# Patient Record
Sex: FEMALE | Race: WHITE | NOT HISPANIC OR LATINO | Employment: OTHER | URBAN - METROPOLITAN AREA
[De-identification: names, ages, dates, MRNs, and addresses within clinical notes are randomized per-mention and may not be internally consistent; named-entity substitution may affect disease eponyms.]

---

## 2018-05-16 ENCOUNTER — HOSPITAL ENCOUNTER (EMERGENCY)
Facility: OTHER | Age: 48
Discharge: HOME | End: 2018-05-16
Attending: EMERGENCY MEDICINE
Payer: MEDICAID

## 2018-05-16 VITALS
WEIGHT: 111 LBS | BODY MASS INDEX: 22.42 KG/M2 | DIASTOLIC BLOOD PRESSURE: 64 MMHG | RESPIRATION RATE: 17 BRPM | TEMPERATURE: 98 F | OXYGEN SATURATION: 100 % | HEART RATE: 98 BPM | SYSTOLIC BLOOD PRESSURE: 119 MMHG

## 2018-05-16 DIAGNOSIS — N30.90 CYSTITIS: Primary | ICD-10-CM

## 2018-05-16 LAB
ALBUMIN SERPL BCP-MCNC: 2.4 G/DL
ALP SERPL-CCNC: 97 U/L
ALT SERPL W/O P-5'-P-CCNC: 17 U/L
ANION GAP SERPL CALC-SCNC: 9 MMOL/L
AST SERPL-CCNC: 26 U/L
B-HCG UR QL: NEGATIVE
BACTERIA #/AREA URNS HPF: ABNORMAL /HPF
BASOPHILS # BLD AUTO: 0.05 K/UL
BASOPHILS NFR BLD: 0.3 %
BILIRUB SERPL-MCNC: 0.2 MG/DL
BILIRUB UR QL STRIP: NEGATIVE
BUN SERPL-MCNC: 22 MG/DL
CALCIUM SERPL-MCNC: 9.3 MG/DL
CHLORIDE SERPL-SCNC: 105 MMOL/L
CLARITY UR: ABNORMAL
CO2 SERPL-SCNC: 21 MMOL/L
COLOR UR: ABNORMAL
CREAT SERPL-MCNC: 1.8 MG/DL
CTP QC/QA: YES
DIFFERENTIAL METHOD: ABNORMAL
EOSINOPHIL # BLD AUTO: 0 K/UL
EOSINOPHIL NFR BLD: 0.2 %
ERYTHROCYTE [DISTWIDTH] IN BLOOD BY AUTOMATED COUNT: 16.8 %
EST. GFR  (AFRICAN AMERICAN): 38 ML/MIN/1.73 M^2
EST. GFR  (NON AFRICAN AMERICAN): 33 ML/MIN/1.73 M^2
GLUCOSE SERPL-MCNC: 147 MG/DL
GLUCOSE UR QL STRIP: NEGATIVE
HCT VFR BLD AUTO: 43.1 %
HGB BLD-MCNC: 13.9 G/DL
HGB UR QL STRIP: ABNORMAL
HYALINE CASTS #/AREA URNS LPF: 0 /LPF
KETONES UR QL STRIP: NEGATIVE
LEUKOCYTE ESTERASE UR QL STRIP: ABNORMAL
LIPASE SERPL-CCNC: 10 U/L
LYMPHOCYTES # BLD AUTO: 2.3 K/UL
LYMPHOCYTES NFR BLD: 13.9 %
MCH RBC QN AUTO: 28.6 PG
MCHC RBC AUTO-ENTMCNC: 32.3 G/DL
MCV RBC AUTO: 89 FL
MICROSCOPIC COMMENT: ABNORMAL
MONOCYTES # BLD AUTO: 0.6 K/UL
MONOCYTES NFR BLD: 3.6 %
NEUTROPHILS # BLD AUTO: 13.5 K/UL
NEUTROPHILS NFR BLD: 81.8 %
NITRITE UR QL STRIP: POSITIVE
PH UR STRIP: 6 [PH] (ref 5–8)
PLATELET # BLD AUTO: 602 K/UL
PMV BLD AUTO: 9.6 FL
POTASSIUM SERPL-SCNC: 4.8 MMOL/L
PROT SERPL-MCNC: 8.3 G/DL
PROT UR QL STRIP: ABNORMAL
RBC # BLD AUTO: 4.86 M/UL
RBC #/AREA URNS HPF: 8 /HPF (ref 0–4)
SODIUM SERPL-SCNC: 135 MMOL/L
SP GR UR STRIP: 1.02 (ref 1–1.03)
URN SPEC COLLECT METH UR: ABNORMAL
UROBILINOGEN UR STRIP-ACNC: NEGATIVE EU/DL
WBC # BLD AUTO: 16.47 K/UL
WBC #/AREA URNS HPF: >100 /HPF (ref 0–5)

## 2018-05-16 PROCEDURE — 85025 COMPLETE CBC W/AUTO DIFF WBC: CPT

## 2018-05-16 PROCEDURE — 87147 CULTURE TYPE IMMUNOLOGIC: CPT

## 2018-05-16 PROCEDURE — 63600175 PHARM REV CODE 636 W HCPCS: Performed by: EMERGENCY MEDICINE

## 2018-05-16 PROCEDURE — 96374 THER/PROPH/DIAG INJ IV PUSH: CPT

## 2018-05-16 PROCEDURE — 81025 URINE PREGNANCY TEST: CPT | Performed by: EMERGENCY MEDICINE

## 2018-05-16 PROCEDURE — 87086 URINE CULTURE/COLONY COUNT: CPT

## 2018-05-16 PROCEDURE — 99284 EMERGENCY DEPT VISIT MOD MDM: CPT | Mod: 25

## 2018-05-16 PROCEDURE — 80053 COMPREHEN METABOLIC PANEL: CPT

## 2018-05-16 PROCEDURE — 81000 URINALYSIS NONAUTO W/SCOPE: CPT

## 2018-05-16 PROCEDURE — 87088 URINE BACTERIA CULTURE: CPT

## 2018-05-16 PROCEDURE — 83690 ASSAY OF LIPASE: CPT

## 2018-05-16 RX ORDER — ATORVASTATIN CALCIUM 10 MG/1
10 TABLET, FILM COATED ORAL DAILY
COMMUNITY

## 2018-05-16 RX ORDER — CIPROFLOXACIN 500 MG/1
500 TABLET ORAL 2 TIMES DAILY
Qty: 14 TABLET | Refills: 0 | Status: SHIPPED | OUTPATIENT
Start: 2018-05-16 | End: 2018-05-26

## 2018-05-16 RX ORDER — CEFTRIAXONE 1 G/1
1 INJECTION, POWDER, FOR SOLUTION INTRAMUSCULAR; INTRAVENOUS
Status: COMPLETED | OUTPATIENT
Start: 2018-05-16 | End: 2018-05-16

## 2018-05-16 RX ORDER — METFORMIN HYDROCHLORIDE 500 MG/1
500 TABLET, FILM COATED, EXTENDED RELEASE ORAL
COMMUNITY

## 2018-05-16 RX ADMIN — CEFTRIAXONE SODIUM 1 G: 1 INJECTION, POWDER, FOR SOLUTION INTRAMUSCULAR; INTRAVENOUS at 04:05

## 2018-05-16 NOTE — DISCHARGE INSTRUCTIONS
Follow up with urology tomorrow as scheduled.    We have prescribed you antibiotics. Please fill and take as directed. It is important that you completely finish the antibiotics.        Please return to the ER if you have chest pain, difficulty breathing, fevers, altered mental status, dizziness, weakness, or any other concerns.      Follow up with your primary care physician.

## 2018-05-16 NOTE — ED TRIAGE NOTES
Pt with developmental delay and accompanied with caregiver. Pt has been having abd pain for less than 6 months and wt loss. Has seen a GI doctor who referred her to urology for a bladder obstruction. Appointment is tomorrow. Pt had two incontinent episodes and more complaints about lower abd pain so they brought her here. Staff reports she was coming down stairs and started shaking and appearing SOB. Pt points to lower abd/pelvic area as source of pain but unable to elaborate about specifics of pain.

## 2018-05-16 NOTE — ED PROVIDER NOTES
Encounter Date: 5/16/2018    SCRIBE #1 NOTE: I, Dr. Rudolph, am scribing for, and in the presence of, Frankie Buckley .       History     Chief Complaint   Patient presents with    Abdominal Pain     Per , pt urinated excessively in the bed two times this am. Pt also c/o abd pain.      Time seen by provider: 12:44 PM    This is a 47 y.o. Female, with history of osteopenia, who presents with complaint of intermittent, chronic abdominal pain for months that worsened this morning. Per caregiver, patient is a resident of a group home and urinated on herself twice this morning. Per caregiver, patient is not experiencing dysuria or vaginal pain with urinating. Caregiver states bladder incontinence is abnormal for the patient. Per caregiver, patient began experiencing weakness, tremors, and abdominal pain today. She was evaluated by GI in the past, had imaging completed, and was diagnosed with bladder obstruction. GI referred the patient to a urologist, and her appointment is tomorrow. Per caregiver, patient underwent an abdominal surgery in the past but is unsure of the specific procedure.  Per caregiver, pt currently at baseline at this time.          The history is provided by the patient and a caregiver. The history is limited by a developmental delay.     Review of patient's allergies indicates:   Allergen Reactions    Caffeine      Past Medical History:   Diagnosis Date    Osteopenia      Past Surgical History:   Procedure Laterality Date    HERNIA REPAIR       No family history on file.  Social History   Substance Use Topics    Smoking status: Never Smoker    Smokeless tobacco: Not on file    Alcohol use No     Review of Systems   Unable to perform ROS: Patient nonverbal       Physical Exam     Initial Vitals [05/16/18 1145]   BP Pulse Resp Temp SpO2   (!) 94/59 102 18 97.8 °F (36.6 °C) 99 %      MAP       70.67         Physical Exam    Nursing note and vitals reviewed.  Constitutional: She appears  well-developed and well-nourished. She is not diaphoretic. No distress.   HENT:   Head: Normocephalic and atraumatic.   Right Ear: External ear normal.   Left Ear: External ear normal.   Eyes: Conjunctivae and EOM are normal. Right eye exhibits no discharge. Left eye exhibits no discharge.   Neck: Normal range of motion. Neck supple. No tracheal deviation present.   Cardiovascular: Normal rate, regular rhythm, normal heart sounds and intact distal pulses.   Pulmonary/Chest: Breath sounds normal. No stridor. No respiratory distress. She has no wheezes. She has no rhonchi. She has no rales.   Abdominal: Soft. Bowel sounds are normal. She exhibits no distension. There is no tenderness. There is no rebound and no guarding.   Musculoskeletal: Normal range of motion. She exhibits no edema or tenderness.   Neurological: She is alert. She has normal strength.   Ambulatory with steady gait.     Skin: Skin is warm and dry. Capillary refill takes less than 2 seconds. No erythema. No pallor.   Psychiatric: She has a normal mood and affect. Her behavior is normal.         ED Course   Procedures  Labs Reviewed   URINALYSIS - Abnormal; Notable for the following:        Result Value    Appearance, UA Cloudy (*)     Protein, UA 3+ (*)     Occult Blood UA 3+ (*)     Nitrite, UA Positive (*)     Leukocytes, UA 2+ (*)     All other components within normal limits   COMPREHENSIVE METABOLIC PANEL - Abnormal; Notable for the following:     Sodium 135 (*)     CO2 21 (*)     Glucose 147 (*)     BUN, Bld 22 (*)     Creatinine 1.8 (*)     Albumin 2.4 (*)     eGFR if  38 (*)     eGFR if non  33 (*)     All other components within normal limits   CBC W/ AUTO DIFFERENTIAL - Abnormal; Notable for the following:     WBC 16.47 (*)     RDW 16.8 (*)     Platelets 602 (*)     Gran # (ANC) 13.5 (*)     Gran% 81.8 (*)     Lymph% 13.9 (*)     Mono% 3.6 (*)     All other components within normal limits   URINALYSIS  MICROSCOPIC - Abnormal; Notable for the following:     RBC, UA 8 (*)     WBC, UA >100 (*)     Bacteria, UA Many (*)     All other components within normal limits   CULTURE, URINE   CULTURE, URINE   LIPASE   POCT URINE PREGNANCY     Imaging Results          CT Abdomen Pelvis  Without Contrast (Final result)  Result time 05/16/18 14:59:48   Procedure changed from CT Abdomen Pelvis With Contrast     Final result by Brian Spann MD (05/16/18 14:59:48)                 Impression:      1. Marked urinary bladder wall thickening and urinary bladder distension.  This is in association with diffuse pericystic inflammation, and marked periureteral inflammation.  There is mild to moderate bilateral hydroureteronephrosis and urothelial thickening.  Findings are concerning for significant cystitis with likely ascending infection.  Clinical correlation and follow-up is recommended to exclude neoplastic process.  2. Postsurgical changes of the interpolar region of the left kidney, correlation with surgical history recommended.  3. Several additional findings above.      Electronically signed by: Brian Spann MD  Date:    05/16/2018  Time:    14:59             Narrative:    EXAMINATION:  CT ABDOMEN PELVIS WITHOUT CONTRAST    CLINICAL HISTORY:  Abdominal pain, unspecified;    TECHNIQUE:  Low dose axial images, sagittal and coronal reformations were obtained from the lung bases to the pubic symphysis.  Oral contrast was not administered.    COMPARISON:  None    FINDINGS:  Images of the lower thorax are remarkable for mild dependent atelectasis.    Please note, examination is limited secondary to patient motion.    Allowing for the above, the liver, pancreas, gallbladder and adrenal glands have a grossly unremarkable noncontrast appearance.  There is a lobular contour to the spleen, could reflect sequela of previous injury, or surgery versus variant.  There is no biliary dilation or ascites.  The pancreatic duct is not  dilated.  There is a minimal hiatal hernia.  Scattered shotty periaortic and paracaval lymph nodes are noted.    There are postsurgical changes with calcification involving the interpolar region of the left kidney.  There is bilateral moderate hydroureteronephrosis perinephric inflammation.  There is significant bilateral periureteral inflammation noting the ureters are prominent along their courses to the urinary bladder without definite calculi seen.  There is bilateral ureteral urothelial thickening.  There is diffuse circumferential thickening of the urinary bladder wall, measuring up to 1.4 cm.  The uterus and adnexa is otherwise grossly unremarkable noting dystrophic calcification within the left deep pelvis.  No focal organized pelvic fluid collection.    The large bowel is grossly unremarkable as is the appendix and terminal ileum.  The small bowel is grossly unremarkable.    Degenerative changes are noted of the lumbar spine without focal osseous destructive process.  There is superior endplate compression, that appears to be on the basis of a Schmorl's node, involving the superior endplate of L1.  There is vacuum disc phenomenon at this level.  No significant inguinal lymphadenopathy.  There is a fat containing umbilical hernia, as well as an anterior abdominal wall hernia just cranial to the umbilicus, containing fat.                                        Medical Decision Making:   History:   Old Medical Records: I decided to obtain old medical records.  Old Records Summarized: other records and records from clinic visits.  Initial Assessment:   12:44PM: Pt is a 46 y/o F who presents to ED with chronic abdominal pain, possibly worsening today, along with 2 episodes of urinary incontinence which is not her baseline. Pt otherwise acting at her baseline.  Will plan for labs, UA, imaging, will continue to follow and reassess.    Clinical Tests:   Lab Tests: Ordered and Reviewed  Radiological Study: Ordered  and Reviewed    3:39 PM:  Pt's US indicates UTI with +nitrite, 2+leuks. Urine culture pending.  CT shows bilateral hydronephrosis and findings consistent with cystitis as well.  Will plan for a dose of IV abx here along with PO course.  I updated pt's caregiver regarding results and plan, all questions answered.      4:17 PM:  Pt doing well, tolerated IV abx well.  Will plan to provide pt with copy of labs and CT results to bring to her urology appointment tomorrow.  I explained results to caregiver.  I have discussed with the pt's caregiver D return warnings and need for close PCP f/u.  Pt's caregiver agreeable to plan and all questions answered.  I feel that pt is stable for discharge and management as an outpatient and no further intervention is needed at this time.  Pt's caregiver is comfortable returning to the ED if needed.  Will DC home in stable condition.                  Scribe Attestation:   Scribe #1: I performed the above scribed service and the documentation accurately describes the services I performed. I attest to the accuracy of the note.    Attending Attestation:           Physician Attestation for Scribe:  Physician Attestation Statement for Scribe #1: I, Dr. Rudolph, reviewed documentation, as scribed by Frankie Buckley  in my presence, and it is both accurate and complete.                    Clinical Impression:     1. Cystitis                                 Bettie Rudolph MD  05/16/18 1021

## 2018-05-16 NOTE — ED NOTES
Caregiver states that she has been seen by GI for the same s/s and has an appt with urology tomorrow.

## 2018-05-18 LAB — BACTERIA UR CULT: NORMAL

## 2018-06-24 ENCOUNTER — HOSPITAL ENCOUNTER (EMERGENCY)
Facility: OTHER | Age: 48
Discharge: HOME | End: 2018-06-24
Attending: EMERGENCY MEDICINE
Payer: MEDICAID

## 2018-06-24 VITALS
SYSTOLIC BLOOD PRESSURE: 144 MMHG | TEMPERATURE: 98 F | RESPIRATION RATE: 18 BRPM | WEIGHT: 120 LBS | DIASTOLIC BLOOD PRESSURE: 67 MMHG | HEIGHT: 60 IN | BODY MASS INDEX: 23.56 KG/M2 | OXYGEN SATURATION: 100 % | HEART RATE: 84 BPM

## 2018-06-24 DIAGNOSIS — R31.9 HEMATURIA, UNSPECIFIED TYPE: Primary | ICD-10-CM

## 2018-06-24 DIAGNOSIS — N18.9 CHRONIC RENAL IMPAIRMENT, UNSPECIFIED CKD STAGE: ICD-10-CM

## 2018-06-24 LAB
ANION GAP SERPL CALC-SCNC: 11 MMOL/L
B-HCG UR QL: NEGATIVE
BACTERIA #/AREA URNS HPF: ABNORMAL /HPF
BASOPHILS # BLD AUTO: 0.04 K/UL
BASOPHILS NFR BLD: 0.3 %
BILIRUB UR QL STRIP: ABNORMAL
BUN SERPL-MCNC: 23 MG/DL
CALCIUM SERPL-MCNC: 9.6 MG/DL
CHLORIDE SERPL-SCNC: 104 MMOL/L
CLARITY UR: ABNORMAL
CO2 SERPL-SCNC: 19 MMOL/L
COLOR UR: ABNORMAL
CREAT SERPL-MCNC: 1.6 MG/DL
CTP QC/QA: YES
DIFFERENTIAL METHOD: ABNORMAL
EOSINOPHIL # BLD AUTO: 0 K/UL
EOSINOPHIL NFR BLD: 0.1 %
ERYTHROCYTE [DISTWIDTH] IN BLOOD BY AUTOMATED COUNT: 16.4 %
EST. GFR  (AFRICAN AMERICAN): 44 ML/MIN/1.73 M^2
EST. GFR  (NON AFRICAN AMERICAN): 38 ML/MIN/1.73 M^2
GLUCOSE SERPL-MCNC: 175 MG/DL
GLUCOSE UR QL STRIP: ABNORMAL
HCT VFR BLD AUTO: 44.7 %
HGB BLD-MCNC: 14.9 G/DL
HGB UR QL STRIP: ABNORMAL
HYALINE CASTS #/AREA URNS LPF: 0 /LPF
KETONES UR QL STRIP: ABNORMAL
LEUKOCYTE ESTERASE UR QL STRIP: ABNORMAL
LYMPHOCYTES # BLD AUTO: 1.8 K/UL
LYMPHOCYTES NFR BLD: 13.3 %
MCH RBC QN AUTO: 30.3 PG
MCHC RBC AUTO-ENTMCNC: 33.3 G/DL
MCV RBC AUTO: 91 FL
MICROSCOPIC COMMENT: ABNORMAL
MONOCYTES # BLD AUTO: 0.6 K/UL
MONOCYTES NFR BLD: 4.2 %
NEUTROPHILS # BLD AUTO: 11.2 K/UL
NEUTROPHILS NFR BLD: 81.8 %
NITRITE UR QL STRIP: POSITIVE
PH UR STRIP: 8 [PH] (ref 5–8)
PLATELET # BLD AUTO: 341 K/UL
PMV BLD AUTO: 9.5 FL
POTASSIUM SERPL-SCNC: 3.6 MMOL/L
PROT UR QL STRIP: ABNORMAL
RBC # BLD AUTO: 4.92 M/UL
RBC #/AREA URNS HPF: >100 /HPF (ref 0–4)
SODIUM SERPL-SCNC: 134 MMOL/L
SP GR UR STRIP: 1.02 (ref 1–1.03)
SQUAMOUS #/AREA URNS HPF: 3 /HPF
URN SPEC COLLECT METH UR: ABNORMAL
UROBILINOGEN UR STRIP-ACNC: ABNORMAL EU/DL
WBC # BLD AUTO: 13.7 K/UL
WBC #/AREA URNS HPF: 5 /HPF (ref 0–5)
WBC CLUMPS URNS QL MICRO: ABNORMAL

## 2018-06-24 PROCEDURE — 81025 URINE PREGNANCY TEST: CPT | Performed by: EMERGENCY MEDICINE

## 2018-06-24 PROCEDURE — 99283 EMERGENCY DEPT VISIT LOW MDM: CPT

## 2018-06-24 PROCEDURE — 25000003 PHARM REV CODE 250: Performed by: EMERGENCY MEDICINE

## 2018-06-24 PROCEDURE — 85025 COMPLETE CBC W/AUTO DIFF WBC: CPT

## 2018-06-24 PROCEDURE — 80048 BASIC METABOLIC PNL TOTAL CA: CPT

## 2018-06-24 PROCEDURE — 81000 URINALYSIS NONAUTO W/SCOPE: CPT

## 2018-06-24 RX ORDER — MULTIVITAMIN
1 TABLET ORAL DAILY
COMMUNITY

## 2018-06-24 RX ORDER — ASCORBIC ACID 500 MG
500 TABLET ORAL DAILY
COMMUNITY

## 2018-06-24 RX ORDER — GUAIFENESIN 100 MG/5ML
200 SOLUTION ORAL 3 TIMES DAILY PRN
COMMUNITY

## 2018-06-24 RX ORDER — CIPROFLOXACIN 500 MG/1
500 TABLET ORAL 2 TIMES DAILY
Qty: 14 TABLET | Refills: 0 | Status: SHIPPED | OUTPATIENT
Start: 2018-06-24 | End: 2018-07-01

## 2018-06-24 RX ORDER — AZELASTINE 1 MG/ML
1 SPRAY, METERED NASAL 2 TIMES DAILY
COMMUNITY

## 2018-06-24 RX ORDER — SIMETHICONE 80 MG
80 TABLET,CHEWABLE ORAL EVERY 6 HOURS PRN
COMMUNITY

## 2018-06-24 RX ADMIN — SODIUM CHLORIDE 1000 ML: 0.9 INJECTION, SOLUTION INTRAVENOUS at 12:06

## 2018-06-24 NOTE — ED PROVIDER NOTES
Encounter Date: 6/24/2018    SCRIBE #1 NOTE: Perla OLIVEIRA am scribing for, and in the presence of, Dr. Tubbs.       History     Chief Complaint   Patient presents with    Abdominal Pain     supra pelvic pain w/ hematuria x1 day     Time seen by provider: 11:23 AM    This is a 47 y.o. female who presents with complaint of abdominal pain that began yesterday. The pain is located in the suprapubic region. The patient's caretaker reports hematuria, but denies fever, vomiting or vaginal bleeding. The patient had breakfast this morning. The patient notes she fell this morning but denies any injuries as a result. The patient is compliant with all medications. Patient follows up regularly with PCP.        The history is provided by a caregiver and the patient.     Review of patient's allergies indicates:   Allergen Reactions    Caffeine      Past Medical History:   Diagnosis Date    Osteopenia      Past Surgical History:   Procedure Laterality Date    HERNIA REPAIR       No family history on file.  Social History   Substance Use Topics    Smoking status: Never Smoker    Smokeless tobacco: Not on file    Alcohol use No     Review of Systems   Constitutional: Negative for activity change, appetite change, chills, diaphoresis and fever.   HENT: Negative for congestion, sore throat and trouble swallowing.    Eyes: Negative for photophobia and visual disturbance.   Respiratory: Negative for cough, chest tightness and shortness of breath.    Cardiovascular: Negative for chest pain.   Gastrointestinal: Positive for abdominal pain. Negative for nausea and vomiting.   Endocrine: Negative for polydipsia and polyuria.   Genitourinary: Positive for hematuria. Negative for difficulty urinating, flank pain and vaginal bleeding.   Musculoskeletal: Negative for back pain and neck pain.   Skin: Negative for rash.   Neurological: Negative for weakness and headaches.   Psychiatric/Behavioral: Negative for confusion.       Physical  Exam     Initial Vitals [06/24/18 1059]   BP Pulse Resp Temp SpO2   (!) 135/91 101 18 98.3 °F (36.8 °C) 98 %      MAP       --         Physical Exam    Nursing note and vitals reviewed.  Constitutional: She is not diaphoretic. No distress.   Abnormal facies consistent with Down's Syndrome.   HENT:   Head: Normocephalic and atraumatic.   Right Ear: External ear normal.   Left Ear: External ear normal.   Mucous membranes are moist.   Eyes: EOM are normal. Pupils are equal, round, and reactive to light. Right eye exhibits no discharge. Left eye exhibits no discharge.   No pallor or icterus.   Neck: Normal range of motion.   Cardiovascular: Normal rate, regular rhythm and normal heart sounds. Exam reveals no gallop and no friction rub.    No murmur heard.  Pulmonary/Chest: Breath sounds normal. No respiratory distress. She has no wheezes. She has no rhonchi. She has no rales.   Abdominal: Soft.   Small surgical scar near midline. Suprapubic tenderness without rebound or guarding.   Musculoskeletal: Normal range of motion. She exhibits no edema or tenderness.   Neurological: She is alert and oriented to person, place, and time.   Skin: Skin is warm and dry. No rash and no abscess noted. No erythema. No pallor.   Psychiatric: She has a normal mood and affect. Her behavior is normal. Judgment and thought content normal.         ED Course   Procedures  Labs Reviewed   URINALYSIS - Abnormal; Notable for the following:        Result Value    Color, UA Red (*)     Appearance, UA Cloudy (*)     Protein, UA 3+ (*)     Glucose, UA Trace (*)     Ketones, UA Trace (*)     Bilirubin (UA) 1+ (*)     Occult Blood UA 3+ (*)     Nitrite, UA Positive (*)     Urobilinogen, UA 4.0-6.0 (*)     Leukocytes, UA 2+ (*)     All other components within normal limits   URINALYSIS MICROSCOPIC - Abnormal; Notable for the following:     RBC, UA >100 (*)     Bacteria, UA Few (*)     All other components within normal limits   CBC W/ AUTO DIFFERENTIAL  - Abnormal; Notable for the following:     WBC 13.70 (*)     RDW 16.4 (*)     Gran # (ANC) 11.2 (*)     Gran% 81.8 (*)     Lymph% 13.3 (*)     All other components within normal limits   BASIC METABOLIC PANEL - Abnormal; Notable for the following:     Sodium 134 (*)     CO2 19 (*)     Glucose 175 (*)     BUN, Bld 23 (*)     Creatinine 1.6 (*)     eGFR if  44 (*)     eGFR if non  38 (*)     All other components within normal limits   POCT URINE PREGNANCY          Imaging Results    None          Medical Decision Making:   Clinical Tests:   Lab Tests: Ordered and Reviewed            Scribe Attestation:   Scribe #1: I performed the above scribed service and the documentation accurately describes the services I performed. I attest to the accuracy of the note.    Attending Attestation:           Physician Attestation for Scribe:  Physician Attestation Statement for Scribe #1: I, Perla Alejandre, reviewed documentation, as scribed by Dr. Tubbs in my presence, and it is both accurate and complete.           Patient presents accompanied by caretaker after they noticed she has been urinating red/blood colored urine x2.  On chart review shows she was here prior for cystitis CT scan showed abnormalities on and she was directed to follow up.  Caretaker tried to find out who the urologist was on the currently unable to do so.  On today's laboratory studies show creatinine unchanged from prior she was given a L of fluid.  Urinalysis shows copious red blood cells.  Nitrite positive but this may be a false positive due to coloration of urine is unclear whether there is a concomitant UTI will therefore send a urine culture and treat with Cipro in the meantime.  In light of the hematuria, prior CT scan findings again stressed the need to follow up with Urology both to check for culture results, resolution of the hematuria, possible cystoscopy or other workup if it continues.  given alternate urologist  follow-up information if they are unable to determine who she saw previously.  Caretaker updated with findings, plan of care             Clinical Impression:     1. Hematuria, unspecified type    2. Chronic renal impairment, unspecified CKD stage                                 Jose Tubbs II, MD  06/24/18 0083

## 2018-06-24 NOTE — ED NOTES
Pt rounding complete. Pt denies any  needs at the moment. Pt does not appear to be in acute distress. Respirations are even and unlabored. VSS. Bed is locked and in lowest position with side rails up x2. Call light is within reach. Will continue to monitor. Caretaker is at the bedside.

## 2018-06-24 NOTE — ED NOTES
Pt presents with her caretaker with c/o lower abdominal pain. Pt has cognitive disability and when asked where is the pain she points to her lower abdomen right below the umbilicus. Belly is soft and non tender.  When asked if pt has any pain when urinating she says yes. Pt has bloody urine. Pt was tx for similar symptoms in may. Pt does not appear to be in acute distress. RR are even and unlabored. Pt is placed on cont bp and pulse ox monitoring. Bed is locked and in lowest position with side rails up x2, Call light is within reach. Caretaker is at the bedside.

## 2018-06-24 NOTE — ED NOTES
Pt rounding complete.Pt and caretaker updated on POC and verbalized understanding.  Pt denies any pain or needs at the moment. Pt does not appear to be in acute distress. Respirations are even and unlabored. VSS. Bed is locked and in lowest position with side rails up x2. Call light is within reach. Will continue to monitor.

## 2019-01-18 ENCOUNTER — HOSPITAL ENCOUNTER (EMERGENCY)
Facility: OTHER | Age: 49
Discharge: HOME | End: 2019-01-18
Attending: EMERGENCY MEDICINE
Payer: MEDICAID

## 2019-01-18 VITALS
HEART RATE: 60 BPM | BODY MASS INDEX: 21.48 KG/M2 | OXYGEN SATURATION: 95 % | DIASTOLIC BLOOD PRESSURE: 63 MMHG | WEIGHT: 110 LBS | RESPIRATION RATE: 18 BRPM | SYSTOLIC BLOOD PRESSURE: 105 MMHG | TEMPERATURE: 99 F

## 2019-01-18 DIAGNOSIS — N30.01 ACUTE CYSTITIS WITH HEMATURIA: Primary | ICD-10-CM

## 2019-01-18 DIAGNOSIS — I95.9 HYPOTENSION: ICD-10-CM

## 2019-01-18 LAB
ALBUMIN SERPL BCP-MCNC: 2.9 G/DL
ALP SERPL-CCNC: 114 U/L
ALT SERPL W/O P-5'-P-CCNC: 13 U/L
AMMONIA PLAS-SCNC: 23 UMOL/L
ANION GAP SERPL CALC-SCNC: 6 MMOL/L
AST SERPL-CCNC: 20 U/L
BACTERIA #/AREA URNS HPF: ABNORMAL /HPF
BASOPHILS # BLD AUTO: 0.05 K/UL
BASOPHILS NFR BLD: 0.6 %
BILIRUB SERPL-MCNC: 0.3 MG/DL
BILIRUB UR QL STRIP: NEGATIVE
BUN SERPL-MCNC: 18 MG/DL
CALCIUM SERPL-MCNC: 8.8 MG/DL
CHLORIDE SERPL-SCNC: 111 MMOL/L
CLARITY UR: CLEAR
CO2 SERPL-SCNC: 24 MMOL/L
COLOR UR: YELLOW
CREAT SERPL-MCNC: 1.6 MG/DL
DIFFERENTIAL METHOD: ABNORMAL
EOSINOPHIL # BLD AUTO: 0.1 K/UL
EOSINOPHIL NFR BLD: 0.8 %
ERYTHROCYTE [DISTWIDTH] IN BLOOD BY AUTOMATED COUNT: 13 %
EST. GFR  (AFRICAN AMERICAN): 44 ML/MIN/1.73 M^2
EST. GFR  (NON AFRICAN AMERICAN): 38 ML/MIN/1.73 M^2
GLUCOSE SERPL-MCNC: 114 MG/DL
GLUCOSE UR QL STRIP: NEGATIVE
HCT VFR BLD AUTO: 38.5 %
HGB BLD-MCNC: 12.6 G/DL
HGB UR QL STRIP: ABNORMAL
KETONES UR QL STRIP: NEGATIVE
LACTATE SERPL-SCNC: 0.9 MMOL/L
LEUKOCYTE ESTERASE UR QL STRIP: ABNORMAL
LIPASE SERPL-CCNC: 19 U/L
LYMPHOCYTES # BLD AUTO: 1.6 K/UL
LYMPHOCYTES NFR BLD: 17.7 %
MCH RBC QN AUTO: 31.7 PG
MCHC RBC AUTO-ENTMCNC: 32.7 G/DL
MCV RBC AUTO: 97 FL
MICROSCOPIC COMMENT: ABNORMAL
MONOCYTES # BLD AUTO: 0.4 K/UL
MONOCYTES NFR BLD: 4.6 %
NEUTROPHILS # BLD AUTO: 6.8 K/UL
NEUTROPHILS NFR BLD: 76 %
NITRITE UR QL STRIP: NEGATIVE
PH UR STRIP: 6 [PH] (ref 5–8)
PLATELET # BLD AUTO: 409 K/UL
PMV BLD AUTO: 9.3 FL
POCT GLUCOSE: 122 MG/DL (ref 70–110)
POTASSIUM SERPL-SCNC: 4.3 MMOL/L
PROT SERPL-MCNC: 7 G/DL
PROT UR QL STRIP: NEGATIVE
RBC # BLD AUTO: 3.97 M/UL
RBC #/AREA URNS HPF: 1 /HPF (ref 0–4)
SODIUM SERPL-SCNC: 141 MMOL/L
SP GR UR STRIP: 1.01 (ref 1–1.03)
TROPONIN I SERPL DL<=0.01 NG/ML-MCNC: 0.01 NG/ML
URN SPEC COLLECT METH UR: ABNORMAL
UROBILINOGEN UR STRIP-ACNC: NEGATIVE EU/DL
WBC # BLD AUTO: 8.99 K/UL
WBC #/AREA URNS HPF: 8 /HPF (ref 0–5)
WBC CLUMPS URNS QL MICRO: ABNORMAL

## 2019-01-18 PROCEDURE — P9612 CATHETERIZE FOR URINE SPEC: HCPCS

## 2019-01-18 PROCEDURE — 96361 HYDRATE IV INFUSION ADD-ON: CPT

## 2019-01-18 PROCEDURE — 63600175 PHARM REV CODE 636 W HCPCS: Performed by: EMERGENCY MEDICINE

## 2019-01-18 PROCEDURE — 80053 COMPREHEN METABOLIC PANEL: CPT

## 2019-01-18 PROCEDURE — 81000 URINALYSIS NONAUTO W/SCOPE: CPT

## 2019-01-18 PROCEDURE — 84484 ASSAY OF TROPONIN QUANT: CPT

## 2019-01-18 PROCEDURE — 82962 GLUCOSE BLOOD TEST: CPT

## 2019-01-18 PROCEDURE — 93005 ELECTROCARDIOGRAM TRACING: CPT

## 2019-01-18 PROCEDURE — 87040 BLOOD CULTURE FOR BACTERIA: CPT | Mod: 59

## 2019-01-18 PROCEDURE — 96366 THER/PROPH/DIAG IV INF ADDON: CPT

## 2019-01-18 PROCEDURE — 83605 ASSAY OF LACTIC ACID: CPT

## 2019-01-18 PROCEDURE — 93010 EKG 12-LEAD: ICD-10-PCS | Mod: ,,, | Performed by: INTERNAL MEDICINE

## 2019-01-18 PROCEDURE — 82140 ASSAY OF AMMONIA: CPT

## 2019-01-18 PROCEDURE — 96365 THER/PROPH/DIAG IV INF INIT: CPT

## 2019-01-18 PROCEDURE — 83690 ASSAY OF LIPASE: CPT

## 2019-01-18 PROCEDURE — 25000003 PHARM REV CODE 250: Performed by: EMERGENCY MEDICINE

## 2019-01-18 PROCEDURE — 85025 COMPLETE CBC W/AUTO DIFF WBC: CPT

## 2019-01-18 PROCEDURE — 99285 EMERGENCY DEPT VISIT HI MDM: CPT | Mod: 25

## 2019-01-18 PROCEDURE — 93010 ELECTROCARDIOGRAM REPORT: CPT | Mod: ,,, | Performed by: INTERNAL MEDICINE

## 2019-01-18 RX ORDER — VANCOMYCIN HCL IN 5 % DEXTROSE 1G/250ML
1000 PLASTIC BAG, INJECTION (ML) INTRAVENOUS
Status: DISCONTINUED | OUTPATIENT
Start: 2019-01-18 | End: 2019-01-18

## 2019-01-18 RX ORDER — SODIUM CHLORIDE 9 MG/ML
1000 INJECTION, SOLUTION INTRAVENOUS
Status: COMPLETED | OUTPATIENT
Start: 2019-01-18 | End: 2019-01-18

## 2019-01-18 RX ORDER — CIPROFLOXACIN 2 MG/ML
400 INJECTION, SOLUTION INTRAVENOUS
Status: COMPLETED | OUTPATIENT
Start: 2019-01-18 | End: 2019-01-18

## 2019-01-18 RX ORDER — SULFAMETHOXAZOLE AND TRIMETHOPRIM 800; 160 MG/1; MG/1
1 TABLET ORAL 2 TIMES DAILY
Qty: 14 TABLET | Refills: 0 | Status: SHIPPED | OUTPATIENT
Start: 2019-01-18 | End: 2019-01-25

## 2019-01-18 RX ADMIN — CIPROFLOXACIN 400 MG: 2 INJECTION, SOLUTION INTRAVENOUS at 02:01

## 2019-01-18 RX ADMIN — SODIUM CHLORIDE 1000 ML: 0.9 INJECTION, SOLUTION INTRAVENOUS at 12:01

## 2019-01-18 RX ADMIN — SODIUM CHLORIDE 1000 ML: 0.9 INJECTION, SOLUTION INTRAVENOUS at 02:01

## 2019-01-18 NOTE — ED PROVIDER NOTES
Encounter Date: 1/18/2019       History     Chief Complaint   Patient presents with    Altered Mental Status     generalized weakness with abd pain.      Time seen by provider: 11:58 PM    This is a 48 y.o. female who presents with complaint of generalized malaise that began prior to arrival. The patient's caregiver reports that she has noticed the patient has been experiencing increased fatigue since she woke up this morning and abdominal pain. She has noticed that while the at the patient's day program, she was increasingly more fatigued and lethargic. She normally drinks diet coke and did not ask for one today which caretaker states is unlike her.  The patient has no other complaints.       The history is provided by a caregiver.     Review of patient's allergies indicates:   Allergen Reactions    Caffeine      Past Medical History:   Diagnosis Date    Osteopenia      Past Surgical History:   Procedure Laterality Date    HERNIA REPAIR       History reviewed. No pertinent family history.  Social History     Tobacco Use    Smoking status: Never Smoker   Substance Use Topics    Alcohol use: No    Drug use: No     Review of Systems   Constitutional: Positive for fatigue. Negative for fever.   HENT: Negative for sore throat.    Respiratory: Negative for shortness of breath.    Cardiovascular: Negative for chest pain.   Gastrointestinal: Positive for abdominal pain. Negative for nausea.   Genitourinary: Negative for dysuria.   Musculoskeletal: Negative for back pain.   Skin: Negative for rash.   Neurological: Negative for weakness.   Hematological: Does not bruise/bleed easily.   All other systems reviewed and are negative.      Physical Exam     Initial Vitals   BP Pulse Resp Temp SpO2   01/18/19 1138 01/18/19 1138 01/18/19 1138 01/18/19 1318 01/18/19 1138   (!) 83/43 (!) 55 18 98.9 °F (37.2 °C) 100 %      MAP       --                Physical Exam    Nursing note and vitals reviewed.  Constitutional: She  appears well-developed and well-nourished. She is not diaphoretic. No distress.   Patient is able to nod yes and no to questions. Looks unwell in appearance.    HENT:   Head: Normocephalic and atraumatic.   Mouth/Throat: Oropharynx is clear and moist.   Down facies  Mucous membranes are tacky.    Eyes: Conjunctivae and EOM are normal. Pupils are equal, round, and reactive to light.   Neck: Normal range of motion. No JVD present.   Cardiovascular: Normal rate, regular rhythm and normal heart sounds. Exam reveals no gallop and no friction rub.    No murmur heard.  Pulmonary/Chest: Breath sounds normal. No respiratory distress. She has no wheezes. She has no rhonchi. She has no rales.   Abdominal: Soft. There is no tenderness. There is no rebound and no guarding.   No CVA tenderness   Musculoskeletal: Normal range of motion. She exhibits no edema or tenderness.   Neurological: She is alert and oriented to person, place, and time. She has normal strength. No cranial nerve deficit. GCS score is 15. GCS eye subscore is 4. GCS verbal subscore is 5. GCS motor subscore is 6.   Normal gait   Skin: Skin is warm and dry. Capillary refill takes less than 2 seconds. No rash and no abscess noted. No erythema. No pallor.   Psychiatric: She has a normal mood and affect. Her behavior is normal. Judgment and thought content normal.         ED Course   Critical Care  Date/Time: 1/18/2019 9:25 PM  Performed by: Miya Hurtado MD  Authorized by: Miya Hurtado MD   Direct patient critical care time: 45 minutes  Total critical care time (exclusive of procedural time) : 45 minutes        Labs Reviewed   CBC W/ AUTO DIFFERENTIAL - Abnormal; Notable for the following components:       Result Value    RBC 3.97 (*)     MCH 31.7 (*)     Platelets 409 (*)     Gran% 76.0 (*)     Lymph% 17.7 (*)     All other components within normal limits   COMPREHENSIVE METABOLIC PANEL - Abnormal; Notable for the following components:    Chloride 111 (*)      Glucose 114 (*)     Creatinine 1.6 (*)     Albumin 2.9 (*)     Anion Gap 6 (*)     eGFR if  44 (*)     eGFR if non  38 (*)     All other components within normal limits   URINALYSIS, REFLEX TO URINE CULTURE - Abnormal; Notable for the following components:    Occult Blood UA Trace (*)     Leukocytes, UA 3+ (*)     All other components within normal limits    Narrative:     Preferred Collection Type->Urine, Clean Catch   URINALYSIS MICROSCOPIC - Abnormal; Notable for the following components:    WBC, UA 8 (*)     WBC Clumps, UA Occasional (*)     All other components within normal limits    Narrative:     Preferred Collection Type->Urine, Clean Catch   POCT GLUCOSE - Abnormal; Notable for the following components:    POCT Glucose 122 (*)     All other components within normal limits   CULTURE, BLOOD   CULTURE, BLOOD   AMMONIA   LACTIC ACID, PLASMA   LIPASE   TROPONIN I     EKG Readings: (Independently Interpreted)   Initial Reading: No STEMI.   Junctional rhythm at a rate of 49 bpm. Normal axis. .       Imaging Results          CT Head Without Contrast (Final result)  Result time 01/18/19 13:00:05    Final result by Lopez Pak MD (01/18/19 13:00:05)                 Impression:      No acute large vascular territory infarct or intracranial hemorrhage identified.    Periventricular white matter mild hypoattenuation with differential considerations above.      Electronically signed by: Lopez Pak MD  Date:    01/18/2019  Time:    13:00             Narrative:    EXAMINATION:  CT HEAD WITHOUT CONTRAST    CLINICAL HISTORY:  altered mental status;    TECHNIQUE:  Low dose axial CT images obtained throughout the head without intravenous contrast. Sagittal and coronal reconstructions were performed.    COMPARISON:  None.    FINDINGS:  Intracranial compartment:    Left ventricle is asymmetrically larger than the right, probably normal variant.  The ventricles are otherwise  midline without distortion by mass effect or evidence of acute hydrocephalus.  No extra-axial blood or fluid collections.    Mild periventricular white matter hypoattenuation, nonspecific but can be seen with sequela of demyelinating disease, vasculitis, chronic migraines or chronic small vessel ischemic change, among others.  No parenchymal mass, hemorrhage, edema or major vascular distribution infarct.  Prominent bilateral basal ganglia calcifications noted.  Calcific atherosclerosis of the bilateral cavernous ICAs.    Skull/extracranial contents (limited evaluation): No fracture. Mastoid air cells and paranasal sinuses are essentially clear.  Visualized portions of the orbits are within normal limits.                               X-Ray Chest AP Portable (Final result)  Result time 01/18/19 12:39:08    Final result by Cory Scales MD (01/18/19 12:39:08)                 Impression:      1. Right basilar/retrocardiac airspace opacity is felt to reflect overlapping vascular structures related to patient dextro rotation. acute cardiopulmonary process appreciated.      Electronically signed by: Cory Scales  Date:    01/18/2019  Time:    12:39             Narrative:    EXAMINATION:  XR CHEST AP PORTABLE    CLINICAL HISTORY:  Hypotension, unspecified    TECHNIQUE:  Single frontal portable view of the chest was performed.    Patient position in dextro rotation, slightly limiting evaluation.    COMPARISON:  None    FINDINGS:  Cardiomediastinal silhouette is within normal limits.    Right basilar/retrocardiac airspace opacity is felt to reflect overlapping vascular structures related to patient dextro rotation.  Otherwise, no focal consolidation, overt interstitial edema, sizable pleural effusion or pneumothorax.    Multilevel degenerative changes of the imaged spine.                              X-Rays:   Independently Interpreted Readings:   Chest X-Ray: Possible right lower lobe opacity.      Medical Decision  Making:   Differential Diagnosis:   Sepsis, UTI, electrolyte abnormality, metabolic derangement   Clinical Tests:   Lab Tests: Ordered and Reviewed  Radiological Study: Ordered and Reviewed  Medical Tests: Ordered and Reviewed  ED Management:  This is an emergent evaluation of a 48y WF hx Down Syndrome presenting with lethargy.  Initially concerned for sepsis as patient felt warm to touch, was diaphoretic and hypotensive. Culture sent, she was resuscitated with 30 cc/kg bolus and broad spectrum abx ordered.  She has remained afebrile. Lactic acid normal. Labs returned without electrolyte abnormalities or leukocytosis.  UA + infection.  CXR with questionable PNA however radiology read is without acute cardiopulmonary disease.  After IVF bolus patient remained normotensive.  She states she feels better compared to this morning.  Given IV cipro, other abx de-escalated.    3:24 PM: Patient able to walk to without assistance to restroom. Care taker reports that she is back to baseline. Discharged to caretaker with bactrim.  No indications for admission or further inpatient workup.            Scribe Attestation:   Scribe #1: I performed the above scribed service and the documentation accurately describes the services I performed. I attest to the accuracy of the note.    Attending Attestation:           Physician Attestation for Scribe:  Physician Attestation Statement for Scribe #1: I, Dr. Hurtado, reviewed documentation, as scribed by Gianluca Del Valle in my presence, and it is both accurate and complete.                    Clinical Impression:     1. Acute cystitis with hematuria    2. Hypotension                                   Miya Hurtado MD  01/19/19 1021

## 2019-01-18 NOTE — ED NOTES
LOC: The patient is awake, alert and aware of environment with an appropriate affect, the patient is oriented to self and responds to commands  APPEARANCE: Patient resting comfortably and in no acute distress, patient is clean and well groomed, patient's clothing is properly fastened.  SKIN: The skin is warm and dry, patient has normal skin turgor and moist mucus membranes, skin intact, no breakdown or brusing noted.  MUSKULOSKELETAL: Patient moving all extremities well, no obvious swelling or deformities noted.  RESPIRATORY: Airway is open and patent, respirations are spontaneous, patient has a normal effort and rate. Breath sounds are clear and equal bilaterally.  CARDIAC: Normal heart sounds. No peripheral edema.  ABDOMEN: Soft and non tender to palpation, no distention noted. Bowel sounds present.

## 2019-01-18 NOTE — ED NOTES
Pt tolerated Straight cath well w/ x 2 RNs at bedside; CAMMY Roque and KIMMY Greco RN. Pt cleaned, perineal care completed, clean brief applied to pt and linens changed. [place] applied for comfort. Personal Caregiver remains at bedside w/ all belongings.

## 2019-01-23 LAB — BACTERIA BLD CULT: NORMAL

## 2019-01-24 LAB — BACTERIA BLD CULT: NORMAL

## 2020-07-29 ENCOUNTER — LAB VISIT (OUTPATIENT)
Dept: LAB | Facility: OTHER | Age: 50
End: 2020-07-29
Payer: MEDICAID

## 2020-07-29 DIAGNOSIS — Z20.822 SUSPECTED COVID-19 VIRUS INFECTION: ICD-10-CM

## 2020-07-29 DIAGNOSIS — Z03.818 ENCOUNTER FOR OBSERVATION FOR SUSPECTED EXPOSURE TO OTHER BIOLOGICAL AGENTS RULED OUT: ICD-10-CM

## 2020-07-29 PROCEDURE — U0003 INFECTIOUS AGENT DETECTION BY NUCLEIC ACID (DNA OR RNA); SEVERE ACUTE RESPIRATORY SYNDROME CORONAVIRUS 2 (SARS-COV-2) (CORONAVIRUS DISEASE [COVID-19]), AMPLIFIED PROBE TECHNIQUE, MAKING USE OF HIGH THROUGHPUT TECHNOLOGIES AS DESCRIBED BY CMS-2020-01-R: HCPCS

## 2020-08-03 LAB — SARS-COV-2 RNA RESP QL NAA+PROBE: NEGATIVE

## 2020-11-11 ENCOUNTER — LAB VISIT (OUTPATIENT)
Dept: LAB | Facility: OTHER | Age: 50
End: 2020-11-11
Payer: MEDICAID

## 2020-11-11 DIAGNOSIS — Z03.818 ENCOUNTER FOR OBSERVATION FOR SUSPECTED EXPOSURE TO OTHER BIOLOGICAL AGENTS RULED OUT: ICD-10-CM

## 2020-11-11 PROCEDURE — U0003 INFECTIOUS AGENT DETECTION BY NUCLEIC ACID (DNA OR RNA); SEVERE ACUTE RESPIRATORY SYNDROME CORONAVIRUS 2 (SARS-COV-2) (CORONAVIRUS DISEASE [COVID-19]), AMPLIFIED PROBE TECHNIQUE, MAKING USE OF HIGH THROUGHPUT TECHNOLOGIES AS DESCRIBED BY CMS-2020-01-R: HCPCS

## 2020-11-12 LAB — SARS-COV-2 RNA RESP QL NAA+PROBE: NOT DETECTED

## 2020-12-18 ENCOUNTER — LAB VISIT (OUTPATIENT)
Dept: LAB | Facility: OTHER | Age: 50
End: 2020-12-18
Payer: MEDICAID

## 2020-12-18 DIAGNOSIS — Z03.818 ENCOUNTER FOR OBSERVATION FOR SUSPECTED EXPOSURE TO OTHER BIOLOGICAL AGENTS RULED OUT: ICD-10-CM

## 2020-12-18 PROCEDURE — U0003 INFECTIOUS AGENT DETECTION BY NUCLEIC ACID (DNA OR RNA); SEVERE ACUTE RESPIRATORY SYNDROME CORONAVIRUS 2 (SARS-COV-2) (CORONAVIRUS DISEASE [COVID-19]), AMPLIFIED PROBE TECHNIQUE, MAKING USE OF HIGH THROUGHPUT TECHNOLOGIES AS DESCRIBED BY CMS-2020-01-R: HCPCS

## 2020-12-20 LAB — SARS-COV-2 RNA RESP QL NAA+PROBE: NOT DETECTED

## 2021-01-08 ENCOUNTER — LAB VISIT (OUTPATIENT)
Dept: LAB | Facility: OTHER | Age: 51
End: 2021-01-08
Payer: MEDICAID

## 2021-01-08 DIAGNOSIS — Z03.818 ENCOUNTER FOR OBSERVATION FOR SUSPECTED EXPOSURE TO OTHER BIOLOGICAL AGENTS RULED OUT: ICD-10-CM

## 2021-01-08 PROCEDURE — U0003 INFECTIOUS AGENT DETECTION BY NUCLEIC ACID (DNA OR RNA); SEVERE ACUTE RESPIRATORY SYNDROME CORONAVIRUS 2 (SARS-COV-2) (CORONAVIRUS DISEASE [COVID-19]), AMPLIFIED PROBE TECHNIQUE, MAKING USE OF HIGH THROUGHPUT TECHNOLOGIES AS DESCRIBED BY CMS-2020-01-R: HCPCS

## 2021-01-11 LAB — SARS-COV-2 RNA RESP QL NAA+PROBE: NOT DETECTED

## 2021-03-05 ENCOUNTER — CLINICAL SUPPORT (OUTPATIENT)
Dept: URGENT CARE | Facility: CLINIC | Age: 51
End: 2021-03-05
Payer: MEDICAID

## 2021-03-05 DIAGNOSIS — Z78.9 NO KNOWN HEALTH PROBLEMS: Primary | ICD-10-CM

## 2021-03-05 LAB
CTP QC/QA: YES
SARS-COV-2 RDRP RESP QL NAA+PROBE: NEGATIVE

## 2021-03-05 PROCEDURE — U0002 COVID-19 LAB TEST NON-CDC: HCPCS | Mod: QW,S$GLB,, | Performed by: PHYSICIAN ASSISTANT

## 2021-03-05 PROCEDURE — U0002: ICD-10-PCS | Mod: QW,S$GLB,, | Performed by: PHYSICIAN ASSISTANT

## 2021-03-05 PROCEDURE — 99211 PR OFFICE/OUTPT VISIT, EST, LEVL I: ICD-10-PCS | Mod: S$GLB,,, | Performed by: PHYSICIAN ASSISTANT

## 2021-03-05 PROCEDURE — 99211 OFF/OP EST MAY X REQ PHY/QHP: CPT | Mod: S$GLB,,, | Performed by: PHYSICIAN ASSISTANT

## 2021-04-26 ENCOUNTER — PATIENT MESSAGE (OUTPATIENT)
Dept: RESEARCH | Facility: HOSPITAL | Age: 51
End: 2021-04-26

## 2021-05-20 ENCOUNTER — HOSPITAL ENCOUNTER (EMERGENCY)
Facility: OTHER | Age: 51
Discharge: HOME | End: 2021-05-20
Attending: EMERGENCY MEDICINE
Payer: MEDICAID

## 2021-05-20 VITALS
WEIGHT: 110 LBS | HEART RATE: 64 BPM | SYSTOLIC BLOOD PRESSURE: 121 MMHG | OXYGEN SATURATION: 98 % | RESPIRATION RATE: 20 BRPM | HEIGHT: 60 IN | TEMPERATURE: 98 F | DIASTOLIC BLOOD PRESSURE: 63 MMHG | BODY MASS INDEX: 21.6 KG/M2

## 2021-05-20 DIAGNOSIS — T83.510A URINARY TRACT INFECTION ASSOCIATED WITH CYSTOSTOMY CATHETER, INITIAL ENCOUNTER: ICD-10-CM

## 2021-05-20 DIAGNOSIS — T83.090A OBSTRUCTION OF SUPRAPUBIC CATHETER, INITIAL ENCOUNTER: Primary | ICD-10-CM

## 2021-05-20 DIAGNOSIS — N39.0 URINARY TRACT INFECTION ASSOCIATED WITH CYSTOSTOMY CATHETER, INITIAL ENCOUNTER: ICD-10-CM

## 2021-05-20 LAB
ALBUMIN SERPL BCP-MCNC: 2.8 G/DL (ref 3.5–5.2)
ALP SERPL-CCNC: 102 U/L (ref 55–135)
ALT SERPL W/O P-5'-P-CCNC: 14 U/L (ref 10–44)
ANION GAP SERPL CALC-SCNC: 11 MMOL/L (ref 8–16)
AST SERPL-CCNC: 20 U/L (ref 10–40)
BACTERIA #/AREA URNS HPF: ABNORMAL /HPF
BASOPHILS # BLD AUTO: 0.12 K/UL (ref 0–0.2)
BASOPHILS NFR BLD: 0.8 % (ref 0–1.9)
BILIRUB SERPL-MCNC: 0.2 MG/DL (ref 0.1–1)
BILIRUB UR QL STRIP: NEGATIVE
BUN SERPL-MCNC: 30 MG/DL (ref 6–20)
CALCIUM SERPL-MCNC: 8.7 MG/DL (ref 8.7–10.5)
CHLORIDE SERPL-SCNC: 108 MMOL/L (ref 95–110)
CLARITY UR: ABNORMAL
CO2 SERPL-SCNC: 21 MMOL/L (ref 23–29)
COLOR UR: YELLOW
CREAT SERPL-MCNC: 2.1 MG/DL (ref 0.5–1.4)
DIFFERENTIAL METHOD: ABNORMAL
EOSINOPHIL # BLD AUTO: 0.2 K/UL (ref 0–0.5)
EOSINOPHIL NFR BLD: 1.5 % (ref 0–8)
ERYTHROCYTE [DISTWIDTH] IN BLOOD BY AUTOMATED COUNT: 12.9 % (ref 11.5–14.5)
EST. GFR  (AFRICAN AMERICAN): 31 ML/MIN/1.73 M^2
EST. GFR  (NON AFRICAN AMERICAN): 27 ML/MIN/1.73 M^2
GLUCOSE SERPL-MCNC: 135 MG/DL (ref 70–110)
GLUCOSE UR QL STRIP: NEGATIVE
HCT VFR BLD AUTO: 41.6 % (ref 37–48.5)
HCV AB SERPL QL IA: NEGATIVE
HGB BLD-MCNC: 13.5 G/DL (ref 12–16)
HGB UR QL STRIP: ABNORMAL
HIV 1+2 AB+HIV1 P24 AG SERPL QL IA: NEGATIVE
HYALINE CASTS #/AREA URNS LPF: 0 /LPF
IMM GRANULOCYTES # BLD AUTO: 0.08 K/UL (ref 0–0.04)
IMM GRANULOCYTES NFR BLD AUTO: 0.5 % (ref 0–0.5)
KETONES UR QL STRIP: NEGATIVE
LEUKOCYTE ESTERASE UR QL STRIP: ABNORMAL
LYMPHOCYTES # BLD AUTO: 2.7 K/UL (ref 1–4.8)
LYMPHOCYTES NFR BLD: 17.6 % (ref 18–48)
MCH RBC QN AUTO: 32.1 PG (ref 27–31)
MCHC RBC AUTO-ENTMCNC: 32.5 G/DL (ref 32–36)
MCV RBC AUTO: 99 FL (ref 82–98)
MICROSCOPIC COMMENT: ABNORMAL
MONOCYTES # BLD AUTO: 1 K/UL (ref 0.3–1)
MONOCYTES NFR BLD: 6.5 % (ref 4–15)
NEUTROPHILS # BLD AUTO: 11 K/UL (ref 1.8–7.7)
NEUTROPHILS NFR BLD: 73.1 % (ref 38–73)
NITRITE UR QL STRIP: POSITIVE
NRBC BLD-RTO: 0 /100 WBC
PH UR STRIP: 7 [PH] (ref 5–8)
PLATELET # BLD AUTO: 240 K/UL (ref 150–450)
PMV BLD AUTO: 9.9 FL (ref 9.2–12.9)
POTASSIUM SERPL-SCNC: 4.1 MMOL/L (ref 3.5–5.1)
PROT SERPL-MCNC: 7 G/DL (ref 6–8.4)
PROT UR QL STRIP: ABNORMAL
RBC # BLD AUTO: 4.2 M/UL (ref 4–5.4)
RBC #/AREA URNS HPF: 12 /HPF (ref 0–4)
SODIUM SERPL-SCNC: 140 MMOL/L (ref 136–145)
SP GR UR STRIP: 1.02 (ref 1–1.03)
SQUAMOUS #/AREA URNS HPF: 0 /HPF
URN SPEC COLLECT METH UR: ABNORMAL
UROBILINOGEN UR STRIP-ACNC: NEGATIVE EU/DL
WBC # BLD AUTO: 15.1 K/UL (ref 3.9–12.7)
WBC #/AREA URNS HPF: >100 /HPF (ref 0–5)
WBC CLUMPS URNS QL MICRO: ABNORMAL

## 2021-05-20 PROCEDURE — 87088 URINE BACTERIA CULTURE: CPT | Performed by: EMERGENCY MEDICINE

## 2021-05-20 PROCEDURE — 81000 URINALYSIS NONAUTO W/SCOPE: CPT | Performed by: EMERGENCY MEDICINE

## 2021-05-20 PROCEDURE — 87077 CULTURE AEROBIC IDENTIFY: CPT | Performed by: EMERGENCY MEDICINE

## 2021-05-20 PROCEDURE — 80053 COMPREHEN METABOLIC PANEL: CPT | Performed by: EMERGENCY MEDICINE

## 2021-05-20 PROCEDURE — 96360 HYDRATION IV INFUSION INIT: CPT | Mod: 59

## 2021-05-20 PROCEDURE — 51702 INSERT TEMP BLADDER CATH: CPT

## 2021-05-20 PROCEDURE — 87186 SC STD MICRODIL/AGAR DIL: CPT | Performed by: EMERGENCY MEDICINE

## 2021-05-20 PROCEDURE — 87086 URINE CULTURE/COLONY COUNT: CPT | Performed by: EMERGENCY MEDICINE

## 2021-05-20 PROCEDURE — 99284 EMERGENCY DEPT VISIT MOD MDM: CPT | Mod: 25

## 2021-05-20 PROCEDURE — 25000003 PHARM REV CODE 250: Performed by: EMERGENCY MEDICINE

## 2021-05-20 PROCEDURE — 85025 COMPLETE CBC W/AUTO DIFF WBC: CPT | Performed by: EMERGENCY MEDICINE

## 2021-05-20 PROCEDURE — 86703 HIV-1/HIV-2 1 RESULT ANTBDY: CPT | Performed by: EMERGENCY MEDICINE

## 2021-05-20 PROCEDURE — 86803 HEPATITIS C AB TEST: CPT | Performed by: EMERGENCY MEDICINE

## 2021-05-20 RX ORDER — CEPHALEXIN 250 MG/1
250 CAPSULE ORAL
Status: COMPLETED | OUTPATIENT
Start: 2021-05-20 | End: 2021-05-20

## 2021-05-20 RX ORDER — CEPHALEXIN 250 MG/1
250 CAPSULE ORAL EVERY 12 HOURS
Qty: 10 CAPSULE | Refills: 0 | Status: SHIPPED | OUTPATIENT
Start: 2021-05-20 | End: 2021-05-25

## 2021-05-20 RX ADMIN — CEPHALEXIN 250 MG: 250 CAPSULE ORAL at 03:05

## 2021-05-23 LAB — BACTERIA UR CULT: ABNORMAL

## 2021-08-07 ENCOUNTER — HOSPITAL ENCOUNTER (EMERGENCY)
Facility: OTHER | Age: 51
Discharge: HOME | End: 2021-08-07
Attending: EMERGENCY MEDICINE
Payer: MEDICAID

## 2021-08-07 VITALS
BODY MASS INDEX: 20.99 KG/M2 | WEIGHT: 100 LBS | HEIGHT: 58 IN | SYSTOLIC BLOOD PRESSURE: 108 MMHG | HEART RATE: 65 BPM | RESPIRATION RATE: 16 BRPM | TEMPERATURE: 99 F | OXYGEN SATURATION: 99 % | DIASTOLIC BLOOD PRESSURE: 53 MMHG

## 2021-08-07 DIAGNOSIS — T83.510A URINARY TRACT INFECTION ASSOCIATED WITH CYSTOSTOMY CATHETER, INITIAL ENCOUNTER: ICD-10-CM

## 2021-08-07 DIAGNOSIS — N30.01 ACUTE CYSTITIS WITH HEMATURIA: Primary | ICD-10-CM

## 2021-08-07 DIAGNOSIS — N39.0 URINARY TRACT INFECTION ASSOCIATED WITH CYSTOSTOMY CATHETER, INITIAL ENCOUNTER: ICD-10-CM

## 2021-08-07 LAB
ANION GAP SERPL CALC-SCNC: 10 MMOL/L (ref 8–16)
BACTERIA #/AREA URNS HPF: ABNORMAL /HPF
BILIRUB UR QL STRIP: NEGATIVE
BUN SERPL-MCNC: 30 MG/DL (ref 6–20)
CALCIUM SERPL-MCNC: 8.7 MG/DL (ref 8.7–10.5)
CHLORIDE SERPL-SCNC: 112 MMOL/L (ref 95–110)
CLARITY UR: CLEAR
CO2 SERPL-SCNC: 21 MMOL/L (ref 23–29)
COLOR UR: YELLOW
CREAT SERPL-MCNC: 2.2 MG/DL (ref 0.5–1.4)
EST. GFR  (AFRICAN AMERICAN): 29 ML/MIN/1.73 M^2
EST. GFR  (NON AFRICAN AMERICAN): 25 ML/MIN/1.73 M^2
GLUCOSE SERPL-MCNC: 95 MG/DL (ref 70–110)
GLUCOSE UR QL STRIP: NEGATIVE
HGB UR QL STRIP: ABNORMAL
HYALINE CASTS #/AREA URNS LPF: 0 /LPF
KETONES UR QL STRIP: NEGATIVE
LEUKOCYTE ESTERASE UR QL STRIP: ABNORMAL
MICROSCOPIC COMMENT: ABNORMAL
NITRITE UR QL STRIP: POSITIVE
PH UR STRIP: 8 [PH] (ref 5–8)
POTASSIUM SERPL-SCNC: 3.4 MMOL/L (ref 3.5–5.1)
PROT UR QL STRIP: ABNORMAL
RBC #/AREA URNS HPF: 15 /HPF (ref 0–4)
SODIUM SERPL-SCNC: 143 MMOL/L (ref 136–145)
SP GR UR STRIP: 1.01 (ref 1–1.03)
URN SPEC COLLECT METH UR: ABNORMAL
UROBILINOGEN UR STRIP-ACNC: NEGATIVE EU/DL
WBC #/AREA URNS HPF: 50 /HPF (ref 0–5)

## 2021-08-07 PROCEDURE — 25000003 PHARM REV CODE 250: Performed by: EMERGENCY MEDICINE

## 2021-08-07 PROCEDURE — 87186 SC STD MICRODIL/AGAR DIL: CPT | Mod: 59 | Performed by: EMERGENCY MEDICINE

## 2021-08-07 PROCEDURE — 81000 URINALYSIS NONAUTO W/SCOPE: CPT | Performed by: EMERGENCY MEDICINE

## 2021-08-07 PROCEDURE — 87077 CULTURE AEROBIC IDENTIFY: CPT | Mod: 59 | Performed by: EMERGENCY MEDICINE

## 2021-08-07 PROCEDURE — 87086 URINE CULTURE/COLONY COUNT: CPT | Performed by: EMERGENCY MEDICINE

## 2021-08-07 PROCEDURE — 99283 EMERGENCY DEPT VISIT LOW MDM: CPT | Mod: 25

## 2021-08-07 PROCEDURE — 87088 URINE BACTERIA CULTURE: CPT | Performed by: EMERGENCY MEDICINE

## 2021-08-07 PROCEDURE — 80048 BASIC METABOLIC PNL TOTAL CA: CPT | Performed by: EMERGENCY MEDICINE

## 2021-08-07 RX ORDER — LEVOTHYROXINE SODIUM 50 UG/1
50 TABLET ORAL
COMMUNITY

## 2021-08-07 RX ORDER — CEPHALEXIN 250 MG/1
250 CAPSULE ORAL
Status: COMPLETED | OUTPATIENT
Start: 2021-08-07 | End: 2021-08-07

## 2021-08-07 RX ORDER — CEPHALEXIN 250 MG/1
250 CAPSULE ORAL EVERY 12 HOURS
Qty: 14 CAPSULE | Refills: 0 | Status: SHIPPED | OUTPATIENT
Start: 2021-08-07 | End: 2021-08-14

## 2021-08-07 RX ORDER — OXYBUTYNIN CHLORIDE 10 MG/1
10 TABLET, EXTENDED RELEASE ORAL DAILY
COMMUNITY

## 2021-08-07 RX ORDER — DICYCLOMINE HYDROCHLORIDE 10 MG/1
10 CAPSULE ORAL
Status: ON HOLD | COMMUNITY
End: 2022-04-28 | Stop reason: SDUPTHER

## 2021-08-07 RX ADMIN — CEPHALEXIN 250 MG: 250 CAPSULE ORAL at 11:08

## 2021-08-08 ENCOUNTER — CLINICAL SUPPORT (OUTPATIENT)
Dept: URGENT CARE | Facility: CLINIC | Age: 51
End: 2021-08-08
Payer: MEDICAID

## 2021-08-08 DIAGNOSIS — Z11.52 ENCOUNTER FOR SCREENING LABORATORY TESTING FOR COVID-19 VIRUS: Primary | ICD-10-CM

## 2021-08-08 LAB
CTP QC/QA: YES
SARS-COV-2 RDRP RESP QL NAA+PROBE: NEGATIVE

## 2021-08-08 PROCEDURE — U0002 COVID-19 LAB TEST NON-CDC: HCPCS | Mod: QW,S$GLB,, | Performed by: FAMILY MEDICINE

## 2021-08-08 PROCEDURE — U0002: ICD-10-PCS | Mod: QW,S$GLB,, | Performed by: FAMILY MEDICINE

## 2021-08-10 LAB
BACTERIA UR CULT: ABNORMAL
BACTERIA UR CULT: ABNORMAL

## 2021-09-24 ENCOUNTER — HOSPITAL ENCOUNTER (EMERGENCY)
Facility: OTHER | Age: 51
Discharge: HOME | End: 2021-09-24
Attending: EMERGENCY MEDICINE
Payer: MEDICAID

## 2021-09-24 VITALS
SYSTOLIC BLOOD PRESSURE: 124 MMHG | BODY MASS INDEX: 20.99 KG/M2 | WEIGHT: 100 LBS | HEART RATE: 80 BPM | TEMPERATURE: 98 F | DIASTOLIC BLOOD PRESSURE: 74 MMHG | RESPIRATION RATE: 20 BRPM | HEIGHT: 58 IN | OXYGEN SATURATION: 98 %

## 2021-09-24 DIAGNOSIS — Z93.59 SUPRAPUBIC CATHETER: ICD-10-CM

## 2021-09-24 DIAGNOSIS — R33.9 URINARY RETENTION: Primary | ICD-10-CM

## 2021-09-24 PROCEDURE — 99283 EMERGENCY DEPT VISIT LOW MDM: CPT | Mod: 25

## 2021-09-24 PROCEDURE — 51705 CHANGE OF BLADDER TUBE: CPT

## 2021-10-03 ENCOUNTER — HOSPITAL ENCOUNTER (EMERGENCY)
Facility: OTHER | Age: 51
Discharge: HOME | End: 2021-10-03
Attending: EMERGENCY MEDICINE
Payer: MEDICAID

## 2021-10-03 VITALS
OXYGEN SATURATION: 100 % | BODY MASS INDEX: 20.9 KG/M2 | HEIGHT: 58 IN | TEMPERATURE: 98 F | HEART RATE: 68 BPM | SYSTOLIC BLOOD PRESSURE: 108 MMHG | RESPIRATION RATE: 18 BRPM | DIASTOLIC BLOOD PRESSURE: 56 MMHG

## 2021-10-03 DIAGNOSIS — T83.010A SUPRAPUBIC CATHETER DYSFUNCTION, INITIAL ENCOUNTER: Primary | ICD-10-CM

## 2021-10-03 DIAGNOSIS — R33.8 ACUTE URINARY RETENTION: ICD-10-CM

## 2021-10-03 PROCEDURE — 99283 EMERGENCY DEPT VISIT LOW MDM: CPT | Mod: 25

## 2021-10-03 PROCEDURE — 51705 CHANGE OF BLADDER TUBE: CPT

## 2021-10-19 ENCOUNTER — HOSPITAL ENCOUNTER (EMERGENCY)
Facility: OTHER | Age: 51
Discharge: HOME | End: 2021-10-19
Attending: EMERGENCY MEDICINE
Payer: MEDICAID

## 2021-10-19 VITALS
RESPIRATION RATE: 20 BRPM | TEMPERATURE: 98 F | WEIGHT: 91.06 LBS | OXYGEN SATURATION: 100 % | DIASTOLIC BLOOD PRESSURE: 90 MMHG | BODY MASS INDEX: 21.07 KG/M2 | HEART RATE: 65 BPM | HEIGHT: 55 IN | SYSTOLIC BLOOD PRESSURE: 118 MMHG

## 2021-10-19 DIAGNOSIS — T83.510A URINARY TRACT INFECTION ASSOCIATED WITH CYSTOSTOMY CATHETER, INITIAL ENCOUNTER: ICD-10-CM

## 2021-10-19 DIAGNOSIS — N39.0 URINARY TRACT INFECTION ASSOCIATED WITH CYSTOSTOMY CATHETER, INITIAL ENCOUNTER: ICD-10-CM

## 2021-10-19 DIAGNOSIS — T83.010A SUPRAPUBIC CATHETER DYSFUNCTION, INITIAL ENCOUNTER: Primary | ICD-10-CM

## 2021-10-19 LAB
BACTERIA #/AREA URNS HPF: ABNORMAL /HPF
BILIRUB UR QL STRIP: NEGATIVE
CLARITY UR: ABNORMAL
COLOR UR: YELLOW
GLUCOSE UR QL STRIP: NEGATIVE
HGB UR QL STRIP: ABNORMAL
KETONES UR QL STRIP: NEGATIVE
LEUKOCYTE ESTERASE UR QL STRIP: ABNORMAL
MICROSCOPIC COMMENT: ABNORMAL
NITRITE UR QL STRIP: POSITIVE
PH UR STRIP: 8 [PH] (ref 5–8)
PROT UR QL STRIP: ABNORMAL
RBC #/AREA URNS HPF: 12 /HPF (ref 0–4)
SP GR UR STRIP: 1.02 (ref 1–1.03)
SQUAMOUS #/AREA URNS HPF: 0 /HPF
URN SPEC COLLECT METH UR: ABNORMAL
UROBILINOGEN UR STRIP-ACNC: NEGATIVE EU/DL
WBC #/AREA URNS HPF: >100 /HPF (ref 0–5)
WBC CLUMPS URNS QL MICRO: ABNORMAL

## 2021-10-19 PROCEDURE — 99284 EMERGENCY DEPT VISIT MOD MDM: CPT | Mod: 25

## 2021-10-19 PROCEDURE — 81000 URINALYSIS NONAUTO W/SCOPE: CPT | Performed by: PHYSICIAN ASSISTANT

## 2021-10-19 PROCEDURE — 25000003 PHARM REV CODE 250: Performed by: PHYSICIAN ASSISTANT

## 2021-10-19 RX ORDER — SULFAMETHOXAZOLE AND TRIMETHOPRIM 800; 160 MG/1; MG/1
1 TABLET ORAL 2 TIMES DAILY
Qty: 14 TABLET | Refills: 0 | Status: SHIPPED | OUTPATIENT
Start: 2021-10-19 | End: 2021-10-26

## 2021-10-19 RX ADMIN — LIDOCAINE-EPINEPHRINE-TETRACAINE GEL 4-0.05-0.5% 1 ML: 4-0.05-0.5 GEL at 04:10

## 2021-11-10 ENCOUNTER — HOSPITAL ENCOUNTER (EMERGENCY)
Facility: OTHER | Age: 51
Discharge: HOME | End: 2021-11-10
Attending: EMERGENCY MEDICINE
Payer: MEDICAID

## 2021-11-10 VITALS
RESPIRATION RATE: 20 BRPM | OXYGEN SATURATION: 100 % | TEMPERATURE: 98 F | HEART RATE: 77 BPM | SYSTOLIC BLOOD PRESSURE: 131 MMHG | DIASTOLIC BLOOD PRESSURE: 62 MMHG

## 2021-11-10 DIAGNOSIS — T83.010A SUPRAPUBIC CATHETER DYSFUNCTION, INITIAL ENCOUNTER: Primary | ICD-10-CM

## 2021-11-10 PROCEDURE — 99283 EMERGENCY DEPT VISIT LOW MDM: CPT | Mod: 25

## 2021-11-10 PROCEDURE — 51705 CHANGE OF BLADDER TUBE: CPT

## 2022-01-20 ENCOUNTER — HOSPITAL ENCOUNTER (EMERGENCY)
Facility: OTHER | Age: 52
Discharge: HOME | End: 2022-01-20
Attending: EMERGENCY MEDICINE
Payer: MEDICAID

## 2022-01-20 VITALS
OXYGEN SATURATION: 100 % | SYSTOLIC BLOOD PRESSURE: 130 MMHG | HEART RATE: 88 BPM | TEMPERATURE: 99 F | DIASTOLIC BLOOD PRESSURE: 80 MMHG | RESPIRATION RATE: 18 BRPM

## 2022-01-20 DIAGNOSIS — T83.010A SUPRAPUBIC CATHETER DYSFUNCTION, INITIAL ENCOUNTER: Primary | ICD-10-CM

## 2022-01-20 PROCEDURE — 51705 CHANGE OF BLADDER TUBE: CPT

## 2022-01-20 PROCEDURE — 25000003 PHARM REV CODE 250: Performed by: EMERGENCY MEDICINE

## 2022-01-20 PROCEDURE — 99283 EMERGENCY DEPT VISIT LOW MDM: CPT | Mod: 25

## 2022-01-20 RX ADMIN — LIDOCAINE-EPINEPHRINE-TETRACAINE GEL 4-0.05-0.5%: 4-0.05-0.5 GEL at 01:01

## 2022-01-20 NOTE — ED PROVIDER NOTES
"Encounter Date: 1/20/2022    SCRIBE #1 NOTE: I, Kris Esparza, am scribing for, and in the presence of,  Darryl Gutierrez MD. I have scribed the following portions of the note - Other sections scribed: HPI, ROS, PE.       History     Chief Complaint   Patient presents with    catheter out      Pts home health aid brought her in bc he noticed her catheter was pulled out.      Time seen by provider: 12:30 PM    This is a 51 y.o. female with Down's syndrome who presents with complaint of lower abdominal pains in the setting of dysfunction of her indwelling suprapubic catheter today. Patient's caregiver states that this was not witnessed and he daughter this morning with urine all over her underwear and none in the catheter leg bag. He denies observing any bleeding from the site. The patient states that something feels "stuck." No other associated symtpoms.    The history is provided by the patient.     Review of patient's allergies indicates:   Allergen Reactions    Caffeine      Past Medical History:   Diagnosis Date    Down syndrome     Osteopenia      Past Surgical History:   Procedure Laterality Date    HERNIA REPAIR       History reviewed. No pertinent family history.  Social History     Tobacco Use    Smoking status: Never Smoker    Smokeless tobacco: Never Used   Substance Use Topics    Alcohol use: No    Drug use: No     Review of Systems   Constitutional: Negative for fever.   HENT: Negative for sore throat.    Respiratory: Negative for shortness of breath.    Cardiovascular: Negative for chest pain.   Gastrointestinal: Positive for abdominal pain. Negative for nausea.   Genitourinary: Negative for dysuria.   Musculoskeletal: Negative for back pain.   Skin: Negative for rash.   Neurological: Negative for weakness.   Hematological: Does not bruise/bleed easily.       Physical Exam     Initial Vitals [01/20/22 1046]   BP Pulse Resp Temp SpO2   122/88 80 18 98.3 °F (36.8 °C) 100 %      MAP       --    "      Physical Exam    Nursing note and vitals reviewed.  Constitutional: She is not diaphoretic. No distress.   HENT:   Head: Normocephalic and atraumatic.   Eyes: Conjunctivae and EOM are normal. No scleral icterus.   Neck: Neck supple.   Normal range of motion.  Cardiovascular: Normal rate, regular rhythm, normal heart sounds and intact distal pulses.   No murmur heard.  Pulmonary/Chest: Breath sounds normal. No accessory muscle usage. No tachypnea and no bradypnea. No respiratory distress. She has no wheezes. She has no rhonchi. She has no rales.   Abdominal: Abdomen is soft. She exhibits no distension. There is no abdominal tenderness.   Indwelling suprapubic catheter in place in right lower quadrant with no bleeding or drainage. There is no rebound and no guarding.   Musculoskeletal:         General: No edema. Normal range of motion.      Cervical back: Normal range of motion and neck supple.     Neurological: She is alert.   Skin: Skin is warm and dry.         ED Course   Suprapubic Tube    Date/Time: 1/20/2022 2:07 PM  Performed by: Darryl Gutierrez MD  Authorized by: Darryl Gutierrez MD   Consent: Verbal consent obtained.  Risks and benefits: risks, benefits and alternatives were discussed  Consent given by: guardian  Indications: catheter change  Local anesthesia used: yes  Anesthesia: see MAR for details    Anesthesia:  Local anesthesia used: yes  Local Anesthetic: LET (lido, epi, tetracaine)    Sedation:  Patient sedated: no    Preparation: Patient was prepped and draped in the usual sterile fashion.  Suprapubic aspiration by: catheter  Catheter type: Ayers  Catheter size: 14 Fr  Number of attempts: 1  Urine characteristics: clear  Patient tolerance: patient tolerated the procedure well with no immediate complications        Labs Reviewed - No data to display       Imaging Results    None          Medications   LETS (LIDOcaine-TETRAcaine-EPINEPHrine) gel solution ( Topical (Top) Given 1/20/22  1300)     Medical Decision Making:   History:   Old Medical Records: I decided to obtain old medical records.  Initial Assessment:       51-year-old female history of Down's syndrome, indwelling suprapubic catheter brought by caretaker due to concern for catheter displacement.  Patient woke up in caretaker noticed urine soaked underwear, and no drainage in the leg bag. Per chart review, she has suprapubic catheter placed a year ago due to history of urinary retention and hydronephrosis with CKD, and has had multiple ER visits since then for catheter displacement and malfunction requiring replacement.    On exam patient with catheter in place with no surrounding sign of trauma, no bleeding.  Bedside ultrasound done that shows catheter in bladder with balloon inflated, but bladder also full.  Nursing attempted to flush the catheter with no improvement or drainage into leg bag.  Catheter removed with urine exiting via ostomy site, and new 14 Malagasy coude (same sized) catheter placed with urine draining through it, and bedside ultrasound visualized placement into bladder.  No fevers or other symptoms to suggest UTI, unclear etiology for catheter occlusion but could be related to accidental trauma.  Patient discharged with new catheter in place and leg bag, caretaker comfortable with discharge plan.      Clinical Tests:   Radiological Study: Ordered and Reviewed          Scribe Attestation:   Scribe #1: I performed the above scribed service and the documentation accurately describes the services I performed. I attest to the accuracy of the note.               I, Dr. Darryl Gutierrez, personally performed the services described in this documentation. All medical record entries made by the scribe were at my direction and in my presence.  I have reviewed the chart and agree that the record reflects my personal performance and is accurate and complete. Darryl Gutierrez MD.  12:38 AM 01/21/2022        Clinical Impression:    Final diagnoses:  [T83.010A] Suprapubic catheter dysfunction, initial encounter (Primary)          ED Disposition Condition    Discharge Stable        ED Prescriptions     None        Follow-up Information     Follow up With Specialties Details Why Contact Info    [facility] - Emergency Dept Emergency Medicine Go to  As needed [address]  [facility] [address]  [phone]           Darryl Gutierrez MD  01/21/22 0038

## 2022-01-20 NOTE — ED TRIAGE NOTES
Pt pulled suprapubic catheter out at some point over night. Pt denies any pain or needs at this time. Pt is ambulatory, respirations even unlabored. Will continue to monitor.

## 2022-02-09 ENCOUNTER — HOSPITAL ENCOUNTER (EMERGENCY)
Facility: OTHER | Age: 52
Discharge: HOME | End: 2022-02-09
Attending: EMERGENCY MEDICINE
Payer: MEDICAID

## 2022-02-09 VITALS
WEIGHT: 97 LBS | HEIGHT: 60 IN | HEART RATE: 80 BPM | RESPIRATION RATE: 16 BRPM | OXYGEN SATURATION: 99 % | BODY MASS INDEX: 19.04 KG/M2 | SYSTOLIC BLOOD PRESSURE: 135 MMHG | DIASTOLIC BLOOD PRESSURE: 62 MMHG | TEMPERATURE: 98 F

## 2022-02-09 DIAGNOSIS — T83.010A SUPRAPUBIC CATHETER DYSFUNCTION, INITIAL ENCOUNTER: Primary | ICD-10-CM

## 2022-02-09 PROCEDURE — 25000003 PHARM REV CODE 250: Performed by: EMERGENCY MEDICINE

## 2022-02-09 PROCEDURE — 99283 EMERGENCY DEPT VISIT LOW MDM: CPT | Mod: 25

## 2022-02-09 PROCEDURE — 51705 CHANGE OF BLADDER TUBE: CPT

## 2022-02-09 RX ORDER — LIDOCAINE HYDROCHLORIDE 20 MG/ML
JELLY TOPICAL
Status: COMPLETED | OUTPATIENT
Start: 2022-02-09 | End: 2022-02-09

## 2022-02-09 RX ORDER — LIDOCAINE HYDROCHLORIDE 20 MG/ML
5 SOLUTION OROPHARYNGEAL
Status: DISCONTINUED | OUTPATIENT
Start: 2022-02-09 | End: 2022-02-09

## 2022-02-09 RX ADMIN — LIDOCAINE HYDROCHLORIDE 10 ML: 20 JELLY TOPICAL at 09:02

## 2022-02-09 NOTE — ED NOTES
"Patient arrives to ER with caretaker after she removed her suprapubic catheter this morning. Patient was found chewing on catheter this morning at 0600, caretaker does not know how long tube was out. Patient states that she feels a bit of "tummy pain."   "

## 2022-02-09 NOTE — ED NOTES
Assumed cared of pt.  Bed placed in lowest position, side rails up x 2. Call light is within reach of pt and caretaker, instructed on how to use call light . Explanation of care provided. Plan of Care; Observe and reassure, explain procedures to pt and caretaker, continue to observe and monitor and keep patient and caretaker informed.Position of comfort for patient to be maintained.  Pt is minimally verbal and autistic, caretaker remains at bs. Per caretaker, pt pulled suprapubic cath out at unknown time this morning. Upon arrival to ED there was no active bleeding from site, pt grew agitated during procedure of re-installing cath, which per caretaker is pt's normal state when cath care/reinstallment gets done. This writer was unable to place suprapubic cath in ED. Pt had 15 Vietnamese PTA and department only stocks 16/18french, pending smaller sizes from central supply. MD aware    Psych: No acute distress is noted. Pt is agitated and has flat affect.     LOC: The patient is awake, alert and aware of environment with an appropriate affect, the patient is oriented x 1 and speaking at her baseline.     APPEARANCE: Patient resting and in no acute distress, patient is clean and well groomed, patient's clothing is properly fastened.    SKIN: The skin is warm and dry, patient has normal skin turgor and moist mucus membranes,no rashes or lesions.Skin Intact , No Breakdown noted.    MUSCULOSKELETAL:  Normal range of motion noted. Moves all extremeties well, No swelling, deformity or tenderness noted.    RESPIRATORY: Airway is open and patent, respirations are spontaneous, patient has a normal effort and rate.Bilateral Lungs Sounds are clear. Pink nailbeds.     CARDIAC: Patient has a normal rate and rhythm, no periphreal edema noted, capillary refill < 3 seconds. Denies chest pain. Skin warm and dry.     ABDOMEN: Soft and non tender to palpation, no distention noted. Bowels Sounds are active x4  PULSES: 2+  And symmetrical in  all extremeties    NEUROLOGIC:  OSBALDO, Follows commands without difficulty. Speech is clear. No neuro deficits observed.

## 2022-02-09 NOTE — ED PROVIDER NOTES
"  Source of History:  Medical record, caretaker    Chief complaint:  Per triage note: "urinary catheter problem (Pt self removed her urinary catheter this morning around 0500hrs - group home staff is with the pt and has the old catheter with pt)  "    HPI:    Shannan Medrano is a 51 y.o. female who presents to the ED with caretaker with complaint of urinary catheter problems. The caretaker reports that he arrived this morning to the group home and noticed that the patient apparently self removed her catheter. It is expected that she removed the suprapubic catheter between 3 am-6 am today.     This is the extent of the patient's complaints at this time.     ROS:   As per HPI and below:   General: No fever.   HENT: No facial pain.   Eyes: No eye pain.   Cardiovascular: No chest pain.   Respiratory:  No dyspnea.   GI: No abdominal pain. No nausea. No vomiting. No diarrhea.   Skin: No rashes.   Neuro:  No syncope.  No focal deficits.   Musculoskeletal: No extremity pain.  All other systems reviewed and are negative.      Review of patient's allergies indicates:   Allergen Reactions    Caffeine        PMH:  As per HPI and below:  Past Medical History:   Diagnosis Date    Down syndrome     Osteopenia        Past Surgical History:   Procedure Laterality Date    HERNIA REPAIR         Social History     Tobacco Use    Smoking status: Never Smoker    Smokeless tobacco: Never Used   Substance Use Topics    Alcohol use: No    Drug use: No       Physical Exam:      Nursing note and vitals reviewed.  /63 (BP Location: Right arm, Patient Position: Sitting)   Pulse 81   Temp 97.7 °F (36.5 °C) (Oral)   Resp 16   Ht 5' (1.524 m)   Wt 44 kg (97 lb)   SpO2 98%   BMI 18.94 kg/m²     Constitutional: Caretaker at bedside. AAOx3. Well appearing.   Eyes: EOMI. No discharge. Anicteric.  HENT:   Mouth/Throat:    Neck: Normal range of motion. Neck supple.    Cardiovascular: Normal rate  Pulmonary/Chest: No respiratory " distress.   Abdominal: No tenderness. No distension   : Cystoscopy site clean, dry, and intact.  Musculoskeletal: Normal range of motion.   Neurological: GCS15. Alert and oriented to person, place, and time. No gross cranial nerve II-XII, focal strength, or light touch deficit. Steady gait.   Skin: Skin is warm and dry.   Psychiatric: Behavior is normal. Judgment normal.        MDM:    I decided to obtain the patient's medical records.    ED Course as of 02/09/22 1359   Wed Feb 09, 2022   5929 Patient is a 51-year-old female with Down syndrome, with suprapubic catheter in place who presents after she apparently removed her suprapubic catheter just prior to arrival.  On exam, patient well-appearing, with cystostomy site clean/dry/intact with no evidence of trauma.  [RC]   1015 Urinary Catheter   Performed by: Timothy Rdz MD  Authorized by: Timothy Rdz MD   Indications: catheter change  Catheter insertion: suprapubic.  Catheter type: Ayers  Catheter size: 10 Fr  Complicated insertion: no  Number of attempts:  2 failed attempts with 14 Nigerian.  Successful with 10 Nigerian  Urine characteristics: clear  Complications: No  Specimens: No  Implants: No  Patient tolerance: Patient tolerated the procedure well with no immediate complications [RC]   1016 Ayers catheter was replaced in sterile fashion without incident or complication as documented.   No evidence of acute condition.  Patient discharged back to her facility with instructions to follow-up with urology for reassessment, interpretation for any new, concerning, or worsening symptoms. [RC]      ED Course User Index  [RC] Timothy Rdz MD       Medications - No data to display           Demetria OLIVEIRA scrguillaume for, and in the presence of, Dr. Rdz. I performed the scribed service and the documentation accurately describes the services I performed. I attest to the accuracy of the note.     Physician Attestation for Scribe:   Timothy OLIVEIRA MD, reviewed  documentation as scribed in my presence, which is both accurate and complete.    Diagnostic Impression:    1. Suprapubic catheter dysfunction, initial encounter                  Timothy Rdz MD  02/09/22 1400

## 2022-02-15 ENCOUNTER — HOSPITAL ENCOUNTER (EMERGENCY)
Facility: OTHER | Age: 52
Discharge: HOME | End: 2022-02-15
Attending: EMERGENCY MEDICINE
Payer: MEDICAID

## 2022-02-15 VITALS
OXYGEN SATURATION: 100 % | HEART RATE: 62 BPM | SYSTOLIC BLOOD PRESSURE: 129 MMHG | BODY MASS INDEX: 19.04 KG/M2 | DIASTOLIC BLOOD PRESSURE: 63 MMHG | RESPIRATION RATE: 16 BRPM | WEIGHT: 97 LBS | TEMPERATURE: 99 F | HEIGHT: 60 IN

## 2022-02-15 DIAGNOSIS — R80.9 PROTEINURIA, UNSPECIFIED TYPE: ICD-10-CM

## 2022-02-15 DIAGNOSIS — T83.510A URINARY TRACT INFECTION ASSOCIATED WITH CYSTOSTOMY CATHETER, INITIAL ENCOUNTER: Primary | ICD-10-CM

## 2022-02-15 DIAGNOSIS — T83.010A SUPRAPUBIC CATHETER DYSFUNCTION, INITIAL ENCOUNTER: ICD-10-CM

## 2022-02-15 DIAGNOSIS — N39.0 URINARY TRACT INFECTION ASSOCIATED WITH CYSTOSTOMY CATHETER, INITIAL ENCOUNTER: Primary | ICD-10-CM

## 2022-02-15 DIAGNOSIS — R31.9 HEMATURIA, UNSPECIFIED TYPE: ICD-10-CM

## 2022-02-15 LAB
BACTERIA #/AREA URNS HPF: ABNORMAL /HPF
BILIRUB UR QL STRIP: NEGATIVE
CLARITY UR: ABNORMAL
COLOR UR: YELLOW
GLUCOSE UR QL STRIP: NEGATIVE
HGB UR QL STRIP: ABNORMAL
KETONES UR QL STRIP: NEGATIVE
LEUKOCYTE ESTERASE UR QL STRIP: ABNORMAL
MICROSCOPIC COMMENT: ABNORMAL
NITRITE UR QL STRIP: POSITIVE
PH UR STRIP: 7 [PH] (ref 5–8)
PROT UR QL STRIP: ABNORMAL
RBC #/AREA URNS HPF: 3 /HPF (ref 0–4)
SP GR UR STRIP: 1.01 (ref 1–1.03)
SQUAMOUS #/AREA URNS HPF: 1 /HPF
URN SPEC COLLECT METH UR: ABNORMAL
UROBILINOGEN UR STRIP-ACNC: NEGATIVE EU/DL
WBC #/AREA URNS HPF: 50 /HPF (ref 0–5)
WBC CLUMPS URNS QL MICRO: ABNORMAL

## 2022-02-15 PROCEDURE — 51705 CHANGE OF BLADDER TUBE: CPT

## 2022-02-15 PROCEDURE — 51798 US URINE CAPACITY MEASURE: CPT

## 2022-02-15 PROCEDURE — 81000 URINALYSIS NONAUTO W/SCOPE: CPT | Performed by: PHYSICIAN ASSISTANT

## 2022-02-15 PROCEDURE — 87077 CULTURE AEROBIC IDENTIFY: CPT | Performed by: PHYSICIAN ASSISTANT

## 2022-02-15 PROCEDURE — 87086 URINE CULTURE/COLONY COUNT: CPT | Performed by: PHYSICIAN ASSISTANT

## 2022-02-15 PROCEDURE — 99283 EMERGENCY DEPT VISIT LOW MDM: CPT | Mod: 25

## 2022-02-15 PROCEDURE — 87186 SC STD MICRODIL/AGAR DIL: CPT | Performed by: PHYSICIAN ASSISTANT

## 2022-02-15 PROCEDURE — 87088 URINE BACTERIA CULTURE: CPT | Performed by: PHYSICIAN ASSISTANT

## 2022-02-15 RX ORDER — SULFAMETHOXAZOLE AND TRIMETHOPRIM 800; 160 MG/1; MG/1
1 TABLET ORAL 2 TIMES DAILY
Qty: 14 TABLET | Refills: 0 | Status: SHIPPED | OUTPATIENT
Start: 2022-02-15 | End: 2022-02-22

## 2022-02-15 NOTE — ED PROVIDER NOTES
Encounter Date: 2/15/2022    SCRIBE #1 NOTE: I, Nancie Abrams, am scribing for, and in the presence of, Dr. Guerra.       History     Chief Complaint   Patient presents with    suprapubic catheter out     Pt is at group home.  Care giver states she thinks suprapubic catheter is out Sunday night.  Caregiver states no urine return since yesterday.  Pt has hx of downs syndrome.  Caregiver states pt c.o abd pain.  Caregiver states pt is at her normal mental status     Time seen by provider: 1:17 PM    This is a 51 y.o. female with a hx of down syndrome and osteopenia who presents with caretaker complaint of suprapubic catheter issues onset 2 days ago. She was seen here on 2/9 for the same complaint. The patient's caregiver suspects a possible blockage. Initially had 650 ccs in tube, but now averaging 400 ccs. She is also urinating vaginally. The patient often pulls the tube. She has a smaller tube placed currently. Waiting for urology appointment on Friday for change of larger tube. No other complaints at this time.       The history is provided by the patient and a caregiver.     Review of patient's allergies indicates:   Allergen Reactions    Caffeine      Past Medical History:   Diagnosis Date    Down syndrome     Osteopenia      Past Surgical History:   Procedure Laterality Date    HERNIA REPAIR       No family history on file.  Social History     Tobacco Use    Smoking status: Never Smoker    Smokeless tobacco: Never Used   Substance Use Topics    Alcohol use: No    Drug use: No     Review of Systems   Constitutional: Negative for fever.   HENT: Negative for sore throat.    Respiratory: Negative for shortness of breath.    Cardiovascular: Negative for chest pain.   Gastrointestinal: Negative for nausea.   Genitourinary: Negative for dysuria.   Musculoskeletal: Negative for back pain.   Skin: Negative for rash.   Neurological: Negative for weakness.   Hematological: Does not bruise/bleed easily.        Physical Exam     Initial Vitals [02/15/22 1045]   BP Pulse Resp Temp SpO2   124/83 63 16 97.4 °F (36.3 °C) 100 %      MAP       --         Physical Exam    Nursing note and vitals reviewed.  Constitutional: She appears well-developed and well-nourished. She is cooperative.  Non-toxic appearance. No distress.   HENT:   Head: Normocephalic and atraumatic.   Mouth/Throat: Oropharynx is clear and moist.   Eyes: Conjunctivae and EOM are normal. Pupils are equal, round, and reactive to light.   Neck: Neck supple. No thyromegaly present.   Normal range of motion.   Full passive range of motion without pain.     Cardiovascular: Normal rate, regular rhythm, normal heart sounds and normal pulses.   Pulmonary/Chest: Effort normal and breath sounds normal. No respiratory distress.   Abdominal: Abdomen is soft. Bowel sounds are normal. She exhibits no distension. There is no abdominal tenderness.   Genitourinary:    Genitourinary Comments: Urine appears clear, not foul smelling.     Musculoskeletal:         General: Normal range of motion.      Cervical back: Full passive range of motion without pain, normal range of motion and neck supple.     Neurological: She is alert and oriented to person, place, and time. She has normal strength. No cranial nerve deficit or sensory deficit. GCS score is 15. GCS eye subscore is 4. GCS verbal subscore is 5. GCS motor subscore is 6.   Skin: Skin is warm, dry and intact. No rash noted.   Psychiatric: She has a normal mood and affect. Her speech is normal and behavior is normal. Judgment and thought content normal.         ED Course   Suprapubic Tube    Date/Time: 2/15/2022 3:07 PM  Performed by: Kong Guerra MD  Authorized by: Kong Guerra MD   Consent: The procedure was performed in an emergent situation. Verbal consent obtained.  Risks and benefits: risks, benefits and alternatives were discussed  Consent given by: guardian  Patient understanding: patient states  understanding of the procedure being performed  Patient consent: the patient's understanding of the procedure matches consent given  Patient identity confirmed: arm band  Indications: catheter change and urinary retention  Local anesthesia used: no    Anesthesia:  Local anesthesia used: no    Sedation:  Patient sedated: no    Preparation: Patient was prepped and draped in the usual sterile fashion.  Suprapubic aspiration by: catheter  Catheter size: 12 Fr  Number of attempts: 1  Urine volume: 100 ml  Urine characteristics: cloudy  Patient tolerance: patient tolerated the procedure well with no immediate complications        Labs Reviewed   URINALYSIS, REFLEX TO URINE CULTURE - Abnormal; Notable for the following components:       Result Value    Appearance, UA Hazy (*)     Protein, UA Trace (*)     Occult Blood UA Trace (*)     Nitrite, UA Positive (*)     Leukocytes, UA 3+ (*)     All other components within normal limits    Narrative:     Specimen Source->Urine   URINALYSIS MICROSCOPIC - Abnormal; Notable for the following components:    WBC, UA 50 (*)     WBC Clumps, UA Occasional (*)     Bacteria Many (*)     All other components within normal limits    Narrative:     Specimen Source->Urine   CULTURE, URINE          Imaging Results    None          Medications - No data to display  Medical Decision Making:   History:   Old Medical Records: I decided to obtain old medical records.  Clinical Tests:   Lab Tests: Ordered and Reviewed          Scribe Attestation:   Scribe #1: I performed the above scribed service and the documentation accurately describes the services I performed. I attest to the accuracy of the note.    Attending Attestation:             Attending ED Notes:   Emergent evaluation of a 51-year-old female brought into the emergency room by caretaker for patient dislodging her suprapubic Ayers catheter.  Patient is afebrile, nontoxic-appearing stable vital signs.  Urinary analysis reveals urinary tract  infection.  Suprapubic catheter is replaced by me.  Patient has appointment with Urology this coming Friday.  The patient is extensively counseled on their diagnosis and treatment including all diagnostic, laboratory and physical exam findings.  The patient is discharged good condition and directed follow-up with their PCP in the next 24-48 hours.             Physician Attestation for scribe, ROXANNE Gurera, reviewed documentation as scribed in my presence, which is both accurate and complete.    Clinical Impression:     1. Urinary tract infection associated with cystostomy catheter, initial encounter    2. Proteinuria, unspecified type    3. Hematuria, unspecified type    4. Suprapubic catheter dysfunction, initial encounter           ED Disposition Condition    Discharge Stable        ED Prescriptions     Medication Sig Dispense Start Date End Date Auth. Provider    sulfamethoxazole-trimethoprim 800-160mg (BACTRIM DS) 800-160 mg Tab Take 1 tablet by mouth 2 (two) times daily. for 7 days 14 tablet 2/15/2022 2/22/2022 Kong Guerra MD        Follow-up Information     Follow up With Specialties Details Why Contact Info    [facility]  In 2 days  [address] [facility] [id number]           Kong Guerra MD  02/16/22 0619

## 2022-02-15 NOTE — ED TRIAGE NOTES
He patient has a suprapubic catheter that may have been pulled out slightly. She is here for an assessment.

## 2022-02-15 NOTE — FIRST PROVIDER EVALUATION
Emergency Department TeleTriage Encounter Note      CHIEF COMPLAINT    Chief Complaint   Patient presents with    suprapubic catheter out     Pt is at group home.  Care giver states she thinks suprapubic catheter is out Sunday night.  Caregiver states no urine return since yesterday.  Pt has hx of downs syndrome.  Caregiver states pt c.o abd pain.  Caregiver states pt is at her normal mental status       VITAL SIGNS   Initial Vitals [02/15/22 1045]   BP Pulse Resp Temp SpO2   124/83 63 16 97.4 °F (36.3 °C) 100 %      MAP       --            ALLERGIES    Review of patient's allergies indicates:   Allergen Reactions    Caffeine        PROVIDER TRIAGE NOTE  This is a teletriage evaluation of a 51 y.o. female presenting to the ED complaining of lower abdominal pain. Patient's caretaker states that the patient has not had any urinary output for over 24 hours. She has been complaining of lower abdominal pain. Denies other symptoms.     Initial orders will be placed and care will be transferred to an alternate provider when patient is roomed for a full evaluation. Any additional orders and the final disposition will be determined by that provider.           ORDERS  Labs Reviewed   URINALYSIS, REFLEX TO URINE CULTURE       ED Orders (720h ago, onward)    Start Ordered     Status Ordering Provider    02/15/22 1051 02/15/22 1050  Urinalysis, Reflex to Urine Culture Urine, Clean Catch  STAT         Ordered [facility]    02/15/22 1051 02/15/22 1050  Bladder scan  Once        Comments: PRN reason: per post tobar removal protocol or symptoms of urinary retention including urge to void, abdominal fullness, or distention.    Notify MD for bladder volume >300 mL        Ordered [facility]            Virtual Visit Note: The provider triage portion of this emergency department evaluation and documentation was performed via [employer], a HIPAA-compliant telemedicine application, in concert with a tele-presenter in the  room. A face to face patient evaluation with one of my colleagues will occur once the patient is placed in an emergency department room.      DISCLAIMER: This note was prepared with fos4X voice recognition transcription software. Garbled syntax, mangled pronouns, and other bizarre constructions may be attributed to that software system.

## 2022-02-17 LAB — BACTERIA UR CULT: ABNORMAL

## 2022-04-12 ENCOUNTER — HOSPITAL ENCOUNTER (OUTPATIENT)
Facility: OTHER | Age: 52
Discharge: HOME | End: 2022-04-28
Attending: EMERGENCY MEDICINE | Admitting: EMERGENCY MEDICINE
Payer: MEDICAID

## 2022-04-12 DIAGNOSIS — S32.9XXA CLOSED NONDISPLACED FRACTURE OF PELVIS, UNSPECIFIED PART OF PELVIS, INITIAL ENCOUNTER: ICD-10-CM

## 2022-04-12 DIAGNOSIS — Q90.9 DOWN SYNDROME: ICD-10-CM

## 2022-04-12 DIAGNOSIS — S32.591A PUBIC RAMUS FRACTURE, RIGHT, CLOSED, INITIAL ENCOUNTER: ICD-10-CM

## 2022-04-12 DIAGNOSIS — N18.31 STAGE 3A CHRONIC KIDNEY DISEASE: ICD-10-CM
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
## 2022-04-12 DIAGNOSIS — E78.5 HYPERLIPIDEMIA, UNSPECIFIED HYPERLIPIDEMIA TYPE: Primary | ICD-10-CM

## 2022-04-12 DIAGNOSIS — S32.810A CLOSED PELVIC RING FRACTURE, INITIAL ENCOUNTER: ICD-10-CM

## 2022-04-12 PROBLEM — N18.30 CKD (CHRONIC KIDNEY DISEASE) STAGE 3, GFR 30-59 ML/MIN: Status: ACTIVE | Noted: 2022-04-12

## 2022-04-12 PROBLEM — E11.9 TYPE 2 DIABETES MELLITUS: Status: ACTIVE | Noted: 2022-04-12

## 2022-04-12 LAB
ANION GAP SERPL CALC-SCNC: 11 MMOL/L (ref 8–16)
BASOPHILS # BLD AUTO: 0.08 K/UL (ref 0–0.2)
BASOPHILS NFR BLD: 0.9 % (ref 0–1.9)
BUN SERPL-MCNC: 19 MG/DL (ref 6–20)
CALCIUM SERPL-MCNC: 8.7 MG/DL (ref 8.7–10.5)
CHLORIDE SERPL-SCNC: 109 MMOL/L (ref 95–110)
CO2 SERPL-SCNC: 20 MMOL/L (ref 23–29)
CREAT SERPL-MCNC: 1.9 MG/DL (ref 0.5–1.4)
CTP QC/QA: YES
DIFFERENTIAL METHOD: ABNORMAL
EOSINOPHIL # BLD AUTO: 0.1 K/UL (ref 0–0.5)
EOSINOPHIL NFR BLD: 1.2 % (ref 0–8)
ERYTHROCYTE [DISTWIDTH] IN BLOOD BY AUTOMATED COUNT: 12.9 % (ref 11.5–14.5)
EST. GFR  (AFRICAN AMERICAN): 35 ML/MIN/1.73 M^2
EST. GFR  (NON AFRICAN AMERICAN): 30 ML/MIN/1.73 M^2
GLUCOSE SERPL-MCNC: 90 MG/DL (ref 70–110)
HCT VFR BLD AUTO: 36.1 % (ref 37–48.5)
HGB BLD-MCNC: 12 G/DL (ref 12–16)
IMM GRANULOCYTES # BLD AUTO: 0.04 K/UL (ref 0–0.04)
IMM GRANULOCYTES NFR BLD AUTO: 0.4 % (ref 0–0.5)
LYMPHOCYTES # BLD AUTO: 2.4 K/UL (ref 1–4.8)
LYMPHOCYTES NFR BLD: 26.1 % (ref 18–48)
MCH RBC QN AUTO: 33.1 PG (ref 27–31)
MCHC RBC AUTO-ENTMCNC: 33.2 G/DL (ref 32–36)
MCV RBC AUTO: 100 FL (ref 82–98)
MONOCYTES # BLD AUTO: 0.8 K/UL (ref 0.3–1)
MONOCYTES NFR BLD: 9.1 % (ref 4–15)
NEUTROPHILS # BLD AUTO: 5.8 K/UL (ref 1.8–7.7)
NEUTROPHILS NFR BLD: 62.3 % (ref 38–73)
NRBC BLD-RTO: 0 /100 WBC
PLATELET # BLD AUTO: 289 K/UL (ref 150–450)
PMV BLD AUTO: 9.1 FL (ref 9.2–12.9)
POTASSIUM SERPL-SCNC: 4 MMOL/L (ref 3.5–5.1)
RBC # BLD AUTO: 3.62 M/UL (ref 4–5.4)
SARS-COV-2 RDRP RESP QL NAA+PROBE: NEGATIVE
SODIUM SERPL-SCNC: 140 MMOL/L (ref 136–145)
WBC # BLD AUTO: 9.27 K/UL (ref 3.9–12.7)

## 2022-04-12 PROCEDURE — G0378 HOSPITAL OBSERVATION PER HR: HCPCS

## 2022-04-12 PROCEDURE — 99220 PR INITIAL OBSERVATION CARE,LEVL III: CPT | Mod: ,,, | Performed by: PHYSICIAN ASSISTANT

## 2022-04-12 PROCEDURE — 36415 COLL VENOUS BLD VENIPUNCTURE: CPT | Performed by: PHYSICIAN ASSISTANT

## 2022-04-12 PROCEDURE — 99220 PR INITIAL OBSERVATION CARE,LEVL III: ICD-10-PCS | Mod: ,,, | Performed by: PHYSICIAN ASSISTANT

## 2022-04-12 PROCEDURE — 99285 EMERGENCY DEPT VISIT HI MDM: CPT | Mod: 25

## 2022-04-12 PROCEDURE — 25000003 PHARM REV CODE 250: Performed by: EMERGENCY MEDICINE

## 2022-04-12 PROCEDURE — U0002 COVID-19 LAB TEST NON-CDC: HCPCS | Performed by: EMERGENCY MEDICINE

## 2022-04-12 PROCEDURE — 85025 COMPLETE CBC W/AUTO DIFF WBC: CPT | Performed by: EMERGENCY MEDICINE

## 2022-04-12 PROCEDURE — 83036 HEMOGLOBIN GLYCOSYLATED A1C: CPT | Performed by: PHYSICIAN ASSISTANT

## 2022-04-12 PROCEDURE — 80048 BASIC METABOLIC PNL TOTAL CA: CPT | Performed by: EMERGENCY MEDICINE

## 2022-04-12 RX ORDER — IBUPROFEN 200 MG
24 TABLET ORAL
Status: DISCONTINUED | OUTPATIENT
Start: 2022-04-12 | End: 2022-04-28 | Stop reason: HOSPADM

## 2022-04-12 RX ORDER — GLUCAGON 1 MG
1 KIT INJECTION
Status: DISCONTINUED | OUTPATIENT
Start: 2022-04-12 | End: 2022-04-28 | Stop reason: HOSPADM

## 2022-04-12 RX ORDER — ACETAMINOPHEN 500 MG
1000 TABLET ORAL
Status: COMPLETED | OUTPATIENT
Start: 2022-04-12 | End: 2022-04-12

## 2022-04-12 RX ORDER — IBUPROFEN 200 MG
16 TABLET ORAL
Status: DISCONTINUED | OUTPATIENT
Start: 2022-04-12 | End: 2022-04-28 | Stop reason: HOSPADM

## 2022-04-12 RX ORDER — SODIUM CHLORIDE 0.9 % (FLUSH) 0.9 %
10 SYRINGE (ML) INJECTION
Status: DISCONTINUED | OUTPATIENT
Start: 2022-04-12 | End: 2022-04-28 | Stop reason: HOSPADM

## 2022-04-12 RX ORDER — TALC
6 POWDER (GRAM) TOPICAL NIGHTLY PRN
Status: DISCONTINUED | OUTPATIENT
Start: 2022-04-12 | End: 2022-04-28 | Stop reason: HOSPADM

## 2022-04-12 RX ADMIN — ACETAMINOPHEN 1000 MG: 500 TABLET, FILM COATED ORAL at 05:04

## 2022-04-12 NOTE — ED TRIAGE NOTES
Chief Complaint   Patient presents with    Fall     Pt with complaints of pain in the left hip, right buttock, and lower abdomen after falling on Saturday. Pt has a tobar catheter; has a hx of MR.     Pt presents to ED with c/o pain in left hip and lower abd after falling down stairs at her care facility on Saturday. Pt has multiple caregivers, night caregiver at bedside states no one has documented the fall or injuries. Also states that she is usually off balance but can walk. Her current state is not her normal baseline for mobility. Pt has Fernanda, a tobar cath and hx of MR.

## 2022-04-12 NOTE — ED PROVIDER NOTES
Encounter Date: 4/12/2022    SCRIBE #1 NOTE: INiya, am scribing for, and in the presence of, Bettie Rudolph MD.       History     Chief Complaint   Patient presents with    Fall     Pt with complaints of pain in the left hip, right buttock, and lower abdomen after falling on Saturday. Pt has a tobar catheter; has a hx of MR.     Time seen by provider: 5:14 PM    This is a 51 y.o. female who presents with complaint of right buttock pain and left hip pain s/p fall 3 days ago. Her caregiver reports she fell down 4 steps though her fall was not reported and it is unclear how she landed. She states she normally has difficulty with balance but able to ambulate. The caregiver reports the patient was able to communicate complaints of pain in the pelvic area, both hips, back, and right buttock. She states she is normally able to ambulate but since the fall has been unable to bear weight and her eyes tear upon transfers from bed to wheelchair. She notes the patient is fatigued and has some ecchymosis on her legs. She also reports lower abdomen pain though this is a chronic symptom. Of note patient has a PMHx of MR.    The history is provided by a caregiver. The history is limited by the condition of the patient.     Review of patient's allergies indicates:   Allergen Reactions    Caffeine      Past Medical History:   Diagnosis Date    Down syndrome     Osteopenia      Past Surgical History:   Procedure Laterality Date    HERNIA REPAIR       No family history on file.  Social History     Tobacco Use    Smoking status: Never Smoker    Smokeless tobacco: Never Used   Substance Use Topics    Alcohol use: No    Drug use: No     Review of Systems   Unable to perform ROS: Patient unresponsive   Constitutional: Positive for fatigue.   Gastrointestinal: Positive for abdominal pain.   Genitourinary: Positive for pelvic pain. Negative for dysuria.   Musculoskeletal: Positive for back pain. Negative for neck pain.         Positive for hip pain. Positive for right buttock pain.   Skin: Negative for color change and wound.        Positive for ecchymosis on legs.   Neurological: Negative for dizziness, weakness, numbness and headaches.       Physical Exam     Initial Vitals [04/12/22 1621]   BP Pulse Resp Temp SpO2   (!) 107/57 77 18 97.6 °F (36.4 °C) 99 %      MAP       --         Physical Exam    Nursing note and vitals reviewed.  Constitutional: She appears well-developed and well-nourished.   HENT:   Head: Normocephalic.   Mouth/Throat: Oropharynx is clear and moist.   Eyes: Conjunctivae and EOM are normal.   Neck: Neck supple.   Normal range of motion.  Cardiovascular: Normal rate, regular rhythm, normal heart sounds and intact distal pulses.   2+ DP/PT pulses bilaterally.   Pulmonary/Chest: Breath sounds normal. No respiratory distress. She has no wheezes. She has no rales.   Abdominal: Abdomen is soft. Bowel sounds are normal. She exhibits no distension. There is no abdominal tenderness. There is no rebound.   Musculoskeletal:         General: Tenderness present.      Cervical back: Normal range of motion and neck supple.      Comments: Mild lumbar midline tenderness. Bilateral hip tenderness with no overlying edema, ecchymosis, or erythema.  Increased pain with right and left hip range of motion.     Neurological: She is alert and oriented to person, place, and time.   Skin: Skin is warm and dry. Capillary refill takes less than 2 seconds. No rash noted.   Psychiatric: She has a normal mood and affect.         ED Course   Procedures  Labs Reviewed   CBC W/ AUTO DIFFERENTIAL - Abnormal; Notable for the following components:       Result Value    RBC 3.62 (*)     Hematocrit 36.1 (*)      (*)     MCH 33.1 (*)     MPV 9.1 (*)     All other components within normal limits   BASIC METABOLIC PANEL   SARS-COV-2 RDRP GENE          Imaging Results           CT Pelvis Without Contrast (Final result)  Result time 04/12/22 19:59:25     Final result by Randy Avila MD (04/12/22 19:59:25)                 Impression:      Vertical nondisplaced fracture of the right pubic ramus adjacent to the symphysis.    Contralateral left sacral kali are nondisplaced fracture of the contralateral pelvic ring.    No evidence of SI joint or pubic symphysis diastasis.    Negative hips.    This report was flagged in Epic as abnormal.      Electronically signed by: Randy Avila  Date:    04/12/2022  Time:    19:59             Narrative:    EXAMINATION:  CT PELVIS WITHOUT CONTRAST    CLINICAL HISTORY:  Pelvic fracture;    TECHNIQUE:  Axial CT images of the pelvis were obtained.    COMPARISON:  Plain film, same day    FINDINGS:  The cortical irregularity at the medial aspect of the pubic symphysis at the conjoined superior and inferior pubic ramus demonstrates a nondisplaced fracture.  There is no symphysis diastasis.  No extension into the inferior or superior ramus is evident.    SI joints appear intact.  A small nondisplaced contralateral left sacral kali are fracture is noted best seen on coronal image 120 of series 200..    Spondylosis in the lumbosacral junction is evident.    Suprapubic catheter is noted.  There is no evidence of pelvic mass or free fluid.  The bladder wall is thickened.  A large dystrophic calcification in the left side of the pelvis is noted.  Femoral heads and femoroacetabular joints appear maintained.                               CT Lumbar Spine Without Contrast (Final result)  Result time 04/12/22 20:03:55    Final result by Randy Avila MD (04/12/22 20:03:55)                 Impression:      Lumbar spondylosis as described with no evidence of acute lumbar fracture.    Nondisplaced left S1-S2 sacral kali are fracture as described on CT of the pelvis.      Electronically signed by: Randy Avila  Date:    04/12/2022  Time:    20:03             Narrative:    EXAMINATION:  CT LUMBAR SPINE WITHOUT CONTRAST    CLINICAL  HISTORY:  Low back pain, trauma;    TECHNIQUE:  Low-dose axial, sagittal and coronal reformations are obtained through the lumbar spine.  Contrast was not administered.    COMPARISON:  None.    FINDINGS:  Alignment of the lumbar spine demonstrates multilevel spondylosis.  Schmorl's node compression deformity the superior endplate of L1 is noted, age indeterminate but likely remote.  There is mild uncovering of the disc at L4-5 with anterolisthesis and disc vacuum phenomenon.    No acute fracture or central canal stenosis is evident with multilevel spondylosis and facet arthropathy most severe at L4-5 and L5-S1.    The nondisplaced cortical fracture of the left sacral kali are segments at S 1 and S2 are noted with no SI joint diastasis.  There is no adjacent hematoma.  The surrounding retroperitoneal and pelvic soft tissue structures appear unremarkable with cortical scarring suggested in the left posterior kidney and prominent extrarenal pelvis, left greater than right.  The aorta is normal in caliber.                               X-Ray Lumbar Spine Ap And Lateral (Final result)  Result time 04/12/22 18:40:12    Final result by Dione Trinidad MD (04/12/22 18:40:12)                 Impression:      Mild anterior wedging of L1 vertebral body, which may be due to remote fracture or normal variant.    Grade 1 anterolisthesis of L4 on L5.  If pain is out of proportion to the radiological findings, recommend CT or MRI when clinically appropriate.      Electronically signed by: Dione Trinidad  Date:    04/12/2022  Time:    18:40             Narrative:    EXAMINATION:  LUMBAR SPINE    CLINICAL HISTORY:  Low back pain.    TECHNIQUE:  AP, lateral, and coned lateral views of the lower lumbar spine were submitted.    COMPARISON:  CTA abdomen and pelvis 05/16/2018    FINDINGS:  There is mild dextroscoliosis of the lumbar spine. There is mild anterior wedging of the L1 vertebral body with prominent superior endplate spur.   Similar appearance is seen on the CT abdomen pelvis from 05/16/2018.  There is grade 1 anterolisthesis of L4 on L5 with intervertebral disc space narrowing.  Small marginal osteophytes are seen throughout.  Facet arthrosis is seen at L4/L5 and L5/S1.                               X-Ray Hips Bilateral 2 View Incl AP Pelvis (Final result)  Result time 04/12/22 18:17:11    Final result by Dione Trinidad MD (04/12/22 18:17:11)                 Impression:      Cortical irregularity at the cephalad aspect of the right superior ramus.  Possibility of fracture should be considered.  If warranted, consider CT pelvis.      Electronically signed by: Dione Trinidad  Date:    04/12/2022  Time:    18:17             Narrative:    EXAMINATION:  TWO VIEWS OF BILATERAL HIPS    CLINICAL HISTORY:  Hip pain.    TECHNIQUE:  AP and lateral view of the both hips    COMPARISON:  None.    FINDINGS:  Two views of the both hips demonstrate cortical irregularity at the cephalad aspect of the right superior pubic ramus medially.  The femoral heads appear to be intact.  There is mild degenerative changes at the hip joints and sacral iliac joints.  Greater degenerative change is seen at the visualized lower lumbar spine.  Mild dextroscoliosis is noted.                                 Medications   acetaminophen tablet 1,000 mg (1,000 mg Oral Given 4/12/22 8914)     Medical Decision Making:   History:   Old Medical Records: I decided to obtain old medical records.  Old Records Summarized: other records and records from another hospital.  Initial Assessment:   5:14PM:  Patient is a 51-year-old female who presents to the emergency department with bilateral hip and back pain after a fall.  Patient appears well, nontoxic.  She does seem to have tenderness on exam and has difficulty ranging both hips secondary to pain.  Will plan for analgesia, imaging, will continue to follow and reassess.  Independently Interpreted Test(s):   I have ordered and  independently interpreted X-rays - see prior notes.  Clinical Tests:   Radiological Study: Ordered and Reviewed  Other:   I have discussed this case with another health care provider.    6:21PM:  There is concern for a pubic rami fracture on x-ray.  Will plan for CT for further evaluation.  Will continue to follow.    8:36 PM:  Patient's CT confirms fracture of the right pubic rami along with a left sacral ala fracture.  I discussed the patient with  Dr. Oneal, orthopedics, old who recommends nonoperative management and partial weight-bearing on the left side, along with admission for PT and rehab placement.    9:07 PM:  I discussed the patient with Angelique Christianson p.a.-C, who is agreeable to plan for admission under Dr. Maki.                  Scribe Attestation:   Scribe #1: I performed the above scribed service and the documentation accurately describes the services I performed. I attest to the accuracy of the note.               Physician Attestation for Scribe: I, Bettie Rudolph, reviewed documentation as scribed in my presence, which is both accurate and complete.  Clinical Impression:   Final diagnoses:  [W19.XXXA] Fall  [S32.9XXA] Closed nondisplaced fracture of pelvis, unspecified part of pelvis, initial encounter (Primary)          ED Disposition Condition    Observation               Bettie Rudolph MD  04/12/22 2110

## 2022-04-13 PROBLEM — S32.591A PUBIC RAMUS FRACTURE, RIGHT, CLOSED, INITIAL ENCOUNTER: Status: RESOLVED | Noted: 2022-04-12 | Resolved: 2022-04-13

## 2022-04-13 LAB
ESTIMATED AVG GLUCOSE: 100 MG/DL (ref 68–131)
HBA1C MFR BLD: 5.1 % (ref 4–5.6)
POCT GLUCOSE: 107 MG/DL (ref 70–110)
POCT GLUCOSE: 112 MG/DL (ref 70–110)
POCT GLUCOSE: 175 MG/DL (ref 70–110)
POCT GLUCOSE: 66 MG/DL (ref 70–110)

## 2022-04-13 PROCEDURE — 63600175 PHARM REV CODE 636 W HCPCS: Performed by: INTERNAL MEDICINE

## 2022-04-13 PROCEDURE — 96372 THER/PROPH/DIAG INJ SC/IM: CPT | Performed by: INTERNAL MEDICINE

## 2022-04-13 PROCEDURE — G0378 HOSPITAL OBSERVATION PER HR: HCPCS

## 2022-04-13 PROCEDURE — 99226 PR SUBSEQUENT OBSERVATION CARE,LEVEL III: ICD-10-PCS | Mod: ,,, | Performed by: INTERNAL MEDICINE

## 2022-04-13 PROCEDURE — 25000003 PHARM REV CODE 250: Performed by: INTERNAL MEDICINE

## 2022-04-13 PROCEDURE — 99226 PR SUBSEQUENT OBSERVATION CARE,LEVEL III: CPT | Mod: ,,, | Performed by: INTERNAL MEDICINE

## 2022-04-13 PROCEDURE — 94761 N-INVAS EAR/PLS OXIMETRY MLT: CPT

## 2022-04-13 RX ORDER — ACETAMINOPHEN 325 MG/1
650 TABLET ORAL EVERY 4 HOURS PRN
Status: DISCONTINUED | OUTPATIENT
Start: 2022-04-13 | End: 2022-04-28 | Stop reason: HOSPADM

## 2022-04-13 RX ORDER — INSULIN ASPART 100 [IU]/ML
0-5 INJECTION, SOLUTION INTRAVENOUS; SUBCUTANEOUS
Status: DISCONTINUED | OUTPATIENT
Start: 2022-04-13 | End: 2022-04-15

## 2022-04-13 RX ORDER — OXYBUTYNIN CHLORIDE 5 MG/1
10 TABLET, EXTENDED RELEASE ORAL DAILY
Status: DISCONTINUED | OUTPATIENT
Start: 2022-04-14 | End: 2022-04-28 | Stop reason: HOSPADM

## 2022-04-13 RX ORDER — FOLIC ACID 0.4 MG
400 TABLET ORAL DAILY
Status: DISCONTINUED | OUTPATIENT
Start: 2022-04-14 | End: 2022-04-13

## 2022-04-13 RX ORDER — FOLIC ACID 1 MG/1
1 TABLET ORAL DAILY
Status: DISCONTINUED | OUTPATIENT
Start: 2022-04-14 | End: 2022-04-28 | Stop reason: HOSPADM

## 2022-04-13 RX ORDER — HEPARIN SODIUM 5000 [USP'U]/ML
5000 INJECTION, SOLUTION INTRAVENOUS; SUBCUTANEOUS EVERY 8 HOURS
Status: DISCONTINUED | OUTPATIENT
Start: 2022-04-13 | End: 2022-04-28 | Stop reason: HOSPADM

## 2022-04-13 RX ORDER — LEVOTHYROXINE SODIUM 50 UG/1
50 TABLET ORAL
Status: DISCONTINUED | OUTPATIENT
Start: 2022-04-14 | End: 2022-04-28 | Stop reason: HOSPADM

## 2022-04-13 RX ORDER — ASCORBIC ACID 500 MG
500 TABLET ORAL DAILY
Status: DISCONTINUED | OUTPATIENT
Start: 2022-04-14 | End: 2022-04-28 | Stop reason: HOSPADM

## 2022-04-13 RX ORDER — FERROUS SULFATE, DRIED 160(50) MG
1 TABLET, EXTENDED RELEASE ORAL 2 TIMES DAILY WITH MEALS
Status: DISCONTINUED | OUTPATIENT
Start: 2022-04-13 | End: 2022-04-28 | Stop reason: HOSPADM

## 2022-04-13 RX ORDER — ATORVASTATIN CALCIUM 10 MG/1
10 TABLET, FILM COATED ORAL DAILY
Status: DISCONTINUED | OUTPATIENT
Start: 2022-04-14 | End: 2022-04-28 | Stop reason: HOSPADM

## 2022-04-13 RX ORDER — DICYCLOMINE HYDROCHLORIDE 10 MG/1
10 CAPSULE ORAL
Status: DISCONTINUED | OUTPATIENT
Start: 2022-04-13 | End: 2022-04-13

## 2022-04-13 RX ORDER — SIMETHICONE 80 MG
80 TABLET,CHEWABLE ORAL EVERY 6 HOURS PRN
Status: DISCONTINUED | OUTPATIENT
Start: 2022-04-13 | End: 2022-04-28 | Stop reason: HOSPADM

## 2022-04-13 RX ADMIN — Medication 1 TABLET: at 04:04

## 2022-04-13 RX ADMIN — HEPARIN SODIUM 5000 UNITS: 5000 INJECTION INTRAVENOUS; SUBCUTANEOUS at 09:04

## 2022-04-13 NOTE — PROGRESS NOTES
[facility] Surg [facility] Medicine  Progress Note    Patient Name: Shannan Medrano  MRN: [id number]  Patient Class: OP- Observation   Admission Date: 4/12/2022  Length of Stay: 0 days  Attending Physician: Jermaine Maki MD  Primary Care Provider: Primary Doctor No        Subjective:     Principal Problem:Pubic ramus fracture, right, closed, initial encounter    HPI:  Ms. Shannan Medrano is a 51 y.o. female, with PMH of Down Syndrome, T2DM, HLD, CKD-3, Cardiomyopathy, urinary retention s/p suprapubic catheter placement, who presented to [facility] ED on 4/12/22 due to left hip & right buttocks pain after falling on Saturday. She further notes chronic lower abdominal pain. Per her caregiver's report, she fell down 4 steps. She states she has balance issues, but can walk, but has been unable to do so since her fall due to pain with bearing weight. She was evaluated in the ED with labs showing normal H&H, and Cr of 1.9. X-ray of the hips showed possible fracture of the right superior ramus. A CT of the pelvis showed a nondisplaced fracture of the right pubic ramus adjacent tot he symphysis. There was also a contralateral left sacral nondisplaced fracture of the pelvic ring. X-ray of the lumbar spine showed wedging of L1 vertebral body which may be remote or normal variant as well as Gr 1 anterolisthesis of L4 on L5. She was placed on OBS.       Overview/Hospital Course:  No notes on file    Interval History: Patient is awake and alert this morning.  She is mostly nonverbal, but points to suprapubic area as pain.  Looks comfortable in bed without distress.    Review of Systems   Unable to perform ROS: Patient nonverbal   Constitutional:  Negative for chills and fever.   Respiratory:  Negative for shortness of breath.    Objective:     Vital Signs (Most Recent):  Temp: 97.7 °F (36.5 °C) (04/13/22 1200)  Pulse: 63 (04/13/22 1400)  Resp: 17 (04/13/22 1200)  BP: 104/63 (04/13/22 1200)  SpO2: 96 % (04/13/22 1200)  Vital Signs (24h Range):  Temp:  [96.1 °F (35.6 °C)-98.8 °F (37.1 °C)] 97.7 °F (36.5 °C)  Pulse:  [51-79] 63  Resp:  [16-20] 17  SpO2:  [94 %-100 %] 96 %  BP: ()/(53-75) 104/63     Weight: 44 kg (97 lb)  Body mass index is 18.94 kg/m².    Intake/Output Summary (Last 24 hours) at 4/13/2022 1516  Last data filed at 4/13/2022 0600  Gross per 24 hour   Intake 120 ml   Output 700 ml   Net -580 ml      Physical Exam  Constitutional:       General: She is not in acute distress.     Appearance: She is normal weight. She is ill-appearing (chronically).   HENT:      Right Ear: External ear normal.      Left Ear: External ear normal.      Nose: Nose normal.      Mouth/Throat:      Mouth: Mucous membranes are moist.      Pharynx: No oropharyngeal exudate.   Eyes:      General: No scleral icterus.     Pupils: Pupils are equal, round, and reactive to light.   Cardiovascular:      Rate and Rhythm: Normal rate and regular rhythm.      Pulses: Normal pulses.      Heart sounds: Normal heart sounds.   Pulmonary:      Effort: Pulmonary effort is normal. No respiratory distress.      Breath sounds: Normal breath sounds. No wheezing or rales.   Abdominal:      General: Bowel sounds are normal. There is no distension.      Tenderness: There is no abdominal tenderness.   Musculoskeletal:         General: Normal range of motion.      Cervical back: Normal range of motion and neck supple.      Right lower leg: No edema.      Left lower leg: No edema.   Skin:     General: Skin is warm and dry.   Neurological:      Mental Status: She is alert. Mental status is at baseline.   Psychiatric:         Mood and Affect: Mood normal.         Behavior: Behavior normal.       Significant Labs: All pertinent labs within the past 24 hours have been reviewed.    Significant Imaging: I have reviewed all pertinent imaging results/findings within the past 24 hours.      Assessment/Plan:      Pelvic ring fracture and Pubic Ramus Fracture  Patient  presented after a recent fall with pain.  Her imaging showed right pubic ramus fracture and left pelvic ring fracture.  Ortho consulted and awaiting recommendations   -PRN pain meds  -Non weight bearing overnight   -DVT prophylaxis with SQH for now      HLD (hyperlipidemia)  -Continue Atorvastatin 10 mg QD       CKD (chronic kidney disease) stage 3, GFR 30-59 ml/min  Cr is 1.9 on admission, which is better than recent measures   - avoid nephrotoxins   - renally dose meds       Type 2 diabetes mellitus  Home Meds:  Metformin 500mg  A1C 5.1 on admission  - Diabetic diet   - SSI with accuchecks AC/HS        Down syndrome  - history noted       VTE Risk Mitigation (From admission, onward)         Ordered     heparin (porcine) injection 5,000 Units  Every 8 hours         04/13/22 1523     IP VTE LOW RISK PATIENT  Once         04/12/22 2327     Place sequential compression device  Until discontinued         04/12/22 2327                Discharge Planning   COMPA:      Code Status: Full Code   Is the patient medically ready for discharge?:     Reason for patient still in hospital (select all that apply): Patient trending condition, Treatment and Consult recommendations  Discharge Plan A: Home                  Jermaine Maki MD  Department of Hospital Medicine   [facility] - Med Surg ([facility])

## 2022-04-13 NOTE — CONSULTS
Ortho Spine    HPI: Ms. Shannan Medrano is a 51 y.o. female, with PMH of Down Syndrome, T2DM, HLD, CKD-3, Cardiomyopathy, urinary retention s/p suprapubic catheter placement, who presented to [facility] on 4/12/22 due to left hip & right buttocks pain after falling on Saturday. She further notes chronic lower abdominal pain. Per her caregiver's report, she fell down 4 steps. She states she has balance issues, but can walk, but has been unable to do so since her fall due to pain with bearing weight. She was evaluated in the ED with labs showing normal H&H, and Cr of 1.9. X-ray of the hips showed possible fracture of the right superior ramus. A CT of the pelvis showed a nondisplaced fracture of the right pubic ramus adjacent tot he symphysis. There was also a contralateral left sacral nondisplaced fracture of the pelvic ring. X-ray of the lumbar spine showed wedging of L1 vertebral body which may be remote or normal variant as well as Gr 1 anterolisthesis of L4 on L5. She was placed on OBS.              Past Medical History:   Diagnosis Date    Down syndrome      Osteopenia                 Past Surgical History:   Procedure Laterality Date    HERNIA REPAIR                  Review of patient's allergies indicates:   Allergen Reactions    Caffeine           No current facility-administered medications on file prior to encounter.           Current Outpatient Medications on File Prior to Encounter   Medication Sig    apixaban (ELIQUIS) 5 mg Tab Take 5 mg by mouth 2 (two) times daily.    ascorbic acid, vitamin C, (VITAMIN C) 500 MG tablet Take 500 mg by mouth once daily.    folic acid (FOLVITE) 400 MCG tablet Take 400 mcg by mouth once daily.    levothyroxine (SYNTHROID) 50 MCG tablet Take 50 mcg by mouth before breakfast.    metFORMIN (GLUMETZA) 500 MG (MOD) 24 hr tablet Take 500 mg by mouth daily with breakfast.    multivitamin (THERAGRAN) per tablet Take 1 tablet by mouth once daily.    oxybutynin (DITROPAN-XL)  10 MG 24 hr tablet Take 10 mg by mouth once daily.    alendronate (FOSAMAX) 5 MG Tab Take by mouth before breakfast.    atorvastatin (LIPITOR) 10 MG tablet Take 10 mg by mouth once daily.    azelastine (ASTELIN) 137 mcg (0.1 %) nasal spray 1 spray by Nasal route 2 (two) times daily.    calcium-vitamin D3 500 mg(1,250mg) -200 unit per tablet Take 1 tablet by mouth 2 (two) times daily with meals.    dicyclomine (BENTYL) 10 MG capsule Take 10 mg by mouth 4 (four) times daily before meals and nightly.    guaifenesin 100 mg/5 ml (ROBITUSSIN) 100 mg/5 mL syrup Take 200 mg by mouth 3 (three) times daily as needed for Cough.    hydrocortisone 2.5 % cream Apply topically 2 (two) times daily.    ibuprofen (ADVIL,MOTRIN) 800 MG tablet Take 1 tablet (800 mg total) by mouth every 6 (six) hours as needed for Pain.    lactulose (CHRONULAC) 10 gram/15 mL solution Take by mouth 3 (three) times daily.    medroxyPROGESTERone (DEPO-PROVERA) 150 mg/mL injection Inject 1 mL (150 mg total) into the muscle every 3 (three) months.    potassium chloride SA (K-DUR,KLOR-CON) 20 MEQ tablet Take 20 mEq by mouth once daily.    simethicone (MYLICON) 80 MG chewable tablet Take 80 mg by mouth every 6 (six) hours as needed for Flatulence.    white petrolatum-mineral oil (EUCERIN) Crea Apply topically as needed.      Family History    None              Tobacco Use    Smoking status: Never Smoker    Smokeless tobacco: Never Used   Substance and Sexual Activity    Alcohol use: No    Drug use: No    Sexual activity: Never      Review of Systems: otherwise as stated in HPI    EXAM  Hard to follow though exam but moves all extremities volitianally.  She is seated confortably with a figure of 4 with her right leg.  Minor ttp over pubic area and left sacral area.   NEg SLR.  NVi distally otherwise    CT L-spine: No fractures    CT Pelvis: minimally impacted left Zone 1 Sacral ala frx and a non-displaced right pubic ramus fracture    A/P:  Pelvic fracture, essentially non-displaced and with a stable pattern. She can start PWB to left lower extremity but may progress to WBAT as he pain eases. She will likely need some rehab to avoid staying in bed. F/u as outpatient in 3 weeks or re consult PRN.

## 2022-04-13 NOTE — ASSESSMENT & PLAN NOTE
Home Meds:  Metformin 500mg  A1C 5.1 on admission  - Diabetic diet   - SSI with accuchecks AC/HS

## 2022-04-13 NOTE — ASSESSMENT & PLAN NOTE
- Ms. Shannan Medrano presents due to pain with attempts at ambulation  Left pelvic ring fracture   - imaging shows right pubic ramus fracture and left pelvic ring fracture   - PRN pain meds  - Ortho consulted   - non weight bearing overnight

## 2022-04-13 NOTE — ASSESSMENT & PLAN NOTE
- Cr is 1.9 today, which is better than recent measures   - avoid nephrotoxins   - renally dose meds

## 2022-04-13 NOTE — ASSESSMENT & PLAN NOTE
Cr is 1.9 on admission, which is better than recent measures   - avoid nephrotoxins   - renally dose meds

## 2022-04-13 NOTE — PLAN OF CARE
AAOX1. VSS. Two person assist per adls and transfers, voiding clear yellow urine per suprapubic catheter to gu bag. Pericare q2 or as needed. Turn q2. Positive for hip pain, right buttock pain, and ecchymosis @ BLE. Skin warm to touch with no skin breakdown noted. HOB 20-30 degrees with call bell and bedside table within reach, bed in lowest position with hourly purposeful rounding. Alarms on and audible with door opened. Calm and pleasant, will continue to monitor.   Problem: Adult Inpatient Plan of Care  Goal: Plan of Care Review  Outcome: Ongoing, Progressing  Goal: Patient-Specific Goal (Individualized)  Outcome: Ongoing, Progressing  Goal: Absence of Hospital-Acquired Illness or Injury  Outcome: Ongoing, Progressing  Goal: Optimal Comfort and Wellbeing  Outcome: Ongoing, Progressing  Goal: Readiness for Transition of Care  Outcome: Ongoing, Progressing     Problem: Diabetes Comorbidity  Goal: Blood Glucose Level Within Targeted Range  Outcome: Ongoing, Progressing     Problem: Fall Injury Risk  Goal: Absence of Fall and Fall-Related Injury  Outcome: Ongoing, Progressing     Problem: Skin Injury Risk Increased  Goal: Skin Health and Integrity  Outcome: Ongoing, Progressing

## 2022-04-13 NOTE — PROGRESS NOTES
04/12/22 8747   Mando Risk Assessment   Sensory Perception 3-->slightly limited   Moisture 4-->rarely moist   Activity 2-->chairfast   Mobility 2-->very limited   Nutrition 3-->adequate   Friction and Shear 2-->potential problem   Mando Score 16   [facility] ([facility])  Wound Care  Progress Note    Patient Name: Shannan Medrano  MRN: [id number]  Admission Date: 4/12/2022  Hospital Length of Stay: 0 days  Attending Physician: Jermaine Maki MD  Primary Care Provider: Primary Doctor No   No new subjective & objective note has been filed under this hospital service since the last note was generated.    Assessment/Plan:         HAPI prevention , Mando <18 PIP orders placed     Melissa Byrnes LPN  Wound Care  [facility] ([facility])

## 2022-04-13 NOTE — H&P
[facility] Medicine  History & Physical    Patient Name: Shannan Medrano  MRN: [id number]  Patient Class: OP- Observation  Admission Date: 4/12/2022  Attending Physician: Jermaine Maki MD   Primary Care Provider: Primary Doctor No         Patient information was obtained from patient, past medical records and ER records.     Subjective:     Principal Problem:Pubic ramus fracture, right, closed, initial encounter    Chief Complaint:   Chief Complaint   Patient presents with    Fall     Pt with complaints of pain in the left hip, right buttock, and lower abdomen after falling on Saturday. Pt has a tobar catheter; has a hx of MR.        HPI: Ms. Shannan Medrano is a 51 y.o. female, with PMH of Down Syndrome, T2DM, HLD, CKD-3, Cardiomyopathy, urinary retention s/p suprapubic catheter placement, who [place] [facility] [place] on 4[place]12/22 due to left hip & right buttocks pain after falling on Saturday. She further notes chronic lower abdominal pain. Per her caregiver's report, she fell down 4 steps. She states she has balance issues, but can walk, but has been unable to do so since her fall due to pain with bearing weight. She was evaluated in the ED with labs showing normal H&H, and Cr of 1.9. X-ray of the hips showed possible fracture of the right superior ramus. A CT of the pelvis showed a nondisplaced fracture of the right pubic ramus adjacent tot he symphysis. There was also a contralateral left sacral nondisplaced fracture of the pelvic ring. X-ray of the lumbar spine showed wedging of L1 vertebral body which may be remote or normal variant as well as Gr 1 anterolisthesis of L4 on L5. She was placed on OBS.       Past Medical History:   Diagnosis Date    Down syndrome     Osteopenia        Past Surgical History:   Procedure Laterality Date    HERNIA REPAIR         Review of patient's allergies indicates:   Allergen Reactions    Caffeine        No current facility-administered medications on file  prior to encounter.     Current Outpatient Medications on File Prior to Encounter   Medication Sig    apixaban (ELIQUIS) 5 mg Tab Take 5 mg by mouth 2 (two) times daily.    ascorbic acid, vitamin C, (VITAMIN C) 500 MG tablet Take 500 mg by mouth once daily.    folic acid (FOLVITE) 400 MCG tablet Take 400 mcg by mouth once daily.    levothyroxine (SYNTHROID) 50 MCG tablet Take 50 mcg by mouth before breakfast.    metFORMIN (GLUMETZA) 500 MG (MOD) 24 hr tablet Take 500 mg by mouth daily with breakfast.    multivitamin (THERAGRAN) per tablet Take 1 tablet by mouth once daily.    oxybutynin (DITROPAN-XL) 10 MG 24 hr tablet Take 10 mg by mouth once daily.    alendronate (FOSAMAX) 5 MG Tab Take by mouth before breakfast.    atorvastatin (LIPITOR) 10 MG tablet Take 10 mg by mouth once daily.    azelastine (ASTELIN) 137 mcg (0.1 %) nasal spray 1 spray by Nasal route 2 (two) times daily.    calcium-vitamin D3 500 mg(1,250mg) -200 unit per tablet Take 1 tablet by mouth 2 (two) times daily with meals.    dicyclomine (BENTYL) 10 MG capsule Take 10 mg by mouth 4 (four) times daily before meals and nightly.    guaifenesin 100 mg/5 ml (ROBITUSSIN) 100 mg/5 mL syrup Take 200 mg by mouth 3 (three) times daily as needed for Cough.    hydrocortisone 2.5 % cream Apply topically 2 (two) times daily.    ibuprofen (ADVIL,MOTRIN) 800 MG tablet Take 1 tablet (800 mg total) by mouth every 6 (six) hours as needed for Pain.    lactulose (CHRONULAC) 10 gram/15 mL solution Take by mouth 3 (three) times daily.    medroxyPROGESTERone (DEPO-PROVERA) 150 mg/mL injection Inject 1 mL (150 mg total) into the muscle every 3 (three) months.    potassium chloride SA (K-DUR,KLOR-CON) 20 MEQ tablet Take 20 mEq by mouth once daily.    simethicone (MYLICON) 80 MG chewable tablet Take 80 mg by mouth every 6 (six) hours as needed for Flatulence.    white petrolatum-mineral oil (EUCERIN) Crea Apply topically as needed.     Family History     None       Tobacco Use    Smoking status: Never Smoker    Smokeless tobacco: Never Used   Substance and Sexual Activity    Alcohol use: No    Drug use: No    Sexual activity: Never     Review of Systems   Unable to perform ROS: Other (Patient answers yes to all questions.)   Objective:     Vital Signs (Most Recent):  Temp: 98.4 °F (36.9 °C) (04/12/22 2255)  Pulse: (!) 52 (04/12/22 2255)  Resp: 20 (04/12/22 2255)  BP: (!) 98/56 (04/12/22 2255)  SpO2: 97 % (04/12/22 2255)   Vital Signs (24h Range):  Temp:  [97.6 °F (36.4 °C)-98.4 °F (36.9 °C)] 98.4 °F (36.9 °C)  Pulse:  [52-77] 52  Resp:  [18-20] 20  SpO2:  [94 %-100 %] 97 %  BP: ()/(53-75) 98/56        There is no height or weight on file to calculate BMI.    Physical Exam  Vitals and nursing note reviewed.   Constitutional:       General: She is not in acute distress.     Appearance: She is well-developed and normal weight. She is not ill-appearing, toxic-appearing or diaphoretic.   HENT:      Head: Normocephalic and atraumatic.   Eyes:      General: No scleral icterus.        Right eye: No discharge.         Left eye: No discharge.      Conjunctiva/sclera: Conjunctivae normal.   Neck:      Trachea: No tracheal deviation.   Cardiovascular:      Rate and Rhythm: Normal rate and regular rhythm.      Heart sounds: Normal heart sounds. No murmur heard.    No gallop.   Pulmonary:      Effort: Pulmonary effort is normal. No respiratory distress.      Breath sounds: Normal breath sounds. No stridor. No wheezing or rales.   Abdominal:      General: Bowel sounds are normal. There is no distension.      Palpations: Abdomen is soft. There is no mass.      Tenderness: There is no abdominal tenderness. There is no guarding.   Musculoskeletal:         General: Tenderness (with movement and palpation in hips) present. No swelling or deformity. Normal range of motion.      Cervical back: Normal range of motion and neck supple.   Skin:     General: Skin is warm and dry.       Coloration: Skin is not pale.      Findings: No erythema or rash.   Neurological:      General: No focal deficit present.      Mental Status: She is alert.      Cranial Nerves: No cranial nerve deficit.      Motor: No abnormal muscle tone.   Psychiatric:         Mood and Affect: Mood normal.         Behavior: Behavior normal.         Thought Content: Thought content normal.         Judgment: Judgment normal.           Significant Labs: All pertinent labs within the past 24 hours have been reviewed.  BMP:   Recent Labs   Lab 04/12/22 2054   GLU 90      K 4.0      CO2 20*   BUN 19   CREATININE 1.9*   CALCIUM 8.7     CBC:   Recent Labs   Lab 04/12/22 2054   WBC 9.27   HGB 12.0   HCT 36.1*        CMP:   Recent Labs   Lab 04/12/22 2054      K 4.0      CO2 20*   GLU 90   BUN 19   CREATININE 1.9*   CALCIUM 8.7   ANIONGAP 11   EGFRNONAA 30*     Urine Culture: No results for input(s): LABURIN in the last 48 hours.  Urine Studies: No results for input(s): COLORU, APPEARANCEUA, PHUR, SPECGRAV, PROTEINUA, GLUCUA, KETONESU, BILIRUBINUA, OCCULTUA, NITRITE, UROBILINOGEN, LEUKOCYTESUR, RBCUA, WBCUA, BACTERIA, SQUAMEPITHEL, HYALINECASTS in the last 48 hours.    Invalid input(s): WRIGHTSUR    Significant Imaging: I have reviewed all pertinent imaging results/findings within the past 24 hours.  Imaging Results               CT Pelvis Without Contrast (Final result)  Result time 04/12/22 19:59:25      Final result by Randy Avila MD (04/12/22 19:59:25)                   Impression:      Vertical nondisplaced fracture of the right pubic ramus adjacent to the symphysis.    Contralateral left sacral kali are nondisplaced fracture of the contralateral pelvic ring.    No evidence of SI joint or pubic symphysis diastasis.    Negative hips.    This report was flagged in Epic as abnormal.      Electronically signed by: Randy Avila  Date:    04/12/2022  Time:    19:59               Narrative:     EXAMINATION:  CT PELVIS WITHOUT CONTRAST    CLINICAL HISTORY:  Pelvic fracture;    TECHNIQUE:  Axial CT images of the pelvis were obtained.    COMPARISON:  Plain film, same day    FINDINGS:  The cortical irregularity at the medial aspect of the pubic symphysis at the conjoined superior and inferior pubic ramus demonstrates a nondisplaced fracture.  There is no symphysis diastasis.  No extension into the inferior or superior ramus is evident.    SI joints appear intact.  A small nondisplaced contralateral left sacral kali are fracture is noted best seen on coronal image 120 of series 200..    Spondylosis in the lumbosacral junction is evident.    Suprapubic catheter is noted.  There is no evidence of pelvic mass or free fluid.  The bladder wall is thickened.  A large dystrophic calcification in the left side of the pelvis is noted.  Femoral heads and femoroacetabular joints appear maintained.                                       CT Lumbar Spine Without Contrast (Final result)  Result time 04/12/22 20:03:55      Final result by Randy Avila MD (04/12/22 20:03:55)                   Impression:      Lumbar spondylosis as described with no evidence of acute lumbar fracture.    Nondisplaced left S1-S2 sacral kali are fracture as described on CT of the pelvis.      Electronically signed by: Randy Avila  Date:    04/12/2022  Time:    20:03               Narrative:    EXAMINATION:  CT LUMBAR SPINE WITHOUT CONTRAST    CLINICAL HISTORY:  Low back pain, trauma;    TECHNIQUE:  Low-dose axial, sagittal and coronal reformations are obtained through the lumbar spine.  Contrast was not administered.    COMPARISON:  None.    FINDINGS:  Alignment of the lumbar spine demonstrates multilevel spondylosis.  Schmorl's node compression deformity the superior endplate of L1 is noted, age indeterminate but likely remote.  There is mild uncovering of the disc at L4-5 with anterolisthesis and disc vacuum phenomenon.    No acute  fracture or central canal stenosis is evident with multilevel spondylosis and facet arthropathy most severe at L4-5 and L5-S1.    The nondisplaced cortical fracture of the left sacral kali are segments at S 1 and S2 are noted with no SI joint diastasis.  There is no adjacent hematoma.  The surrounding retroperitoneal and pelvic soft tissue structures appear unremarkable with cortical scarring suggested in the left posterior kidney and prominent extrarenal pelvis, left greater than right.  The aorta is normal in caliber.                                       X-Ray Lumbar Spine Ap And Lateral (Final result)  Result time 04/12/22 18:40:12      Final result by Dione Trinidad MD (04/12/22 18:40:12)                   Impression:      Mild anterior wedging of L1 vertebral body, which may be due to remote fracture or normal variant.    Grade 1 anterolisthesis of L4 on L5.  If pain is out of proportion to the radiological findings, recommend CT or MRI when clinically appropriate.      Electronically signed by: Dione Trinidad  Date:    04/12/2022  Time:    18:40               Narrative:    EXAMINATION:  LUMBAR SPINE    CLINICAL HISTORY:  Low back pain.    TECHNIQUE:  AP, lateral, and coned lateral views of the lower lumbar spine were submitted.    COMPARISON:  CTA abdomen and pelvis 05/16/2018    FINDINGS:  There is mild dextroscoliosis of the lumbar spine. There is mild anterior wedging of the L1 vertebral body with prominent superior endplate spur.  Similar appearance is seen on the CT abdomen pelvis from 05/16/2018.  There is grade 1 anterolisthesis of L4 on L5 with intervertebral disc space narrowing.  Small marginal osteophytes are seen throughout.  Facet arthrosis is seen at L4/L5 and L5/S1.                                       X-Ray Hips Bilateral 2 View Incl AP Pelvis (Final result)  Result time 04/12/22 18:17:11      Final result by Dione Trinidad MD (04/12/22 18:17:11)                   Impression:     "  Cortical irregularity at the cephalad aspect of the right superior ramus.  Possibility of fracture should be considered.  If warranted, consider CT pelvis.      Electronically signed by: Dione Trinidad  Date:    04/12/2022  Time:    18:17               Narrative:    EXAMINATION:  TWO VIEWS OF BILATERAL HIPS    CLINICAL HISTORY:  Hip pain.    TECHNIQUE:  AP and lateral view of the both hips    COMPARISON:  None.    FINDINGS:  Two views of the both hips demonstrate cortical irregularity at the cephalad aspect of the right superior pubic ramus medially.  The femoral heads appear to be intact.  There is mild degenerative changes at the hip joints and sacral iliac joints.  Greater degenerative change is seen at the visualized lower lumbar spine.  Mild dextroscoliosis is noted.                                       Assessment/Plan:     * Pubic ramus fracture, right, closed, initial encounter  - Ms. Shannan Medrano presents due to pain with attempts at ambulation  Left pelvic ring fracture   - imaging shows right pubic ramus fracture and left pelvic ring fracture   - PRN pain meds  - Ortho consulted   - non weight bearing overnight       Pelvic ring fracture  - as above in "Pubic Ramus Fracture"       Down syndrome  - history noted     HLD (hyperlipidemia)  - continue atorvastatin 10 mg QD       CKD (chronic kidney disease) stage 3, GFR 30-59 ml/min  - Cr is 1.9 today, which is better than recent measures   - avoid nephrotoxins   - renally dose meds       Type 2 diabetes mellitus  - hold oral antidiabetic meds   - Diabetic diet   - SSI with accuchecks AC/HS        VTE Risk Mitigation (From admission, onward)    None             Angelique Christianson PA-C  Department of Hospital Medicine   [facility] - Emergency Dept  "

## 2022-04-13 NOTE — SUBJECTIVE & OBJECTIVE
Interval History: Patient is awake and alert this morning.  She is mostly nonverbal, but points to suprapubic area as pain.  Looks comfortable in bed without distress.    Review of Systems   Unable to perform ROS: Patient nonverbal   Constitutional:  Negative for chills and fever.   Respiratory:  Negative for shortness of breath.    Objective:     Vital Signs (Most Recent):  Temp: 97.7 °F (36.5 °C) (04/13/22 1200)  Pulse: 63 (04/13/22 1400)  Resp: 17 (04/13/22 1200)  BP: 104/63 (04/13/22 1200)  SpO2: 96 % (04/13/22 1200) Vital Signs (24h Range):  Temp:  [96.1 °F (35.6 °C)-98.8 °F (37.1 °C)] 97.7 °F (36.5 °C)  Pulse:  [51-79] 63  Resp:  [16-20] 17  SpO2:  [94 %-100 %] 96 %  BP: ()/(53-75) 104/63     Weight: 44 kg (97 lb)  Body mass index is 18.94 kg/m².    Intake/Output Summary (Last 24 hours) at 4/13/2022 1516  Last data filed at 4/13/2022 0600  Gross per 24 hour   Intake 120 ml   Output 700 ml   Net -580 ml      Physical Exam  Constitutional:       General: She is not in acute distress.     Appearance: She is normal weight. She is ill-appearing (chronically).   HENT:      Right Ear: External ear normal.      Left Ear: External ear normal.      Nose: Nose normal.      Mouth/Throat:      Mouth: Mucous membranes are moist.      Pharynx: No oropharyngeal exudate.   Eyes:      General: No scleral icterus.     Pupils: Pupils are equal, round, and reactive to light.   Cardiovascular:      Rate and Rhythm: Normal rate and regular rhythm.      Pulses: Normal pulses.      Heart sounds: Normal heart sounds.   Pulmonary:      Effort: Pulmonary effort is normal. No respiratory distress.      Breath sounds: Normal breath sounds. No wheezing or rales.   Abdominal:      General: Bowel sounds are normal. There is no distension.      Tenderness: There is no abdominal tenderness.   Musculoskeletal:         General: Normal range of motion.      Cervical back: Normal range of motion and neck supple.      Right lower leg: No  edema.      Left lower leg: No edema.   Skin:     General: Skin is warm and dry.   Neurological:      Mental Status: She is alert. Mental status is at baseline.   Psychiatric:         Mood and Affect: Mood normal.         Behavior: Behavior normal.       Significant Labs: All pertinent labs within the past 24 hours have been reviewed.    Significant Imaging: I have reviewed all pertinent imaging results/findings within the past 24 hours.

## 2022-04-13 NOTE — SUBJECTIVE & OBJECTIVE
Past Medical History:   Diagnosis Date    Down syndrome     Osteopenia        Past Surgical History:   Procedure Laterality Date    HERNIA REPAIR         Review of patient's allergies indicates:   Allergen Reactions    Caffeine        No current facility-administered medications on file prior to encounter.     Current Outpatient Medications on File Prior to Encounter   Medication Sig    apixaban (ELIQUIS) 5 mg Tab Take 5 mg by mouth 2 (two) times daily.    ascorbic acid, vitamin C, (VITAMIN C) 500 MG tablet Take 500 mg by mouth once daily.    folic acid (FOLVITE) 400 MCG tablet Take 400 mcg by mouth once daily.    levothyroxine (SYNTHROID) 50 MCG tablet Take 50 mcg by mouth before breakfast.    metFORMIN (GLUMETZA) 500 MG (MOD) 24 hr tablet Take 500 mg by mouth daily with breakfast.    multivitamin (THERAGRAN) per tablet Take 1 tablet by mouth once daily.    oxybutynin (DITROPAN-XL) 10 MG 24 hr tablet Take 10 mg by mouth once daily.    alendronate (FOSAMAX) 5 MG Tab Take by mouth before breakfast.    atorvastatin (LIPITOR) 10 MG tablet Take 10 mg by mouth once daily.    azelastine (ASTELIN) 137 mcg (0.1 %) nasal spray 1 spray by Nasal route 2 (two) times daily.    calcium-vitamin D3 500 mg(1,250mg) -200 unit per tablet Take 1 tablet by mouth 2 (two) times daily with meals.    dicyclomine (BENTYL) 10 MG capsule Take 10 mg by mouth 4 (four) times daily before meals and nightly.    guaifenesin 100 mg/5 ml (ROBITUSSIN) 100 mg/5 mL syrup Take 200 mg by mouth 3 (three) times daily as needed for Cough.    hydrocortisone 2.5 % cream Apply topically 2 (two) times daily.    ibuprofen (ADVIL,MOTRIN) 800 MG tablet Take 1 tablet (800 mg total) by mouth every 6 (six) hours as needed for Pain.    lactulose (CHRONULAC) 10 gram/15 mL solution Take by mouth 3 (three) times daily.    medroxyPROGESTERone (DEPO-PROVERA) 150 mg/mL injection Inject 1 mL (150 mg total) into the muscle every 3 (three) months.    potassium chloride SA  (K-DUR,KLOR-CON) 20 MEQ tablet Take 20 mEq by mouth once daily.    simethicone (MYLICON) 80 MG chewable tablet Take 80 mg by mouth every 6 (six) hours as needed for Flatulence.    white petrolatum-mineral oil (EUCERIN) Crea Apply topically as needed.     Family History    None       Tobacco Use    Smoking status: Never Smoker    Smokeless tobacco: Never Used   Substance and Sexual Activity    Alcohol use: No    Drug use: No    Sexual activity: Never     Review of Systems   Unable to perform ROS: Other (Patient answers yes to all questions.)   Objective:     Vital Signs (Most Recent):  Temp: 98.4 °F (36.9 °C) (04/12/22 2255)  Pulse: (!) 52 (04/12/22 2255)  Resp: 20 (04/12/22 2255)  BP: (!) 98/56 (04/12/22 2255)  SpO2: 97 % (04/12/22 2255)   Vital Signs (24h Range):  Temp:  [97.6 °F (36.4 °C)-98.4 °F (36.9 °C)] 98.4 °F (36.9 °C)  Pulse:  [52-77] 52  Resp:  [18-20] 20  SpO2:  [94 %-100 %] 97 %  BP: ()/(53-75) 98/56        There is no height or weight on file to calculate BMI.    Physical Exam  Vitals and nursing note reviewed.   Constitutional:       General: She is not in acute distress.     Appearance: She is well-developed and normal weight. She is not ill-appearing, toxic-appearing or diaphoretic.   HENT:      Head: Normocephalic and atraumatic.   Eyes:      General: No scleral icterus.        Right eye: No discharge.         Left eye: No discharge.      Conjunctiva/sclera: Conjunctivae normal.   Neck:      Trachea: No tracheal deviation.   Cardiovascular:      Rate and Rhythm: Normal rate and regular rhythm.      Heart sounds: Normal heart sounds. No murmur heard.    No gallop.   Pulmonary:      Effort: Pulmonary effort is normal. No respiratory distress.      Breath sounds: Normal breath sounds. No stridor. No wheezing or rales.   Abdominal:      General: Bowel sounds are normal. There is no distension.      Palpations: Abdomen is soft. There is no mass.      Tenderness: There is no abdominal tenderness.  There is no guarding.   Musculoskeletal:         General: Tenderness (with movement and palpation in hips) present. No swelling or deformity. Normal range of motion.      Cervical back: Normal range of motion and neck supple.   Skin:     General: Skin is warm and dry.      Coloration: Skin is not pale.      Findings: No erythema or rash.   Neurological:      General: No focal deficit present.      Mental Status: She is alert.      Cranial Nerves: No cranial nerve deficit.      Motor: No abnormal muscle tone.   Psychiatric:         Mood and Affect: Mood normal.         Behavior: Behavior normal.         Thought Content: Thought content normal.         Judgment: Judgment normal.           Significant Labs: All pertinent labs within the past 24 hours have been reviewed.  BMP:   Recent Labs   Lab 04/12/22 2054   GLU 90      K 4.0      CO2 20*   BUN 19   CREATININE 1.9*   CALCIUM 8.7     CBC:   Recent Labs   Lab 04/12/22 2054   WBC 9.27   HGB 12.0   HCT 36.1*        CMP:   Recent Labs   Lab 04/12/22 2054      K 4.0      CO2 20*   GLU 90   BUN 19   CREATININE 1.9*   CALCIUM 8.7   ANIONGAP 11   EGFRNONAA 30*     Urine Culture: No results for input(s): LABURIN in the last 48 hours.  Urine Studies: No results for input(s): COLORU, APPEARANCEUA, PHUR, SPECGRAV, PROTEINUA, GLUCUA, KETONESU, BILIRUBINUA, OCCULTUA, NITRITE, UROBILINOGEN, LEUKOCYTESUR, RBCUA, WBCUA, BACTERIA, SQUAMEPITHEL, HYALINECASTS in the last 48 hours.    Invalid input(s): WRIGHTSUR    Significant Imaging: I have reviewed all pertinent imaging results/findings within the past 24 hours.  Imaging Results               CT Pelvis Without Contrast (Final result)  Result time 04/12/22 19:59:25      Final result by Randy Avila MD (04/12/22 19:59:25)                   Impression:      Vertical nondisplaced fracture of the right pubic ramus adjacent to the symphysis.    Contralateral left sacral kali are nondisplaced fracture  of the contralateral pelvic ring.    No evidence of SI joint or pubic symphysis diastasis.    Negative hips.    This report was flagged in Epic as abnormal.      Electronically signed by: Randy Avila  Date:    04/12/2022  Time:    19:59               Narrative:    EXAMINATION:  CT PELVIS WITHOUT CONTRAST    CLINICAL HISTORY:  Pelvic fracture;    TECHNIQUE:  Axial CT images of the pelvis were obtained.    COMPARISON:  Plain film, same day    FINDINGS:  The cortical irregularity at the medial aspect of the pubic symphysis at the conjoined superior and inferior pubic ramus demonstrates a nondisplaced fracture.  There is no symphysis diastasis.  No extension into the inferior or superior ramus is evident.    SI joints appear intact.  A small nondisplaced contralateral left sacral kali are fracture is noted best seen on coronal image 120 of series 200..    Spondylosis in the lumbosacral junction is evident.    Suprapubic catheter is noted.  There is no evidence of pelvic mass or free fluid.  The bladder wall is thickened.  A large dystrophic calcification in the left side of the pelvis is noted.  Femoral heads and femoroacetabular joints appear maintained.                                       CT Lumbar Spine Without Contrast (Final result)  Result time 04/12/22 20:03:55      Final result by Randy Avila MD (04/12/22 20:03:55)                   Impression:      Lumbar spondylosis as described with no evidence of acute lumbar fracture.    Nondisplaced left S1-S2 sacral kali are fracture as described on CT of the pelvis.      Electronically signed by: Randy Avila  Date:    04/12/2022  Time:    20:03               Narrative:    EXAMINATION:  CT LUMBAR SPINE WITHOUT CONTRAST    CLINICAL HISTORY:  Low back pain, trauma;    TECHNIQUE:  Low-dose axial, sagittal and coronal reformations are obtained through the lumbar spine.  Contrast was not administered.    COMPARISON:  None.    FINDINGS:  Alignment of the lumbar  spine demonstrates multilevel spondylosis.  Schmorl's node compression deformity the superior endplate of L1 is noted, age indeterminate but likely remote.  There is mild uncovering of the disc at L4-5 with anterolisthesis and disc vacuum phenomenon.    No acute fracture or central canal stenosis is evident with multilevel spondylosis and facet arthropathy most severe at L4-5 and L5-S1.    The nondisplaced cortical fracture of the left sacral kali are segments at S 1 and S2 are noted with no SI joint diastasis.  There is no adjacent hematoma.  The surrounding retroperitoneal and pelvic soft tissue structures appear unremarkable with cortical scarring suggested in the left posterior kidney and prominent extrarenal pelvis, left greater than right.  The aorta is normal in caliber.                                       X-Ray Lumbar Spine Ap And Lateral (Final result)  Result time 04/12/22 18:40:12      Final result by Dione Trinidad MD (04/12/22 18:40:12)                   Impression:      Mild anterior wedging of L1 vertebral body, which may be due to remote fracture or normal variant.    Grade 1 anterolisthesis of L4 on L5.  If pain is out of proportion to the radiological findings, recommend CT or MRI when clinically appropriate.      Electronically signed by: Dione Trinidad  Date:    04/12/2022  Time:    18:40               Narrative:    EXAMINATION:  LUMBAR SPINE    CLINICAL HISTORY:  Low back pain.    TECHNIQUE:  AP, lateral, and coned lateral views of the lower lumbar spine were submitted.    COMPARISON:  CTA abdomen and pelvis 05/16/2018    FINDINGS:  There is mild dextroscoliosis of the lumbar spine. There is mild anterior wedging of the L1 vertebral body with prominent superior endplate spur.  Similar appearance is seen on the CT abdomen pelvis from 05/16/2018.  There is grade 1 anterolisthesis of L4 on L5 with intervertebral disc space narrowing.  Small marginal osteophytes are seen throughout.  Facet  arthrosis is seen at L4/L5 and L5/S1.                                       X-Ray Hips Bilateral 2 View Incl AP Pelvis (Final result)  Result time 04/12/22 18:17:11      Final result by Dione Trinidad MD (04/12/22 18:17:11)                   Impression:      Cortical irregularity at the cephalad aspect of the right superior ramus.  Possibility of fracture should be considered.  If warranted, consider CT pelvis.      Electronically signed by: Dione Trinidad  Date:    04/12/2022  Time:    18:17               Narrative:    EXAMINATION:  TWO VIEWS OF BILATERAL HIPS    CLINICAL HISTORY:  Hip pain.    TECHNIQUE:  AP and lateral view of the both hips    COMPARISON:  None.    FINDINGS:  Two views of the both hips demonstrate cortical irregularity at the cephalad aspect of the right superior pubic ramus medially.  The femoral heads appear to be intact.  There is mild degenerative changes at the hip joints and sacral iliac joints.  Greater degenerative change is seen at the visualized lower lumbar spine.  Mild dextroscoliosis is noted.

## 2022-04-13 NOTE — HPI
Ms. Shannan Medrano is a 51 y.o. female, with PMH of Down Syndrome, T2DM, HLD, CKD-3, Cardiomyopathy, urinary retention s/p suprapubic catheter placement, who presented to [facility] ED on 4/12/22 due to left hip & right buttocks pain after falling on Saturday. She further notes chronic lower abdominal pain. Per her caregiver's report, she fell down 4 steps. She states she has balance issues, but can walk, but has been unable to do so since her fall due to pain with bearing weight. She was evaluated in the ED with labs showing normal H&H, and Cr of 1.9. X-ray of the hips showed possible fracture of the right superior ramus. A CT of the pelvis showed a nondisplaced fracture of the right pubic ramus adjacent tot he symphysis. There was also a contralateral left sacral nondisplaced fracture of the pelvic ring. X-ray of the lumbar spine showed wedging of L1 vertebral body which may be remote or normal variant as well as Gr 1 anterolisthesis of L4 on L5. She was placed on OBS.

## 2022-04-13 NOTE — ASSESSMENT & PLAN NOTE
Patient presented after a recent fall with pain.  Her imaging showed right pubic ramus fracture and left pelvic ring fracture.  Ortho consulted and awaiting recommendations   -PRN pain meds  -Non weight bearing overnight   -DVT prophylaxis with SQH for now

## 2022-04-13 NOTE — PLAN OF CARE
CM spoke to pt's caregiver at the group home, Letha, [phone]. Pt with Down's, unable to answer questions.    Pt lives in a group home for disabled residents and will return there when discharged.  Facility will provide transportation home.    Itz requests light wt wheel chair for pt when discharged for transportation purposes.    Pt currently pending ortho consult and therapy recs.    Ms Monae requests BSD for any needed meds.    Cm to follow for plans and arrangements.   04/13/22 1411   Discharge Assessment   Assessment Type Discharge Planning Assessment   Confirmed/corrected address, phone number and insurance Yes   Confirmed Demographics Correct on Facesheet   Source of Information health care advocate   If unable to respond/provide information was family/caregiver contacted? Yes   Contact Name/Number letha, [phone]   When was your last doctors appointment?   (few months ago)   Communicated COMPA with patient/caregiver Date not available/Unable to determine   Reason For Admission fall, fx superior ramus, R   Lives With other (see comments)  (group home)   Facility Arrived From: group home residence   Do you expect to return to your current living situation? Yes   Do you have help at home or someone to help you manage your care at home? Yes   Who are your caregiver(s) and their phone number(s)? Christina pt's nurse at [facility], [phone]   Prior to hospitilization cognitive status: Unable to Assess   Current cognitive status: Unable to Assess   Equipment Currently Used at Home none   Readmission within 30 days? No   Patient currently being followed by outpatient case management? Unable to determine (comments)   Do you take prescription medications? Yes   Do you have prescription coverage? Yes   Coverage medicaid   Do you have any problems affording any of your prescribed medications? No   Is the patient taking medications as prescribed? yes   Who is going to help you get home at discharge? Letha  [phone], from Group home   Are you on dialysis? No   Do you take coumadin? No   Discharge Plan A Home   Discharge Plan B Home   DME Needed Upon Discharge  wheelchair   Discharge Plan discussed with: Caregiver   Name(s) and Number(s) Letha [phone]   Discharge Barriers Identified None       [facility] Med [facility] ([facility])  Initial Discharge Assessment       Primary Care Provider: Primary Doctor No    Admission Diagnosis: Fall [W19.XXXA]  Closed nondisplaced fracture of pelvis, unspecified part of pelvis, initial encounter [S32.9XXA]    Admission Date: 4/12/2022  Expected Discharge Date:     Discharge Barriers Identified: (P) None    Payor: MEDICAID / Plan: MEDICAID [place] / Product Type: Government /     Extended Emergency Contact Information  Primary Emergency Contact: Josh Fermin  Mobile Phone: [phone]  Relation: Sponsor  Preferred language: English   needed? No    Discharge Plan A: (P) Home  Discharge Plan B: (P) Home      [facility] [place] - [address]  [address]  [address]  [place] [address]  Phone: [phone] Fax: [phone]    [facility] [place] - [address]  [address]  [address]  [facility] [address]  Phone: [phone] Fax: [phone]    [id number] - [place], [place] - [address] AT [facility] [address]  [address]  [place] [address]  Phone: [phone] Fax: [phone]      Initial Assessment (most recent)       Adult Discharge Assessment - 04/13/22 1411          Discharge Assessment    Assessment Type Discharge Planning Assessment (P)      Confirmed/corrected address, phone number and insurance Yes (P)      Confirmed Demographics Correct on Facesheet (P)      Source of Information health care advocate (P)      If unable to respond/provide information was family/caregiver contacted? Yes (P)      Contact Name/Number letha [phone] (P)      When  was your last doctors appointment? -- (P)    few months ago    Communicated COMPA with patient/caregiver Date not available/Unable to determine (P)      Reason For Admission fall, fx superior ramus, R (P)      Lives With other (see comments) (P)    group home    Facility Arrived From: [facility] residence (P)      Do you expect to return to your current living situation? Yes (P)      Do you have help at home or someone to help you manage your care at home? Yes (P)      Who are your caregiver(s) and their phone number(s)? yaa Vasquez's nurse at [facility], [phone] (P)      Prior to hospitilization cognitive status: Unable to Assess (P)      Current cognitive status: Unable to Assess (P)      Equipment Currently Used at Home none (P)      Readmission within 30 days? No (P)      Patient currently being followed by outpatient case management? Unable to determine (comments) (P)      Do you take prescription medications? Yes (P)      Do you have prescription coverage? Yes (P)      Coverage medicaid (P)      Do you have any problems affording any of your prescribed medications? No (P)      Is the patient taking medications as prescribed? yes (P)      Who is going to help you get home at discharge? Letha, [phone], from [facility] (P)      Are you on dialysis? No (P)      Do you take coumadin? No (P)      Discharge Plan A Home (P)      Discharge Plan B Home (P)      DME Needed Upon Discharge  wheelchair (P)      Discharge Plan discussed with: Caregiver (P)      Name(s) and Number(s) Letha [phone] (P)      Discharge Barriers Identified None (P)

## 2022-04-14 LAB
ANION GAP SERPL CALC-SCNC: 10 MMOL/L (ref 8–16)
BASOPHILS # BLD AUTO: 0.06 K/UL (ref 0–0.2)
BASOPHILS NFR BLD: 0.7 % (ref 0–1.9)
BUN SERPL-MCNC: 24 MG/DL (ref 6–20)
CALCIUM SERPL-MCNC: 8.9 MG/DL (ref 8.7–10.5)
CHLORIDE SERPL-SCNC: 107 MMOL/L (ref 95–110)
CK SERPL-CCNC: 26 U/L (ref 20–180)
CO2 SERPL-SCNC: 24 MMOL/L (ref 23–29)
CREAT SERPL-MCNC: 1.8 MG/DL (ref 0.5–1.4)
DIFFERENTIAL METHOD: ABNORMAL
EOSINOPHIL # BLD AUTO: 0.1 K/UL (ref 0–0.5)
EOSINOPHIL NFR BLD: 0.8 % (ref 0–8)
ERYTHROCYTE [DISTWIDTH] IN BLOOD BY AUTOMATED COUNT: 12.7 % (ref 11.5–14.5)
EST. GFR  (AFRICAN AMERICAN): 37 ML/MIN/1.73 M^2
EST. GFR  (NON AFRICAN AMERICAN): 32 ML/MIN/1.73 M^2
GLUCOSE SERPL-MCNC: 89 MG/DL (ref 70–110)
HCT VFR BLD AUTO: 42.1 % (ref 37–48.5)
HGB BLD-MCNC: 14 G/DL (ref 12–16)
IMM GRANULOCYTES # BLD AUTO: 0.06 K/UL (ref 0–0.04)
IMM GRANULOCYTES NFR BLD AUTO: 0.7 % (ref 0–0.5)
LYMPHOCYTES # BLD AUTO: 2.1 K/UL (ref 1–4.8)
LYMPHOCYTES NFR BLD: 25 % (ref 18–48)
MCH RBC QN AUTO: 32.7 PG (ref 27–31)
MCHC RBC AUTO-ENTMCNC: 33.3 G/DL (ref 32–36)
MCV RBC AUTO: 98 FL (ref 82–98)
MONOCYTES # BLD AUTO: 0.5 K/UL (ref 0.3–1)
MONOCYTES NFR BLD: 5.9 % (ref 4–15)
NEUTROPHILS # BLD AUTO: 5.7 K/UL (ref 1.8–7.7)
NEUTROPHILS NFR BLD: 66.9 % (ref 38–73)
NRBC BLD-RTO: 0 /100 WBC
PLATELET # BLD AUTO: 343 K/UL (ref 150–450)
PMV BLD AUTO: 9.3 FL (ref 9.2–12.9)
POCT GLUCOSE: 122 MG/DL (ref 70–110)
POCT GLUCOSE: 123 MG/DL (ref 70–110)
POCT GLUCOSE: 92 MG/DL (ref 70–110)
POTASSIUM SERPL-SCNC: 4.3 MMOL/L (ref 3.5–5.1)
RBC # BLD AUTO: 4.28 M/UL (ref 4–5.4)
SODIUM SERPL-SCNC: 141 MMOL/L (ref 136–145)
WBC # BLD AUTO: 8.47 K/UL (ref 3.9–12.7)

## 2022-04-14 PROCEDURE — 99226 PR SUBSEQUENT OBSERVATION CARE,LEVEL III: ICD-10-PCS | Mod: ,,, | Performed by: INTERNAL MEDICINE

## 2022-04-14 PROCEDURE — 96372 THER/PROPH/DIAG INJ SC/IM: CPT | Performed by: INTERNAL MEDICINE

## 2022-04-14 PROCEDURE — 25000003 PHARM REV CODE 250: Performed by: EMERGENCY MEDICINE

## 2022-04-14 PROCEDURE — 85025 COMPLETE CBC W/AUTO DIFF WBC: CPT | Performed by: INTERNAL MEDICINE

## 2022-04-14 PROCEDURE — 97116 GAIT TRAINING THERAPY: CPT

## 2022-04-14 PROCEDURE — 36415 COLL VENOUS BLD VENIPUNCTURE: CPT | Performed by: INTERNAL MEDICINE

## 2022-04-14 PROCEDURE — 82550 ASSAY OF CK (CPK): CPT | Performed by: INTERNAL MEDICINE

## 2022-04-14 PROCEDURE — G0378 HOSPITAL OBSERVATION PER HR: HCPCS

## 2022-04-14 PROCEDURE — 80048 BASIC METABOLIC PNL TOTAL CA: CPT | Performed by: INTERNAL MEDICINE

## 2022-04-14 PROCEDURE — 97167 OT EVAL HIGH COMPLEX 60 MIN: CPT

## 2022-04-14 PROCEDURE — 99226 PR SUBSEQUENT OBSERVATION CARE,LEVEL III: CPT | Mod: ,,, | Performed by: INTERNAL MEDICINE

## 2022-04-14 PROCEDURE — 97162 PT EVAL MOD COMPLEX 30 MIN: CPT

## 2022-04-14 PROCEDURE — 25000003 PHARM REV CODE 250: Performed by: INTERNAL MEDICINE

## 2022-04-14 PROCEDURE — 63600175 PHARM REV CODE 636 W HCPCS: Performed by: INTERNAL MEDICINE

## 2022-04-14 RX ORDER — TRAMADOL HYDROCHLORIDE 50 MG/1
50 TABLET ORAL EVERY 6 HOURS PRN
Status: DISCONTINUED | OUTPATIENT
Start: 2022-04-14 | End: 2022-04-28 | Stop reason: HOSPADM

## 2022-04-14 RX ORDER — OXYCODONE HYDROCHLORIDE 5 MG/1
5 TABLET ORAL EVERY 6 HOURS PRN
Status: DISCONTINUED | OUTPATIENT
Start: 2022-04-14 | End: 2022-04-28 | Stop reason: HOSPADM

## 2022-04-14 RX ADMIN — FOLIC ACID 1 MG: 1 TABLET ORAL at 08:04

## 2022-04-14 RX ADMIN — HEPARIN SODIUM 5000 UNITS: 5000 INJECTION INTRAVENOUS; SUBCUTANEOUS at 01:04

## 2022-04-14 RX ADMIN — LEVOTHYROXINE SODIUM 50 MCG: 50 TABLET ORAL at 05:04

## 2022-04-14 RX ADMIN — ATORVASTATIN CALCIUM 10 MG: 10 TABLET, FILM COATED ORAL at 08:04

## 2022-04-14 RX ADMIN — Medication 1 TABLET: at 04:04

## 2022-04-14 RX ADMIN — OXYCODONE HYDROCHLORIDE AND ACETAMINOPHEN 500 MG: 500 TABLET ORAL at 08:04

## 2022-04-14 RX ADMIN — HEPARIN SODIUM 5000 UNITS: 5000 INJECTION INTRAVENOUS; SUBCUTANEOUS at 09:04

## 2022-04-14 RX ADMIN — THERA TABS 1 TABLET: TAB at 08:04

## 2022-04-14 RX ADMIN — Medication 6 MG: at 08:04

## 2022-04-14 RX ADMIN — OXYBUTYNIN CHLORIDE 10 MG: 5 TABLET, EXTENDED RELEASE ORAL at 08:04

## 2022-04-14 RX ADMIN — Medication 1 TABLET: at 08:04

## 2022-04-14 RX ADMIN — HEPARIN SODIUM 5000 UNITS: 5000 INJECTION INTRAVENOUS; SUBCUTANEOUS at 05:04

## 2022-04-14 NOTE — ASSESSMENT & PLAN NOTE
Cr is 1.9 on admission, which is better than recent measures.  Stable at 1.8 today.  - avoid nephrotoxins   - renally dose meds

## 2022-04-14 NOTE — PLAN OF CARE
Problem: Adult Inpatient Plan of Care  Goal: Plan of Care Review  Outcome: Ongoing, Progressing  Goal: Patient-Specific Goal (Individualized)  Outcome: Ongoing, Progressing  Goal: Absence of Hospital-Acquired Illness or Injury  Outcome: Ongoing, Progressing  Goal: Optimal Comfort and Wellbeing  Outcome: Ongoing, Progressing  Goal: Readiness for Transition of Care  Outcome: Ongoing, Progressing     Problem: Diabetes Comorbidity  Goal: Blood Glucose Level Within Targeted Range  Outcome: Ongoing, Progressing     Problem: Fall Injury Risk  Goal: Absence of Fall and Fall-Related Injury  Outcome: Ongoing, Progressing     Problem: Skin Injury Risk Increased  Goal: Skin Health and Integrity  Outcome: Ongoing, Progressing     Problem: Infection  Goal: Absence of Infection Signs and Symptoms  Outcome: Ongoing, Progressing

## 2022-04-14 NOTE — PT/OT/SLP EVAL
Physical Therapy Evaluation and Treatment    Patient Name:  Shannan Medrano   MRN:  [id number]    Recommendations:     Discharge Recommendations:  rehabilitation facility   Discharge Equipment Recommendations: bedside commode, walker, rolling, wheelchair (jr RW, light weigth WC with pressure relieving cushion)   Barriers to discharge: Inaccessible home and Decreased caregiver support    Assessment:     Shannan Medrano is a 51 y.o. female admitted with a medical diagnosis of Pelvic ring fracture s/p fall down stairs ( Pubic ramus fracture, right, closed, initial encounter and sacral ala fracture L). Pmhx significant for Down syndrome, DM-T2, CKD-3, cardiomyopathy, urinary retention s/p suprapubic catheter, osteopenia.  She presents with the following impairments/functional limitations:  weakness, impaired self care skills, impaired functional mobilty, gait instability, impaired balance, decreased lower extremity function, decreased safety awareness, pain, orthopedic precautions.    Patient evaluated by PT and goals established. Patient with h/o Down syndrome with minimal verbal communication. Performed gait training with RW, pt with significant pain in RLE with swing phase and with WB limiting gait to less than 5 ft. RN notified of increased pain with WB, now comfortable at rest. PT will continue to follow and progress as tolerated. Rec for dc to IRF - if dc to group home, patient will require light weight WC and RW and is at increased risk for loss of ambulatory status d/t level of pain and difficulty with ambulation at this time.    Prior to admission pt was independent with mobility and there is expectation of returning to prior level of function to maintain independence avoiding readmission. Pt is at high risk of unplanned readmission due to fall risk and inability ambulate functional distances d/t pain. The lower level of care cannot provide total interdisciplinary approach needed. Pt is able to tolerate 3 hours of  "daily therapy. Pt is pleasant and motivated to return to prior level of function.     Rehab Prognosis: Good; patient would benefit from acute skilled PT services to address these deficits and reach maximum level of function.    Recent Surgery: * No surgery found *      Plan:     During this hospitalization, patient to be seen 6 x/week to address the identified rehab impairments via gait training, therapeutic activities, therapeutic exercises, neuromuscular re-education and progress toward the following goals:    · Plan of Care Expires:  05/14/22    Subjective     Chief Complaint: "Full of food" - crying with WB  Patient/Family Comments/goals: None verbalized; Patient agreeable to evaluation.  Pain/Comfort:  · Pain Rating 1: 0/10  · Location - Side 1: Bilateral  · Location 1: hip  · Pain Addressed 1: Reposition, Distraction, Cessation of Activity, Nurse notified  · Pain Rating Post-Intervention 1: 7/10 (PAINAD with ambulation, pt crying with final steps)    Patients cultural, spiritual, Mosque conflicts given the current situation: no    Living Environment:  · Pt lives in a group home.  · Upon discharge, patient will have assistance from staff members, unclear level of assistance they are able to provide.  Prior level of function:  · Ambulation: Indep per EMR  · Falls: Fell down stairs PTA  · Equipment used at home: none.     Objective:     Communicated with CAMMY Olivera prior to session.  Patient found HOB elevated with peripheral IV, bed alarm  upon PT entry to room.    General Precautions: Standard, fall, contact   Orthopedic Precautions:LLE partial weight bearing ("progress to WBAT as pain improves")   Braces: N/A  Respiratory Status: Room air    Patient donned non slip socks and gait belt for OOB mobility.    Exams:  · Cognition:   · Patient is oriented to name (alerts to name).  · Pt follows approximately 25% of one step commands.  Follows multimodal cueing best.  · Mood: Pleasant, attempts verbal communication " but limited in articulation  · Safety Awareness: Impaired  · Musculoskeletal:  · BMI: 18.94  · Posture:  Forward head, rounded shoulders  · LE ROM/Strength:   · R ROM: WFL  · L ROM: WFL  · R Strength: At least 3/5 strength, deferred formal testing d/t impaired command following and pelvic fx  · L Strength: At least 3/5 strength, deferred formal testing d/t impaired command following and pelvic fx   · Neuromuscular:  · Sensation: JIN d/t impaired cognition  · Tone/Reflexes: Generalized hypotonia  · Balance:   · Static sitting: Good  · Static standing: Fair  · Dynamic standing: Poor  · Visual-vestibular: Wears glasses  · Integument: Visible skin intact  · Cardiopulmonary:  · Edema: No pitting edema noted    Functional Mobility:  · Bed Mobility:     · Supine to Sit: minimum assistance  · Transfers:     · Sit to Stand:  minimum assistance and of 2 persons with rolling walker  · Gait: x4 ft with RW and Wander x2 people.   · Significant difficulty with RLE swing and limited stance time of RLE (R more painful than L at this time despite Ortho precautions)  · Became tearful at midpoint of gait and assisted to the chair    Therapeutic Activities and Exercises:  ·  Gait:  · Demo use of RW prior to attempt  · Manual assistance for gait and stability during gait bout  PT educated patient re:   PT plan of care/role of PT  Safety with OOB mobility  Use of RW, ortho precuations  Discharge disposition    Pt without verbalized understanding       AM-PAC 6 CLICK MOBILITY  Total Score:14     Patient left up in chair with all lines intact, call button in reach, chair alarm on, RN notified and white board updated.    GOALS:   Multidisciplinary Problems     Physical Therapy Goals        Problem: Physical Therapy    Goal Priority Disciplines Outcome Goal Variances Interventions   Physical Therapy Goal     PT, PT/OT Ongoing, Progressing     Description: Goals to be met by: 5/14/22    Patient will increase functional independence with  mobility by performin. Supine<>sit with SBA without use of hospital bed features.   2. Sit<>stand with SBA with RW.  3. Gait x 50 feet with SBA with RW maintaining PWB of RLE.                   History:     Past Medical History:   Diagnosis Date    Down syndrome     Osteopenia        Past Surgical History:   Procedure Laterality Date    HERNIA REPAIR         Time Tracking:     PT Received On: 22  PT Start Time: 937     PT Stop Time: 953  PT Total Time (min): 16 min     Overlap with OT for portions of session due to complex nature of patient and for safety with mobility to decrease fall risk for patient and caregiver injury requiring two skilled therapists to provide interventions.     Billable Minutes: Evaluation 8 and Gait Training 8      2022

## 2022-04-14 NOTE — PLAN OF CARE
Discharge pending medical clearance. Therapy recommended rehab. SARAH Orantes discussed rehab with the patient. List given. Pending final choice. Patient choice needs to be signed.    Referrals sent to the following:  [facility]  [facility]   04/14/22 1652   Discharge Reassessment   Assessment Type Discharge Planning Reassessment   Communicated COMPA with patient/caregiver Date not available/Unable to determine   Discharge Plan A Rehab   Discharge Plan B Home with family;Home   Post-Acute Status   Coverage [place] Medicaid   Discharge Delays   (pending medical clearance)

## 2022-04-14 NOTE — PLAN OF CARE
Problem: Occupational Therapy  Goal: Occupational Therapy Goal  Description: Goals to be met by: 4/28/22    Patient will increase functional independence with ADLs by performing:    LE Dressing with Minimal Assistance.  Grooming while standing at sink with Stand-by Assistance.  Toileting from bedside commode with Contact Guard Assistance for hygiene and clothing management.   Toilet transfer to bedside commode with Contact Guard Assistance.    Outcome: Ongoing, Progressing     OT evaluation complete. Pt limited by cognitive deficits, Down syndrome at baseline, and minimally verbal but pleasant and participated well. Limited by pain in RLE and ambulates short distance to chair <5 feet. Pt is PWB LLE.     DC: rehabilitation facility  DME: RW, lightweight wheelchair, shower chair vs TTB pending home environment

## 2022-04-14 NOTE — ASSESSMENT & PLAN NOTE
Patient presented after a recent fall with pain.  Her imaging showed right pubic ramus fracture and left pelvic ring fracture.  Ortho consulted and patient non-surgical.  Recommended partial WB on LLE.  -PRN pain meds  -PT consulted  -DVT prophylaxis with SQH for now

## 2022-04-14 NOTE — PLAN OF CARE
CM met with PT to complete assessment       04/14/22 7278   Discharge Assessment   Assessment Type Discharge Planning Assessment   Confirmed/corrected address, phone number and insurance Yes   Confirmed Demographics Correct on Facesheet   Source of Information patient   Contact Name/Number [phone]   When was your last doctors appointment?   (No PCP)   Reason For Admission fall   Lives With alone   Facility Arrived From: home   Do you expect to return to your current living situation?   (PT plans to transfer to hospital in [place] to address malnutrition issues)   Prior to hospitilization cognitive status: Alert/Oriented   Current cognitive status: Alert/Oriented   Walking or Climbing Stairs Difficulty none   Dressing/Bathing Difficulty none   Equipment Currently Used at Home none   Readmission within 30 days? No   Patient currently being followed by outpatient case management? No   Do you currently have service(s) that help you manage your care at home? No   Do you take prescription medications? Yes   Do you have prescription coverage? Yes   Coverage BCBS   Do you have any problems affording any of your prescribed medications? No   Is the patient taking medications as prescribed? yes   Who is going to help you get home at discharge? Pts daughter, Anne   How do you get to doctors appointments? family or friend will provide   Are you on dialysis? No   Do you take coumadin? No   Discharge Plan A   (tbd)   Discharge Plan B   (Tbd)   DME Needed Upon Discharge    (tbd)   Discharge Plan discussed with: Patient;Adult children   Discharge Barriers Identified None

## 2022-04-14 NOTE — PROGRESS NOTES
[facility] Surg [facility] Medicine  Progress Note    Patient Name: Shannan Medrano  MRN: [id number]  Patient Class: OP- Observation   Admission Date: 4/12/2022  Length of Stay: 0 days  Attending Physician: Jermaine Maki MD  Primary Care Provider: Primary Doctor No        Subjective:     Principal Problem:Pubic ramus fracture, right, closed, initial encounter      HPI:  Ms. Shannan Medrano is a 51 y.o. female, with PMH of Down Syndrome, T2DM, HLD, CKD-3, Cardiomyopathy, urinary retention s/p suprapubic catheter placement, who presented to [facility] on 4/12/22 due to left hip & right buttocks pain after falling on Saturday. She further notes chronic lower abdominal pain. Per her caregiver's report, she fell down 4 steps. She states she has balance issues, but can walk, but has been unable to do so since her fall due to pain with bearing weight. She was evaluated in the ED with labs showing normal H&H, and Cr of 1.9. X-ray of the hips showed possible fracture of the right superior ramus. A CT of the pelvis showed a nondisplaced fracture of the right pubic ramus adjacent tot he symphysis. There was also a contralateral left sacral nondisplaced fracture of the pelvic ring. X-ray of the lumbar spine showed wedging of L1 vertebral body which may be remote or normal variant as well as Gr 1 anterolisthesis of L4 on L5. She was placed on OBS.       Overview/Hospital Course:  No notes on file    Interval History: Patient is awake and alert this morning.  She is mostly nonverbal, but points to suprapubic area as pain.  Looks comfortable in bed without distress.  No adverse events reported overnight.    Review of Systems   Unable to perform ROS: Patient nonverbal   Constitutional:  Negative for chills and fever.   Respiratory:  Negative for shortness of breath.    Objective:     Vital Signs (Most Recent):  Temp: 97.4 °F (36.3 °C) (04/14/22 1209)  Pulse: 84 (04/14/22 1209)  Resp: 18 (04/14/22 1209)  BP: (!) 102/51  (04/14/22 1209)  SpO2: 98 % (04/14/22 1209) Vital Signs (24h Range):  Temp:  [97.4 °F (36.3 °C)-98.7 °F (37.1 °C)] 97.4 °F (36.3 °C)  Pulse:  [] 84  Resp:  [16-18] 18  SpO2:  [96 %-99 %] 98 %  BP: (102-124)/(51-76) 102/51     Weight: 44 kg (97 lb)  Body mass index is 18.94 kg/m².    Intake/Output Summary (Last 24 hours) at 4/14/2022 1412  Last data filed at 4/14/2022 1200  Gross per 24 hour   Intake 900 ml   Output 2000 ml   Net -1100 ml        Physical Exam  Constitutional:       General: She is not in acute distress.     Appearance: She is normal weight. She is ill-appearing (chronically).   HENT:      Head: Normocephalic and atraumatic.      Right Ear: External ear normal.      Left Ear: External ear normal.      Nose: Nose normal.      Mouth/Throat:      Mouth: Mucous membranes are moist.      Pharynx: No oropharyngeal exudate.   Eyes:      General: No scleral icterus.     Pupils: Pupils are equal, round, and reactive to light.   Cardiovascular:      Rate and Rhythm: Normal rate and regular rhythm.      Pulses: Normal pulses.      Heart sounds: Normal heart sounds.   Pulmonary:      Effort: Pulmonary effort is normal. No respiratory distress.      Breath sounds: Normal breath sounds. No wheezing or rales.   Abdominal:      General: Bowel sounds are normal. There is no distension.      Tenderness: There is no abdominal tenderness.   Musculoskeletal:         General: Normal range of motion.      Cervical back: Normal range of motion and neck supple.      Right lower leg: No edema.      Left lower leg: No edema.   Skin:     General: Skin is warm and dry.   Neurological:      Mental Status: She is alert. Mental status is at baseline.   Psychiatric:         Mood and Affect: Mood normal.         Behavior: Behavior normal.       Significant Labs: All pertinent labs within the past 24 hours have been reviewed.    Significant Imaging: I have reviewed all pertinent imaging results/findings within the past 24  hours.      Assessment/Plan:      Pelvic ring fracture and Pubic Ramus Fracture  Patient presented after a recent fall with pain.  Her imaging showed right pubic ramus fracture and left pelvic ring fracture.  Ortho consulted and patient non-surgical.  Recommended partial WB on LLE.  -PRN pain meds  -PT consulted  -DVT prophylaxis with SQH for now      HLD (hyperlipidemia)  -Continue Atorvastatin 10 mg QD       CKD (chronic kidney disease) stage 3, GFR 30-59 ml/min  Cr is 1.9 on admission, which is better than recent measures.  Stable at 1.8 today.  - avoid nephrotoxins   - renally dose meds       Type 2 diabetes mellitus  Home Meds:  Metformin 500mg  A1C 5.1 on admission  - Diabetic diet   - SSI with accuchecks AC/HS        Down syndrome  - history noted       VTE Risk Mitigation (From admission, onward)         Ordered     heparin (porcine) injection 5,000 Units  Every 8 hours         04/13/22 1523     IP VTE LOW RISK PATIENT  Once         04/12/22 2327     Place sequential compression device  Until discontinued         04/12/22 2327                Discharge Planning   COMPA:      Code Status: Full Code   Is the patient medically ready for discharge?:     Reason for patient still in hospital (select all that apply): Patient trending condition, Treatment and Consult recommendations  Discharge Plan A: Home                  Jermaine Maki MD  Department of Hospital Medicine   [employer] ([facility])

## 2022-04-14 NOTE — PT/OT/SLP EVAL
"Occupational Therapy   Evaluation    Name: Shannan Medrano  MRN: [id number]  Admitting Diagnosis:  Pubic ramus fracture, right, closed, initial encounter  Recent Surgery: * No surgery found *      Recommendations:     Discharge Recommendations: rehabilitation facility  Discharge Equipment Recommendations:  bedside commode, walker, rolling, wheelchair, shower chair (deng RW, lightweight wheelchair with pressure cushion)  Barriers to discharge:  Other (Comment) (current functional level, fall risk)    Assessment:     Shannan Medrano is a 51 y.o. female with a medical diagnosis of Pubic ramus fracture, right, closed, initial encounter.  She presents with  weakness, impaired endurance, impaired self care skills, impaired functional mobilty, gait instability, impaired balance, impaired cognition, decreased coordination, decreased lower extremity function, decreased safety awareness, pain, orthopedic precautions.      Rehab Prognosis: Good; patient would benefit from acute skilled OT services to address these deficits and reach maximum level of function.     Pt very pleasant and participated well but limited by pain in RLE as well as cognition for comprehension of weight bearing status of LLE. Pt limited by pain to short distances at this time. Pt poor historian and unable to report PLOF but anticipate ambulatory. Pt would benefit from intensive rehab 3 hours/day to return to safety and independence with ADLs.       Plan:     Patient to be seen 4 x/week to address the above listed problems via self-care/home management, therapeutic activities, therapeutic exercises  · Plan of Care Expires: 05/14/22  · Plan of Care Reviewed with: patient    Subjective     Chief Complaint: "I'm full of food"  Patient/Family Comments/goals: pain management     Occupational Profile:  Living Environment: per chart, pt lives in a group home  Previous level of function: patient poor historian but has a caregiver, per chart  Roles and Routines: " "disabled  Equipment Used at Home:  none  Assistance upon Discharge: caregiver, needs clarification     Pain/Comfort:  Pain Rating 1: 0/10  Location - Side 1: Bilateral  Location 1: hip  Pain Addressed 1: Reposition, Distraction, Cessation of Activity, Nurse notified  Pain Rating Post-Intervention 1: 7/10 (increased with ambulation, pt tearful during transfer to chair)  Pain Addressed 2: Reposition, Distraction, Cessation of Activity, Nurse notified    Patients cultural, spiritual, Faith conflicts given the current situation: no    Objective:     Communicated with: RN prior to session.  Patient found HOB elevated with peripheral IV, bed alarm upon OT entry to room.    General Precautions: Standard, contact, fall   Orthopedic Precautions:LLE partial weight bearing (progress to WBAT as pain improves, per chart)   Braces: N/A  Respiratory Status: Room air    Occupational Performance:    Bed Mobility:    · Supine > sitting: min A with cues for sequencing     Functional Mobility/Transfers:  · Sit <> stand: Min A x2 with RW. Pt would benefit from jr. RW 2/2 stature.   · Functional Mobility:Pt required min A x2 and RW to take steps to chair, <5 feet. Pt became tearful with functional ambulation 2/2 pain in RLE. Pt positioned in chair for comfort and appeared to be pain free at rest.     Activities of Daily Living:  · LBD: Max A to don socks in supine     Cognitive/Visual Perceptual:  Cognition: baseline deficits 2/2 Down syndrome. Pt minimally verbal stating "I'm full of food" during session 2/2 completing breakfast. Pt attempted to make further statements but unable to discern phrases 2/2 communication barriers related to speech.   Vision: Glasses as all times    Physical Exam:  BUE ROM/MMT WFL for functional mobility and transfers     AMPAC 6 Click ADL:  AMPAC Total Score: 15    Treatment & Education:  OT role, plan of care, progression of goals, weight bearing precautions, transfer retraining, ADL retraining, safety " precautions, use of AD   Education:    Patient left up in chair with all lines intact, call button in reach, chair alarm on and RN notified    GOALS:   Multidisciplinary Problems     Occupational Therapy Goals        Problem: Occupational Therapy    Goal Priority Disciplines Outcome Interventions   Occupational Therapy Goal     OT, PT/OT Ongoing, Progressing    Description: Goals to be met by: 4/28/22    Patient will increase functional independence with ADLs by performing:    LE Dressing with Minimal Assistance.  Grooming while standing at sink with Stand-by Assistance.  Toileting from bedside commode with Contact Guard Assistance for hygiene and clothing management.   Toilet transfer to bedside commode with Contact Guard Assistance.                     History:     Past Medical History:   Diagnosis Date    Down syndrome     Osteopenia          Past Surgical History:   Procedure Laterality Date    HERNIA REPAIR         Time Tracking:     OT Date of Treatment: 04/14/22  OT Start Time: 0937  OT Stop Time: 0954  OT Total Time (min): 17 min    Billable Minutes:Evaluation 17 minutes     Co evaluation performed due to patient's multiple deficits requiring two skilled therapists to safety assess patient's ability to complete ADLs and functional mobility in addition to accommodating for patient's activity tolerance while in acute care setting.       4/14/2022

## 2022-04-14 NOTE — SUBJECTIVE & OBJECTIVE
Interval History: Patient is awake and alert this morning.  She is mostly nonverbal, but points to suprapubic area as pain.  Looks comfortable in bed without distress.  No adverse events reported overnight.    Review of Systems   Unable to perform ROS: Patient nonverbal   Constitutional:  Negative for chills and fever.   Respiratory:  Negative for shortness of breath.    Objective:     Vital Signs (Most Recent):  Temp: 97.4 °F (36.3 °C) (04/14/22 1209)  Pulse: 84 (04/14/22 1209)  Resp: 18 (04/14/22 1209)  BP: (!) 102/51 (04/14/22 1209)  SpO2: 98 % (04/14/22 1209) Vital Signs (24h Range):  Temp:  [97.4 °F (36.3 °C)-98.7 °F (37.1 °C)] 97.4 °F (36.3 °C)  Pulse:  [] 84  Resp:  [16-18] 18  SpO2:  [96 %-99 %] 98 %  BP: (102-124)/(51-76) 102/51     Weight: 44 kg (97 lb)  Body mass index is 18.94 kg/m².    Intake/Output Summary (Last 24 hours) at 4/14/2022 1412  Last data filed at 4/14/2022 1200  Gross per 24 hour   Intake 900 ml   Output 2000 ml   Net -1100 ml        Physical Exam  Constitutional:       General: She is not in acute distress.     Appearance: She is normal weight. She is ill-appearing (chronically).   HENT:      Head: Normocephalic and atraumatic.      Right Ear: External ear normal.      Left Ear: External ear normal.      Nose: Nose normal.      Mouth/Throat:      Mouth: Mucous membranes are moist.      Pharynx: No oropharyngeal exudate.   Eyes:      General: No scleral icterus.     Pupils: Pupils are equal, round, and reactive to light.   Cardiovascular:      Rate and Rhythm: Normal rate and regular rhythm.      Pulses: Normal pulses.      Heart sounds: Normal heart sounds.   Pulmonary:      Effort: Pulmonary effort is normal. No respiratory distress.      Breath sounds: Normal breath sounds. No wheezing or rales.   Abdominal:      General: Bowel sounds are normal. There is no distension.      Tenderness: There is no abdominal tenderness.   Musculoskeletal:         General: Normal range of motion.       Cervical back: Normal range of motion and neck supple.      Right lower leg: No edema.      Left lower leg: No edema.   Skin:     General: Skin is warm and dry.   Neurological:      Mental Status: She is alert. Mental status is at baseline.   Psychiatric:         Mood and Affect: Mood normal.         Behavior: Behavior normal.       Significant Labs: All pertinent labs within the past 24 hours have been reviewed.    Significant Imaging: I have reviewed all pertinent imaging results/findings within the past 24 hours.

## 2022-04-14 NOTE — PLAN OF CARE
Problem: Physical Therapy  Goal: Physical Therapy Goal  Description: Goals to be met by: 22    Patient will increase functional independence with mobility by performin. Supine<>sit with SBA without use of hospital bed features.   2. Sit<>stand with SBA with RW.  3. Gait x 50 feet with SBA with RW maintaining PWB of RLE.  Outcome: Ongoing, Progressing     Patient evaluated by PT and goals established. Patient with h/o Down syndrome with minimal verbal communication. Performed gait training with RW, pt with significant pain in RLE with swing phase and with WB limiting gait to less than 5 ft. RN notified of increased pain with WB, now comfortable at rest. PT will continue to follow and progress as tolerated. Rec for dc to IRF - if dc to group home, patient will require light weight WC and RW and is at increased risk for loss of ambulatory status d/t level of pain and difficulty with ambulation at this time. Please see progress note for detailed plan of care and recommendations.

## 2022-04-15 PROCEDURE — 63600175 PHARM REV CODE 636 W HCPCS: Performed by: INTERNAL MEDICINE

## 2022-04-15 PROCEDURE — 25000003 PHARM REV CODE 250: Performed by: INTERNAL MEDICINE

## 2022-04-15 PROCEDURE — 97530 THERAPEUTIC ACTIVITIES: CPT | Mod: CQ

## 2022-04-15 PROCEDURE — G0378 HOSPITAL OBSERVATION PER HR: HCPCS

## 2022-04-15 PROCEDURE — 97535 SELF CARE MNGMENT TRAINING: CPT

## 2022-04-15 PROCEDURE — 96372 THER/PROPH/DIAG INJ SC/IM: CPT | Performed by: INTERNAL MEDICINE

## 2022-04-15 PROCEDURE — 99226 PR SUBSEQUENT OBSERVATION CARE,LEVEL III: CPT | Mod: ,,, | Performed by: INTERNAL MEDICINE

## 2022-04-15 PROCEDURE — 99226 PR SUBSEQUENT OBSERVATION CARE,LEVEL III: ICD-10-PCS | Mod: ,,, | Performed by: INTERNAL MEDICINE

## 2022-04-15 RX ADMIN — FOLIC ACID 1 MG: 1 TABLET ORAL at 09:04

## 2022-04-15 RX ADMIN — LEVOTHYROXINE SODIUM 50 MCG: 50 TABLET ORAL at 05:04

## 2022-04-15 RX ADMIN — Medication 1 TABLET: at 06:04

## 2022-04-15 RX ADMIN — ATORVASTATIN CALCIUM 10 MG: 10 TABLET, FILM COATED ORAL at 09:04

## 2022-04-15 RX ADMIN — THERA TABS 1 TABLET: TAB at 09:04

## 2022-04-15 RX ADMIN — HEPARIN SODIUM 5000 UNITS: 5000 INJECTION INTRAVENOUS; SUBCUTANEOUS at 11:04

## 2022-04-15 RX ADMIN — OXYBUTYNIN CHLORIDE 10 MG: 5 TABLET, EXTENDED RELEASE ORAL at 09:04

## 2022-04-15 RX ADMIN — HEPARIN SODIUM 5000 UNITS: 5000 INJECTION INTRAVENOUS; SUBCUTANEOUS at 02:04

## 2022-04-15 RX ADMIN — OXYCODONE HYDROCHLORIDE AND ACETAMINOPHEN 500 MG: 500 TABLET ORAL at 09:04

## 2022-04-15 RX ADMIN — HEPARIN SODIUM 5000 UNITS: 5000 INJECTION INTRAVENOUS; SUBCUTANEOUS at 05:04

## 2022-04-15 RX ADMIN — Medication 1 TABLET: at 09:04

## 2022-04-15 RX ADMIN — ACETAMINOPHEN 650 MG: 325 TABLET, FILM COATED ORAL at 05:04

## 2022-04-15 NOTE — PT/OT/SLP PROGRESS
"Physical Therapy Treatment    Patient Name:  Shannan Medrano   MRN:  [id number]    Recommendations:     Discharge Recommendations:  rehabilitation facility   Discharge Equipment Recommendations: bedside commode, walker, rolling, wheelchair (jr CHAVES, light weigth WC with pressure relieving cushion)       Assessment:     Shannan Medrano is a 51 y.o. female admitted with a medical diagnosis of Pubic ramus fracture, right, closed, initial encounter.  She presents with the following impairments/functional limitations:  weakness, impaired endurance, impaired self care skills, impaired functional mobilty, gait instability, impaired balance, decreased lower extremity function, decreased safety awareness, pain, decreased ROM, orthopedic precautions .    Rehab Prognosis: Good; patient would benefit from acute skilled PT services to address these deficits and reach maximum level of function.    Recent Surgery: * No surgery found *      Plan:     During this hospitalization, patient to be seen 6 x/week to address the identified rehab impairments via gait training, therapeutic activities, therapeutic exercises, neuromuscular re-education and progress toward the following goals:    · Plan of Care Expires:  05/14/22    Subjective     Chief Complaint: pt stating "hurt" with bed mobility  Patient/Family Comments/goals: pleasant and eager to get OOB  Pain/Comfort:  · Pain Rating 1:  (did not rate but did grimmace with LE mobility)  · Location - Side 1: Bilateral  · Location 1: hip  · Pain Addressed 1: Reposition, Distraction, Cessation of Activity      Objective:     Communicated with nurse Busby prior to session.  Patient found HOB elevated with bed alarm, peripheral IV, tobar catheter upon PT entry to room.     General Precautions: Standard, fall, contact   Orthopedic Precautions:LLE partial weight bearing (progress to WBAT as pain improves)   Braces: N/A  Respiratory Status: Room air     Functional Mobility:  · Bed Mobility:   "   · Scooting: moderate assistance to EOB  · Supine to Sit: moderate assistance    · Transfers:     · Sit to Stand:  minimum assistance with no AD  · Bed to Chair: minimum assistance with  no AD  using  Stand Pivot     · Gait: 4 ft to beside chair with min A, no AD      AM-PAC 6 CLICK MOBILITY  Turning over in bed (including adjusting bedclothes, sheets and blankets)?: 3  Sitting down on and standing up from a chair with arms (e.g., wheelchair, bedside commode, etc.): 3  Moving from lying on back to sitting on the side of the bed?: 3  Moving to and from a bed to a chair (including a wheelchair)?: 3  Need to walk in hospital room?: 2  Climbing 3-5 steps with a railing?: 1  Basic Mobility Total Score: 15         Patient left up in chair with all lines intact, call button in reach, chair alarm on and nurse Kehinde notified..    GOALS:   Multidisciplinary Problems     Physical Therapy Goals        Problem: Physical Therapy    Goal Priority Disciplines Outcome Goal Variances Interventions   Physical Therapy Goal     PT, PT/OT Ongoing, Progressing     Description: Goals to be met by: 22    Patient will increase functional independence with mobility by performin. Supine<>sit with SBA without use of hospital bed features.   2. Sit<>stand with SBA with RW.  3. Gait x 50 feet with SBA with RW maintaining PWB of RLE.                   Time Tracking:     PT Received On: 04/15/22  PT Start Time: 0950     PT Stop Time: 1003  PT Total Time (min): 13 min     Billable Minutes: Therapeutic Activity 13    Treatment Type: Treatment  PT/PTA: PTA     PTA Visit Number: 1     04/15/2022

## 2022-04-15 NOTE — ASSESSMENT & PLAN NOTE
Home Meds:  Metformin 500mg  A1C 5.1 on admission  Glucose well controlled  - Diabetic diet   - will discontinues SSI with accuchecks AC/HS

## 2022-04-15 NOTE — ASSESSMENT & PLAN NOTE
Cr is 1.9 on admission, which is better than recent measures.  Stable at 1.8 yesterday.  - avoid nephrotoxins   - renally dose meds

## 2022-04-15 NOTE — SUBJECTIVE & OBJECTIVE
Interval History: Patient is awake and alert this morning.  She is mostly nonverbal, but points to suprapubic area as pain.  Looks comfortable in bed without distress.  No adverse events reported overnight.  Stable.    Review of Systems   Unable to perform ROS: Patient nonverbal   Constitutional:  Negative for chills and fever.   Respiratory:  Negative for shortness of breath.    Objective:     Vital Signs (Most Recent):  Temp: 98.9 °F (37.2 °C) (04/15/22 1202)  Pulse: 84 (04/15/22 1202)  Resp: 18 (04/15/22 1202)  BP: 108/60 (04/15/22 1202)  SpO2: 97 % (04/15/22 1202) Vital Signs (24h Range):  Temp:  [97 °F (36.1 °C)-98.9 °F (37.2 °C)] 98.9 °F (37.2 °C)  Pulse:  [63-97] 84  Resp:  [17-18] 18  SpO2:  [96 %-100 %] 97 %  BP: ()/(54-71) 108/60     Weight: 44 kg (97 lb)  Body mass index is 18.94 kg/m².    Intake/Output Summary (Last 24 hours) at 4/15/2022 1414  Last data filed at 4/15/2022 1200  Gross per 24 hour   Intake 1440 ml   Output 1350 ml   Net 90 ml        Physical Exam  Constitutional:       General: She is not in acute distress.     Appearance: She is normal weight. She is ill-appearing (chronically).   HENT:      Head: Normocephalic and atraumatic.      Right Ear: External ear normal.      Left Ear: External ear normal.      Nose: Nose normal.      Mouth/Throat:      Mouth: Mucous membranes are moist.      Pharynx: No oropharyngeal exudate.   Eyes:      General: No scleral icterus.     Pupils: Pupils are equal, round, and reactive to light.   Cardiovascular:      Rate and Rhythm: Normal rate and regular rhythm.      Pulses: Normal pulses.      Heart sounds: Normal heart sounds.   Pulmonary:      Effort: Pulmonary effort is normal. No respiratory distress.      Breath sounds: Normal breath sounds. No wheezing or rales.   Abdominal:      General: Bowel sounds are normal. There is no distension.      Tenderness: There is no abdominal tenderness.   Musculoskeletal:         General: Normal range of motion.       Cervical back: Normal range of motion and neck supple.      Right lower leg: No edema.      Left lower leg: No edema.   Skin:     General: Skin is warm and dry.   Neurological:      Mental Status: She is alert. Mental status is at baseline.   Psychiatric:         Mood and Affect: Mood normal.         Behavior: Behavior normal.       Significant Labs: All pertinent labs within the past 24 hours have been reviewed.    Significant Imaging: I have reviewed all pertinent imaging results/findings within the past 24 hours.

## 2022-04-15 NOTE — ASSESSMENT & PLAN NOTE
Patient presented after a recent fall with pain.  Her imaging showed right pubic ramus fracture and left pelvic ring fracture.  Ortho consulted and patient non-surgical.  Recommended partial WB on LLE.  -PRN pain meds  -PT consulted - recommends rehab  -DVT prophylaxis with SQH for now

## 2022-04-15 NOTE — PT/OT/SLP PROGRESS
Occupational Therapy   Treatment    Name: Shannan Medrano  MRN: [id number]  Admitting Diagnosis:  Pubic ramus fracture, right, closed, initial encounter       Recommendations:     Discharge Recommendations: rehabilitation facility  Discharge Equipment Recommendations:  bedside commode, walker, rolling, wheelchair, shower chair (deng RW, lightweight wheelchair with pressure cushion)  Barriers to discharge:       Assessment:     Shannan Medrano is a 51 y.o. female with a medical diagnosis of Pubic ramus fracture, right, closed, initial encounter.  She presents with weakness, self care deficit, impaired cognition and communication (DS). Total A for chair to bed transfer. Mod A with scooting/bridging in bed, STA for grooming (washing face).     Rehab Prognosis:  Good; patient would benefit from acute skilled OT services to address these deficits and reach maximum level of function.       Plan:     Patient to be seen 4 x/week to address the above listed problems via self-care/home management, therapeutic activities, therapeutic exercises  · Plan of Care Expires: 05/14/22  · Plan of Care Reviewed with: patient    Subjective     Pain/Comfort:  · Pain Rating 1: 0/10    Objective:     Communicated with: Kehinde prior to session.  Patient found up in chair eating her lunch with tobar catheter upon OT entry to room.    General Precautions: Standard, contact   Orthopedic Precautions:LLE partial weight bearing (progress to WBAT as pain improves, per chart)   Braces: N/A  Respiratory Status: Room air     Occupational Performance:     Bed Mobility:    · Patient completed Scooting/Bridging with moderate assistance     Functional Mobility/Transfers:  · Patient completed Bed <> Chair Transfer using total A technique with total assistance with no assistive device  ·     Activities of Daily Living:  · Feeding:  independence lunch   · Grooming: minimum assistance washing face      [facility] 6 Click ADL: 14    Treatment & Education:  - max  verbal and tactile cues for chair to bed transfer  - education on call button   - lifting BUE x 10 reps   - verbal and visual cue for scooting in chair   - fasteners   - set up assistance with removing lid on icecream; utensil use     Patient left HOB elevated with all lines intact and call button in reachEducation:      GOALS:   Multidisciplinary Problems     Occupational Therapy Goals        Problem: Occupational Therapy    Goal Priority Disciplines Outcome Interventions   Occupational Therapy Goal     OT, PT/OT Ongoing, Progressing    Description: Goals to be met by: 4/28/22    Patient will increase functional independence with ADLs by performing:    LE Dressing with Minimal Assistance.  Grooming while standing at sink with Stand-by Assistance.  Toileting from bedside commode with Contact Guard Assistance for hygiene and clothing management.   Toilet transfer to bedside commode with Contact Guard Assistance.                     Time Tracking:     OT Date of Treatment:  4/15/22  OT Start Time:  1257  OT Stop Time:  1326  OT Total Time (min):  26 min    Billable Minutes:Self Care/Home Management 26 minutes    OT/MYNOR: OT          4/15/2022

## 2022-04-15 NOTE — PLAN OF CARE
Problem: Occupational Therapy  Goal: Occupational Therapy Goal  Description: Goals to be met by: 4/28/22    Patient will increase functional independence with ADLs by performing:    LE Dressing with Minimal Assistance.  Grooming while standing at sink with Stand-by Assistance.  Toileting from bedside commode with Contact Guard Assistance for hygiene and clothing management.   Toilet transfer to bedside commode with Contact Guard Assistance.    Outcome: Ongoing, Progressing

## 2022-04-15 NOTE — PROGRESS NOTES
[facility] Surg [facility] Medicine  Progress Note    Patient Name: Shannan Medrano  MRN: [id number]  Patient Class: OP- Observation   Admission Date: 4/12/2022  Length of Stay: 0 days  Attending Physician: Jermaine Maki MD  Primary Care Provider: Primary Doctor No        Subjective:     Principal Problem:Pubic ramus fracture, right, closed, initial encounter      HPI:  Ms. Shannan Medrano is a 51 y.o. female, with PMH of Down Syndrome, T2DM, HLD, CKD-3, Cardiomyopathy, urinary retention s/p suprapubic catheter placement, who [place] [facility] [place]12/22 due to left hip & right buttocks pain after falling on Saturday. She further notes chronic lower abdominal pain. Per her caregiver's report, she fell down 4 steps. She states she has balance issues, but can walk, but has been unable to do so since her fall due to pain with bearing weight. She was evaluated in the ED with labs showing normal H&H, and Cr of 1.9. X-ray of the hips showed possible fracture of the right superior ramus. A CT of the pelvis showed a nondisplaced fracture of the right pubic ramus adjacent tot he symphysis. There was also a contralateral left sacral nondisplaced fracture of the pelvic ring. X-ray of the lumbar spine showed wedging of L1 vertebral body which may be remote or normal variant as well as Gr 1 anterolisthesis of L4 on L5. She was placed on OBS.       Overview/Hospital Course:  No notes on file    Interval History: Patient is awake and alert this morning.  She is mostly nonverbal, but points to suprapubic area as pain.  Looks comfortable in bed without distress.  No adverse events reported overnight.  Stable.    Review of Systems   Unable to perform ROS: Patient nonverbal   Constitutional:  Negative for chills and fever.   Respiratory:  Negative for shortness of breath.    Objective:     Vital Signs (Most Recent):  Temp: 98.9 °F (37.2 °C) (04/15/22 1202)  Pulse: 84 (04/15/22 1202)  Resp: 18 (04/15/22 1202)  BP:  108/60 (04/15/22 1202)  SpO2: 97 % (04/15/22 1202) Vital Signs (24h Range):  Temp:  [97 °F (36.1 °C)-98.9 °F (37.2 °C)] 98.9 °F (37.2 °C)  Pulse:  [63-97] 84  Resp:  [17-18] 18  SpO2:  [96 %-100 %] 97 %  BP: ()/(54-71) 108/60     Weight: 44 kg (97 lb)  Body mass index is 18.94 kg/m².    Intake/Output Summary (Last 24 hours) at 4/15/2022 1414  Last data filed at 4/15/2022 1200  Gross per 24 hour   Intake 1440 ml   Output 1350 ml   Net 90 ml        Physical Exam  Constitutional:       General: She is not in acute distress.     Appearance: She is normal weight. She is ill-appearing (chronically).   HENT:      Head: Normocephalic and atraumatic.      Right Ear: External ear normal.      Left Ear: External ear normal.      Nose: Nose normal.      Mouth/Throat:      Mouth: Mucous membranes are moist.      Pharynx: No oropharyngeal exudate.   Eyes:      General: No scleral icterus.     Pupils: Pupils are equal, round, and reactive to light.   Cardiovascular:      Rate and Rhythm: Normal rate and regular rhythm.      Pulses: Normal pulses.      Heart sounds: Normal heart sounds.   Pulmonary:      Effort: Pulmonary effort is normal. No respiratory distress.      Breath sounds: Normal breath sounds. No wheezing or rales.   Abdominal:      General: Bowel sounds are normal. There is no distension.      Tenderness: There is no abdominal tenderness.   Musculoskeletal:         General: Normal range of motion.      Cervical back: Normal range of motion and neck supple.      Right lower leg: No edema.      Left lower leg: No edema.   Skin:     General: Skin is warm and dry.   Neurological:      Mental Status: She is alert. Mental status is at baseline.   Psychiatric:         Mood and Affect: Mood normal.         Behavior: Behavior normal.       Significant Labs: All pertinent labs within the past 24 hours have been reviewed.    Significant Imaging: I have reviewed all pertinent imaging results/findings within the past 24  hours.      Assessment/Plan:      Pelvic ring fracture and Pubic Ramus Fracture  Patient presented after a recent fall with pain.  Her imaging showed right pubic ramus fracture and left pelvic ring fracture.  Ortho consulted and patient non-surgical.  Recommended partial WB on LLE.  -PRN pain meds  -PT consulted - recommends rehab  -DVT prophylaxis with SQH for now      HLD (hyperlipidemia)  -Continue Atorvastatin 10 mg QD       CKD (chronic kidney disease) stage 3, GFR 30-59 ml/min  Cr is 1.9 on admission, which is better than recent measures.  Stable at 1.8 yesterday.  - avoid nephrotoxins   - renally dose meds       Type 2 diabetes mellitus  Home Meds:  Metformin 500mg  A1C 5.1 on admission  Glucose well controlled  - Diabetic diet   - will discontinues SSI with accuchecks AC/HS        Down syndrome  - history noted       VTE Risk Mitigation (From admission, onward)         Ordered     heparin (porcine) injection 5,000 Units  Every 8 hours         04/13/22 1523     IP VTE LOW RISK PATIENT  Once         04/12/22 2327     Place sequential compression device  Until discontinued         04/12/22 2327                Discharge Planning   COMPA:      Code Status: Full Code   Is the patient medically ready for discharge?:     Reason for patient still in hospital (select all that apply): Patient trending condition, Treatment and Consult recommendations  Discharge Plan A: Rehab   Discharge Delays:  (pending medical clearance)              Jermaine Maki MD  Department of Hospital Medicine   [facility] Med Surg ([facility])

## 2022-04-15 NOTE — PLAN OF CARE
Problem: Adult Inpatient Plan of Care  Goal: Plan of Care Review  Outcome: Ongoing, Progressing  Goal: Patient-Specific Goal (Individualized)  Outcome: Ongoing, Progressing  Goal: Absence of Hospital-Acquired Illness or Injury  Outcome: Ongoing, Progressing  Goal: Optimal Comfort and Wellbeing  Outcome: Ongoing, Progressing  Goal: Readiness for Transition of Care  Outcome: Ongoing, Progressing     Problem: Diabetes Comorbidity  Goal: Blood Glucose Level Within Targeted Range  Outcome: Ongoing, Progressing     Problem: Fall Injury Risk  Goal: Absence of Fall and Fall-Related Injury  Outcome: Ongoing, Progressing

## 2022-04-16 PROCEDURE — 96372 THER/PROPH/DIAG INJ SC/IM: CPT | Performed by: INTERNAL MEDICINE

## 2022-04-16 PROCEDURE — G0378 HOSPITAL OBSERVATION PER HR: HCPCS

## 2022-04-16 PROCEDURE — 99226 PR SUBSEQUENT OBSERVATION CARE,LEVEL III: CPT | Mod: ,,, | Performed by: INTERNAL MEDICINE

## 2022-04-16 PROCEDURE — 97530 THERAPEUTIC ACTIVITIES: CPT

## 2022-04-16 PROCEDURE — 97535 SELF CARE MNGMENT TRAINING: CPT

## 2022-04-16 PROCEDURE — 25000003 PHARM REV CODE 250: Performed by: INTERNAL MEDICINE

## 2022-04-16 PROCEDURE — 99226 PR SUBSEQUENT OBSERVATION CARE,LEVEL III: ICD-10-PCS | Mod: ,,, | Performed by: INTERNAL MEDICINE

## 2022-04-16 PROCEDURE — 63600175 PHARM REV CODE 636 W HCPCS: Performed by: INTERNAL MEDICINE

## 2022-04-16 RX ADMIN — LEVOTHYROXINE SODIUM 50 MCG: 50 TABLET ORAL at 06:04

## 2022-04-16 RX ADMIN — ATORVASTATIN CALCIUM 10 MG: 10 TABLET, FILM COATED ORAL at 09:04

## 2022-04-16 RX ADMIN — OXYBUTYNIN CHLORIDE 10 MG: 5 TABLET, EXTENDED RELEASE ORAL at 09:04

## 2022-04-16 RX ADMIN — FOLIC ACID 1 MG: 1 TABLET ORAL at 09:04

## 2022-04-16 RX ADMIN — TRAMADOL HYDROCHLORIDE 50 MG: 50 TABLET, COATED ORAL at 02:04

## 2022-04-16 RX ADMIN — Medication 1 TABLET: at 05:04

## 2022-04-16 RX ADMIN — HEPARIN SODIUM 5000 UNITS: 5000 INJECTION INTRAVENOUS; SUBCUTANEOUS at 06:04

## 2022-04-16 RX ADMIN — Medication 1 TABLET: at 09:04

## 2022-04-16 RX ADMIN — OXYCODONE HYDROCHLORIDE AND ACETAMINOPHEN 500 MG: 500 TABLET ORAL at 09:04

## 2022-04-16 RX ADMIN — HEPARIN SODIUM 5000 UNITS: 5000 INJECTION INTRAVENOUS; SUBCUTANEOUS at 02:04

## 2022-04-16 RX ADMIN — THERA TABS 1 TABLET: TAB at 09:04

## 2022-04-16 RX ADMIN — HEPARIN SODIUM 5000 UNITS: 5000 INJECTION INTRAVENOUS; SUBCUTANEOUS at 10:04

## 2022-04-16 NOTE — CARE UPDATE
04/15/22 2013   PRE-TX-O2   O2 Device (Oxygen Therapy) room air   SpO2 96 %   Pulse Oximetry Type Intermittent

## 2022-04-16 NOTE — PLAN OF CARE
Pt lying in bed awake, safety precautions maintained. Tolerating diet well, pain controlled with PRN medication. Purposeful rounding was preformed during this shift.  Problem: Adult Inpatient Plan of Care  Goal: Plan of Care Review  Outcome: Ongoing, Progressing  Goal: Patient-Specific Goal (Individualized)  Outcome: Ongoing, Progressing  Goal: Absence of Hospital-Acquired Illness or Injury  Outcome: Ongoing, Progressing  Goal: Optimal Comfort and Wellbeing  Outcome: Ongoing, Progressing  Goal: Readiness for Transition of Care  Outcome: Ongoing, Progressing     Problem: Diabetes Comorbidity  Goal: Blood Glucose Level Within Targeted Range  Outcome: Ongoing, Progressing     Problem: Fall Injury Risk  Goal: Absence of Fall and Fall-Related Injury  Outcome: Ongoing, Progressing     Problem: Skin Injury Risk Increased  Goal: Skin Health and Integrity  Outcome: Ongoing, Progressing     Problem: Infection  Goal: Absence of Infection Signs and Symptoms  Outcome: Ongoing, Progressing

## 2022-04-16 NOTE — PROGRESS NOTES
[facility] Medicine  Progress Note    Patient Name: Shannan Medrano  MRN: [id number]  Patient Class: OP- Observation   Admission Date: 4/12/2022  Length of Stay: 0 days  Attending Physician: Jermaine Maki MD  Primary Care Provider: Primary Doctor No        Subjective:     Principal Problem:Pubic ramus fracture, right, closed, initial encounter      HPI:  Ms. Shannan Medrano is a 51 y.o. female, with PMH of Down Syndrome, T2DM, HLD, CKD-3, Cardiomyopathy, urinary retention s/p suprapubic catheter placement, who [place] [facility] [place] due to left hip & right buttocks pain after falling on Saturday. She further notes chronic lower abdominal pain. Per her caregiver's report, she fell down 4 steps. She states she has balance issues, but can walk, but has been unable to do so since her fall due to pain with bearing weight. She was evaluated in the ED with labs showing normal H&H, and Cr of 1.9. X-ray of the hips showed possible fracture of the right superior ramus. A CT of the pelvis showed a nondisplaced fracture of the right pubic ramus adjacent tot he symphysis. There was also a contralateral left sacral nondisplaced fracture of the pelvic ring. X-ray of the lumbar spine showed wedging of L1 vertebral body which may be remote or normal variant as well as Gr 1 anterolisthesis of L4 on L5. She was placed on OBS.       Overview/Hospital Course:  No notes on file    Interval History: Patient is awake and alert this morning.  She is mostly nonverbal, but points to suprapubic area as pain.  Looks comfortable in bed without distress.  No adverse events reported overnight.  Stable and awaiting Rehab.    Review of Systems   Unable to perform ROS: Patient nonverbal   Constitutional:  Negative for chills and fever.   Respiratory:  Negative for shortness of breath.    Objective:     Vital Signs (Most Recent):  Temp: 98.4 °F (36.9 °C) (04/16/22 1225)  Pulse: 76 (04/16/22 1225)  Resp: 18  Yes, she should have upcoming f/u (04/16/22 1225)  BP: 118/74 (04/16/22 1225)  SpO2: 98 % (04/16/22 1225) Vital Signs (24h Range):  Temp:  [97.9 °F (36.6 °C)-98.5 °F (36.9 °C)] 98.4 °F (36.9 °C)  Pulse:  [64-86] 76  Resp:  [18-20] 18  SpO2:  [96 %-98 %] 98 %  BP: ()/(57-74) 118/74     Weight: 44 kg (97 lb)  Body mass index is 18.94 kg/m².    Intake/Output Summary (Last 24 hours) at 4/16/2022 1245  Last data filed at 4/15/2022 1800  Gross per 24 hour   Intake 480 ml   Output 400 ml   Net 80 ml        Physical Exam  Constitutional:       General: She is not in acute distress.     Appearance: She is normal weight. She is ill-appearing (chronically).   HENT:      Head: Normocephalic and atraumatic.      Right Ear: External ear normal.      Left Ear: External ear normal.      Nose: Nose normal.      Mouth/Throat:      Mouth: Mucous membranes are moist.      Pharynx: No oropharyngeal exudate.   Eyes:      General: No scleral icterus.     Pupils: Pupils are equal, round, and reactive to light.   Cardiovascular:      Rate and Rhythm: Normal rate and regular rhythm.      Pulses: Normal pulses.      Heart sounds: Normal heart sounds.   Pulmonary:      Effort: Pulmonary effort is normal. No respiratory distress.      Breath sounds: Normal breath sounds. No wheezing or rales.   Abdominal:      General: Bowel sounds are normal. There is no distension.      Tenderness: There is no abdominal tenderness.   Musculoskeletal:         General: Normal range of motion.      Cervical back: Normal range of motion and neck supple.      Right lower leg: No edema.      Left lower leg: No edema.   Skin:     General: Skin is warm and dry.   Neurological:      Mental Status: She is alert. Mental status is at baseline.   Psychiatric:         Mood and Affect: Mood normal.         Behavior: Behavior normal.       Significant Labs: All pertinent labs within the past 24 hours have been reviewed.    Significant Imaging: I have reviewed all pertinent imaging results/findings  within the past 24 hours.      Assessment/Plan:      Pelvic ring fracture and Pubic Ramus Fracture  Patient presented after a recent fall with pain.  Her imaging showed right pubic ramus fracture and left pelvic ring fracture.  Ortho consulted and patient non-surgical.  Recommended partial WB on LLE.  -PRN pain meds  -PT consulted - recommends rehab  -DVT prophylaxis with SQH for now      HLD (hyperlipidemia)  -Continue Atorvastatin 10 mg QD       CKD (chronic kidney disease) stage 3, GFR 30-59 ml/min  Cr is 1.9 on admission, which is better than recent measures.  Stable at 1.8.  - avoid nephrotoxins   - renally dose meds       Type 2 diabetes mellitus  Home Meds:  Metformin 500mg  A1C 5.1 on admission  Glucose well controlled  - Diabetic diet   - will discontinues SSI with accuchecks AC/HS        Down syndrome  Noted      VTE Risk Mitigation (From admission, onward)         Ordered     heparin (porcine) injection 5,000 Units  Every 8 hours         04/13/22 1523     IP VTE LOW RISK PATIENT  Once         04/12/22 2327     Place sequential compression device  Until discontinued         04/12/22 2327                Discharge Planning   COMPA:      Code Status: Full Code   Is the patient medically ready for discharge?:     Reason for patient still in hospital (select all that apply): Patient trending condition, Treatment and Consult recommendations  Discharge Plan A: Rehab   Discharge Delays:  (pending medical clearance)              Jermaine Maki MD  Department of Hospital Medicine   [facility] ([facility])

## 2022-04-16 NOTE — PT/OT/SLP PROGRESS
Occupational Therapy   Treatment    Name: Shannan Medrano  MRN: [id number]  Admitting Diagnosis:  Pubic ramus fracture, right, closed, initial encounter       Recommendations:     Discharge Recommendations: rehabilitation facility  Discharge Equipment Recommendations:   (Grey RW, light weigt WC with pressure relieving cushion)  Barriers to discharge:   (current functional level)    Assessment:   Pt. Maintains mostly flat affect throughout session though during functional transfer demos quick breathing, very tight , and fearful expression.  With difficulty maintaining PWB to LLE initially during functional transfer requiring MOD A of 2 persons upon return to bed specifically for LLE management during functional task.  Requiring MIN sequencing and setup for seated grooming/hygiene task at sink.  Progressing towards goals.  Continued recommendation of post acute OT in IRF.  To benefit from continued acute care OT services to increase independence in self-care/functional transfers.  Continue POC.     Shannan Medrano is a 51 y.o. female with a medical diagnosis of Pubic ramus fracture, right, closed, initial encounter.  She presents with below deficits decreasing independence in self-care/functional transfers. Performance deficits affecting function are weakness, impaired endurance, impaired self care skills, impaired functional mobilty, gait instability, decreased lower extremity function, decreased upper extremity function, impaired balance, decreased safety awareness, orthopedic precautions, decreased ROM, pain.     Rehab Prognosis:  Good; patient would benefit from acute skilled OT services to address these deficits and reach maximum level of function.       Plan:     Patient to be seen 4 x/week to address the above listed problems via self-care/home management, therapeutic activities, therapeutic exercises  · Plan of Care Expires: 05/14/22  · Plan of Care Reviewed with: patient    Subjective      Pain/Comfort:  · Pain Rating 1:  (No signs of pain at rest; Pt. maintains mostly flat affect.)  · Location - Side 1: Bilateral  · Location 1: hip  · Pain Addressed 1: Distraction, Cessation of Activity, Nurse notified  · Pain Rating Post-Intervention 1: 3/10 (PAINDAD score of 3 with functional transfer.)    Objective:     Communicated with: Avril Angeles LPN prior to session.  Patient found HOB elevated with peripheral IV (suprapubic catheter) upon OT entry to room.    General Precautions: Standard, fall, contact   Orthopedic Precautions:LLE partial weight bearing   Braces: N/A  Respiratory Status: Room air     Occupational Performance:     Bed Mobility:    · MAX A for supine>sit and MOD A for sit>supine with BLE management.  Requiring increased assist with scooting towards EOB while supine.      Functional Mobility/Transfers:  · Step transfer bed>W/C with RW with MOD A, increased instruction for safe timing of controlled descent and MIN follow through of PWB to LLE.  Stand-pivot W/C>bed with MOD A of 2 persons for maintaining LLE PWB and for lift from chair/pivot.      Activities of Daily Living:  · Oral hygiene and washing face seated in W/C at sink with setup of needed items for task along with MIN sequencing cues.        [facility] 6 Click ADL: 14    Treatment & Education:  · MAX A for supine>sit and MOD A for sit>supine with BLE management.  Requiring increased assist with scooting towards EOB while supine.    · Step transfer bed>W/C with RW with MOD A, increased instruction for safe timing of controlled descent and MIN follow through of PWB to LLE.  Stand-pivot W/C>bed with MOD A of 2 persons for maintaining LLE PWB and for lift from chair/pivot.    · Oral hygiene and washing face seated in W/C at sink with setup of needed items for task along with MIN sequencing cues.    · Targeted overhead and forward reaching while seated in W/C with MOD verbal/tactile cuing for follow through.    Patient left HOB elevated  with all lines intact, call button in reach and nursing notifiedEducation:      GOALS:   Multidisciplinary Problems     Occupational Therapy Goals        Problem: Occupational Therapy    Goal Priority Disciplines Outcome Interventions   Occupational Therapy Goal     OT, PT/OT Ongoing, Progressing    Description: Goals to be met by: 4/28/22    Patient will increase functional independence with ADLs by performing:    LE Dressing with Minimal Assistance.  Grooming while standing at sink with Stand-by Assistance.  Toileting from bedside commode with Contact Guard Assistance for hygiene and clothing management.   Toilet transfer to bedside commode with Contact Guard Assistance.                     Time Tracking:     OT Date of Treatment: 04/16/22  OT Start Time: 1328  OT Stop Time: 1414  OT Total Time (min): 46 min    Billable Minutes:Self Care/Home Management 15  Therapeutic Activity 31    OT/MYNOR: OT          4/16/2022

## 2022-04-16 NOTE — PT/OT/SLP PROGRESS
Physical Therapy      Patient Name:  Shannan Medrano   MRN:  [id number]    Patient not seen today secondary to Patient fatigue; PT checked on pt three times during AM and pt sleeping at all three attempts. Pt noted she is tired on this date. PT offered to do therapy session with pt, however, pt closed her eyes and returned to sleep. Will follow-up on next available date as pt is available / appropriate.

## 2022-04-16 NOTE — SUBJECTIVE & OBJECTIVE
Interval History: Patient is awake and alert this morning.  She is mostly nonverbal, but points to suprapubic area as pain.  Looks comfortable in bed without distress.  No adverse events reported overnight.  Stable and awaiting Rehab.    Review of Systems   Unable to perform ROS: Patient nonverbal   Constitutional:  Negative for chills and fever.   Respiratory:  Negative for shortness of breath.    Objective:     Vital Signs (Most Recent):  Temp: 98.4 °F (36.9 °C) (04/16/22 1225)  Pulse: 76 (04/16/22 1225)  Resp: 18 (04/16/22 1225)  BP: 118/74 (04/16/22 1225)  SpO2: 98 % (04/16/22 1225) Vital Signs (24h Range):  Temp:  [97.9 °F (36.6 °C)-98.5 °F (36.9 °C)] 98.4 °F (36.9 °C)  Pulse:  [64-86] 76  Resp:  [18-20] 18  SpO2:  [96 %-98 %] 98 %  BP: ()/(57-74) 118/74     Weight: 44 kg (97 lb)  Body mass index is 18.94 kg/m².    Intake/Output Summary (Last 24 hours) at 4/16/2022 1245  Last data filed at 4/15/2022 1800  Gross per 24 hour   Intake 480 ml   Output 400 ml   Net 80 ml        Physical Exam  Constitutional:       General: She is not in acute distress.     Appearance: She is normal weight. She is ill-appearing (chronically).   HENT:      Head: Normocephalic and atraumatic.      Right Ear: External ear normal.      Left Ear: External ear normal.      Nose: Nose normal.      Mouth/Throat:      Mouth: Mucous membranes are moist.      Pharynx: No oropharyngeal exudate.   Eyes:      General: No scleral icterus.     Pupils: Pupils are equal, round, and reactive to light.   Cardiovascular:      Rate and Rhythm: Normal rate and regular rhythm.      Pulses: Normal pulses.      Heart sounds: Normal heart sounds.   Pulmonary:      Effort: Pulmonary effort is normal. No respiratory distress.      Breath sounds: Normal breath sounds. No wheezing or rales.   Abdominal:      General: Bowel sounds are normal. There is no distension.      Tenderness: There is no abdominal tenderness.   Musculoskeletal:         General: Normal  range of motion.      Cervical back: Normal range of motion and neck supple.      Right lower leg: No edema.      Left lower leg: No edema.   Skin:     General: Skin is warm and dry.   Neurological:      Mental Status: She is alert. Mental status is at baseline.   Psychiatric:         Mood and Affect: Mood normal.         Behavior: Behavior normal.       Significant Labs: All pertinent labs within the past 24 hours have been reviewed.    Significant Imaging: I have reviewed all pertinent imaging results/findings within the past 24 hours.

## 2022-04-16 NOTE — ASSESSMENT & PLAN NOTE
Cr is 1.9 on admission, which is better than recent measures.  Stable at 1.8.  - avoid nephrotoxins   - renally dose meds

## 2022-04-17 PROCEDURE — G0378 HOSPITAL OBSERVATION PER HR: HCPCS

## 2022-04-17 PROCEDURE — 94761 N-INVAS EAR/PLS OXIMETRY MLT: CPT

## 2022-04-17 PROCEDURE — 99226 PR SUBSEQUENT OBSERVATION CARE,LEVEL III: CPT | Mod: ,,, | Performed by: INTERNAL MEDICINE

## 2022-04-17 PROCEDURE — 96372 THER/PROPH/DIAG INJ SC/IM: CPT | Performed by: INTERNAL MEDICINE

## 2022-04-17 PROCEDURE — 25000003 PHARM REV CODE 250: Performed by: INTERNAL MEDICINE

## 2022-04-17 PROCEDURE — 99226 PR SUBSEQUENT OBSERVATION CARE,LEVEL III: ICD-10-PCS | Mod: ,,, | Performed by: INTERNAL MEDICINE

## 2022-04-17 PROCEDURE — 63600175 PHARM REV CODE 636 W HCPCS: Performed by: INTERNAL MEDICINE

## 2022-04-17 RX ADMIN — OXYBUTYNIN CHLORIDE 10 MG: 5 TABLET, EXTENDED RELEASE ORAL at 08:04

## 2022-04-17 RX ADMIN — HEPARIN SODIUM 5000 UNITS: 5000 INJECTION INTRAVENOUS; SUBCUTANEOUS at 05:04

## 2022-04-17 RX ADMIN — OXYCODONE HYDROCHLORIDE AND ACETAMINOPHEN 500 MG: 500 TABLET ORAL at 08:04

## 2022-04-17 RX ADMIN — Medication 1 TABLET: at 08:04

## 2022-04-17 RX ADMIN — TRAMADOL HYDROCHLORIDE 50 MG: 50 TABLET, COATED ORAL at 04:04

## 2022-04-17 RX ADMIN — HEPARIN SODIUM 5000 UNITS: 5000 INJECTION INTRAVENOUS; SUBCUTANEOUS at 10:04

## 2022-04-17 RX ADMIN — FOLIC ACID 1 MG: 1 TABLET ORAL at 08:04

## 2022-04-17 RX ADMIN — ACETAMINOPHEN 650 MG: 325 TABLET, FILM COATED ORAL at 08:04

## 2022-04-17 RX ADMIN — ATORVASTATIN CALCIUM 10 MG: 10 TABLET, FILM COATED ORAL at 08:04

## 2022-04-17 RX ADMIN — HEPARIN SODIUM 5000 UNITS: 5000 INJECTION INTRAVENOUS; SUBCUTANEOUS at 01:04

## 2022-04-17 RX ADMIN — TRAMADOL HYDROCHLORIDE 50 MG: 50 TABLET, COATED ORAL at 02:04

## 2022-04-17 RX ADMIN — THERA TABS 1 TABLET: TAB at 08:04

## 2022-04-17 RX ADMIN — LEVOTHYROXINE SODIUM 50 MCG: 50 TABLET ORAL at 05:04

## 2022-04-17 RX ADMIN — Medication 1 TABLET: at 04:04

## 2022-04-17 NOTE — PROGRESS NOTES
[facility] Medicine  Progress Note    Patient Name: Shannan Medrano  MRN: [id number]  Patient Class: OP- Observation   Admission Date: 4/12/2022  Length of Stay: 0 days  Attending Physician: Jermaine Maki MD  Primary Care Provider: Primary Doctor No        Subjective:     Principal Problem:Pubic ramus fracture, right, closed, initial encounter        HPI:  Ms. Shannan Medrano is a 51 y.o. female, with PMH of Down Syndrome, T2DM, HLD, CKD-3, Cardiomyopathy, urinary retention s/p suprapubic catheter placement, who presented to [facility] ED on 4/12/22 due to left hip & right buttocks pain after falling on Saturday. She further notes chronic lower abdominal pain. Per her caregiver's report, she fell down 4 steps. She states she has balance issues, but can walk, but has been unable to do so since her fall due to pain with bearing weight. She was evaluated in the ED with labs showing normal H&H, and Cr of 1.9. X-ray of the hips showed possible fracture of the right superior ramus. A CT of the pelvis showed a nondisplaced fracture of the right pubic ramus adjacent tot he symphysis. There was also a contralateral left sacral nondisplaced fracture of the pelvic ring. X-ray of the lumbar spine showed wedging of L1 vertebral body which may be remote or normal variant as well as Gr 1 anterolisthesis of L4 on L5. She was placed on OBS.       Overview/Hospital Course:  See below      Interval History: Patient is awake and alert this morning.  She is mostly nonverbal, but points to suprapubic area as pain.  Looks comfortable in bed without distress.  No adverse events reported overnight.  Stable and awaiting Rehab.    Review of Systems   Unable to perform ROS: Patient nonverbal   Constitutional:  Negative for chills and fever.   Respiratory:  Negative for shortness of breath.    Objective:     Vital Signs (Most Recent):  Temp: 97.9 °F (36.6 °C) (04/17/22 0726)  Pulse: (!) 59 (04/17/22 0726)  Resp: 20  (04/17/22 0726)  BP: 130/67 (04/17/22 0726)  SpO2: 98 % (04/17/22 0726) Vital Signs (24h Range):  Temp:  [97.9 °F (36.6 °C)-98.4 °F (36.9 °C)] 97.9 °F (36.6 °C)  Pulse:  [56-79] 59  Resp:  [18-20] 20  SpO2:  [97 %-100 %] 98 %  BP: ()/(57-74) 130/67     Weight: 44 kg (97 lb)  Body mass index is 18.94 kg/m².    Intake/Output Summary (Last 24 hours) at 4/17/2022 1210  Last data filed at 4/17/2022 1000  Gross per 24 hour   Intake 240 ml   Output 800 ml   Net -560 ml        Physical Exam  Constitutional:       General: She is not in acute distress.     Appearance: She is normal weight. She is ill-appearing (chronically).   HENT:      Head: Normocephalic and atraumatic.      Right Ear: External ear normal.      Left Ear: External ear normal.      Nose: Nose normal.      Mouth/Throat:      Mouth: Mucous membranes are moist.      Pharynx: No oropharyngeal exudate.   Eyes:      General: No scleral icterus.     Pupils: Pupils are equal, round, and reactive to light.   Cardiovascular:      Rate and Rhythm: Normal rate and regular rhythm.      Pulses: Normal pulses.      Heart sounds: Normal heart sounds.   Pulmonary:      Effort: Pulmonary effort is normal. No respiratory distress.      Breath sounds: Normal breath sounds. No wheezing or rales.   Abdominal:      General: Bowel sounds are normal. There is no distension.      Tenderness: There is no abdominal tenderness.   Genitourinary:     Comments: SPT with clear yellow urine in bag  Musculoskeletal:         General: Normal range of motion.      Cervical back: Normal range of motion and neck supple.      Right lower leg: No edema.      Left lower leg: No edema.   Skin:     General: Skin is warm and dry.   Neurological:      Mental Status: She is alert. Mental status is at baseline.   Psychiatric:         Mood and Affect: Mood normal.         Behavior: Behavior normal.       Significant Labs: All pertinent labs within the past 24 hours have been reviewed.    Significant  Imaging: I have reviewed all pertinent imaging results/findings within the past 24 hours.      Assessment/Plan:      Pelvic ring fracture and Pubic Ramus Fracture  Patient presented after a recent fall with pain.  Her imaging showed right pubic ramus fracture and left pelvic ring fracture.  Ortho consulted and patient non-surgical.  Recommended partial WB on LLE.  -PRN pain meds  -PT consulted - recommends rehab  -DVT prophylaxis with SQH for now      HLD (hyperlipidemia)  -Continue Atorvastatin 10 mg QD       CKD (chronic kidney disease) stage 3, GFR 30-59 ml/min  Cr is 1.9 on admission, which is better than recent measures.  Stable at 1.8.  - avoid nephrotoxins   - renally dose meds       Type 2 diabetes mellitus  Home Meds:  Metformin 500mg  A1C 5.1 on admission  Glucose well controlled  - Diabetic diet   - will discontinues SSI with accuchecks AC/HS        Down syndrome  Noted    VTE Risk Mitigation (From admission, onward)         Ordered     heparin (porcine) injection 5,000 Units  Every 8 hours         04/13/22 1523     IP VTE LOW RISK PATIENT  Once         04/12/22 2327     Place sequential compression device  Until discontinued         04/12/22 2327                Discharge Planning   COMPA:      Code Status: Full Code   Is the patient medically ready for discharge?:     Reason for patient still in hospital (select all that apply): Patient trending condition, Treatment and Consult recommendations  Discharge Plan A: Rehab   Discharge Delays:  (pending medical clearance)              Jermaine Maki MD  Department of Hospital Medicine   [facility] ([facility])

## 2022-04-17 NOTE — SUBJECTIVE & OBJECTIVE
Interval History: Patient is awake and alert this morning.  She is mostly nonverbal, but points to suprapubic area as pain.  Looks comfortable in bed without distress.  No adverse events reported overnight.  Stable and awaiting Rehab.    Review of Systems   Unable to perform ROS: Patient nonverbal   Constitutional:  Negative for chills and fever.   Respiratory:  Negative for shortness of breath.    Objective:     Vital Signs (Most Recent):  Temp: 97.9 °F (36.6 °C) (04/17/22 0726)  Pulse: (!) 59 (04/17/22 0726)  Resp: 20 (04/17/22 0726)  BP: 130/67 (04/17/22 0726)  SpO2: 98 % (04/17/22 0726) Vital Signs (24h Range):  Temp:  [97.9 °F (36.6 °C)-98.4 °F (36.9 °C)] 97.9 °F (36.6 °C)  Pulse:  [56-79] 59  Resp:  [18-20] 20  SpO2:  [97 %-100 %] 98 %  BP: ()/(57-74) 130/67     Weight: 44 kg (97 lb)  Body mass index is 18.94 kg/m².    Intake/Output Summary (Last 24 hours) at 4/17/2022 1210  Last data filed at 4/17/2022 1000  Gross per 24 hour   Intake 240 ml   Output 800 ml   Net -560 ml        Physical Exam  Constitutional:       General: She is not in acute distress.     Appearance: She is normal weight. She is ill-appearing (chronically).   HENT:      Head: Normocephalic and atraumatic.      Right Ear: External ear normal.      Left Ear: External ear normal.      Nose: Nose normal.      Mouth/Throat:      Mouth: Mucous membranes are moist.      Pharynx: No oropharyngeal exudate.   Eyes:      General: No scleral icterus.     Pupils: Pupils are equal, round, and reactive to light.   Cardiovascular:      Rate and Rhythm: Normal rate and regular rhythm.      Pulses: Normal pulses.      Heart sounds: Normal heart sounds.   Pulmonary:      Effort: Pulmonary effort is normal. No respiratory distress.      Breath sounds: Normal breath sounds. No wheezing or rales.   Abdominal:      General: Bowel sounds are normal. There is no distension.      Tenderness: There is no abdominal tenderness.   Genitourinary:     Comments: SPT  with clear yellow urine in bag  Musculoskeletal:         General: Normal range of motion.      Cervical back: Normal range of motion and neck supple.      Right lower leg: No edema.      Left lower leg: No edema.   Skin:     General: Skin is warm and dry.   Neurological:      Mental Status: She is alert. Mental status is at baseline.   Psychiatric:         Mood and Affect: Mood normal.         Behavior: Behavior normal.       Significant Labs: All pertinent labs within the past 24 hours have been reviewed.    Significant Imaging: I have reviewed all pertinent imaging results/findings within the past 24 hours.

## 2022-04-17 NOTE — HOSPITAL COURSE
Patient presented after a recent fall with pain.  Her imaging showed right pubic ramus fracture and left pelvic ring fracture.  Ortho consulted and patient non-surgical.  Recommended partial WB on LLE.  Seen by PT and recommended for Rehab.  See below

## 2022-04-18 LAB
ANION GAP SERPL CALC-SCNC: 12 MMOL/L (ref 8–16)
BASOPHILS # BLD AUTO: 0.08 K/UL (ref 0–0.2)
BASOPHILS NFR BLD: 0.7 % (ref 0–1.9)
BUN SERPL-MCNC: 29 MG/DL (ref 6–20)
CALCIUM SERPL-MCNC: 9.2 MG/DL (ref 8.7–10.5)
CHLORIDE SERPL-SCNC: 102 MMOL/L (ref 95–110)
CO2 SERPL-SCNC: 24 MMOL/L (ref 23–29)
CREAT SERPL-MCNC: 1.7 MG/DL (ref 0.5–1.4)
DIFFERENTIAL METHOD: ABNORMAL
EOSINOPHIL # BLD AUTO: 0.1 K/UL (ref 0–0.5)
EOSINOPHIL NFR BLD: 0.7 % (ref 0–8)
ERYTHROCYTE [DISTWIDTH] IN BLOOD BY AUTOMATED COUNT: 12.9 % (ref 11.5–14.5)
EST. GFR  (AFRICAN AMERICAN): 40 ML/MIN/1.73 M^2
EST. GFR  (NON AFRICAN AMERICAN): 34 ML/MIN/1.73 M^2
GLUCOSE SERPL-MCNC: 105 MG/DL (ref 70–110)
HCT VFR BLD AUTO: 43.6 % (ref 37–48.5)
HGB BLD-MCNC: 14.4 G/DL (ref 12–16)
IMM GRANULOCYTES # BLD AUTO: 0.08 K/UL (ref 0–0.04)
IMM GRANULOCYTES NFR BLD AUTO: 0.7 % (ref 0–0.5)
LYMPHOCYTES # BLD AUTO: 1.6 K/UL (ref 1–4.8)
LYMPHOCYTES NFR BLD: 12.9 % (ref 18–48)
MAGNESIUM SERPL-MCNC: 2.1 MG/DL (ref 1.6–2.6)
MCH RBC QN AUTO: 33.2 PG (ref 27–31)
MCHC RBC AUTO-ENTMCNC: 33 G/DL (ref 32–36)
MCV RBC AUTO: 101 FL (ref 82–98)
MONOCYTES # BLD AUTO: 0.5 K/UL (ref 0.3–1)
MONOCYTES NFR BLD: 4.1 % (ref 4–15)
NEUTROPHILS # BLD AUTO: 10 K/UL (ref 1.8–7.7)
NEUTROPHILS NFR BLD: 80.9 % (ref 38–73)
NRBC BLD-RTO: 0 /100 WBC
PLATELET # BLD AUTO: 413 K/UL (ref 150–450)
PMV BLD AUTO: 9.2 FL (ref 9.2–12.9)
POCT GLUCOSE: 122 MG/DL (ref 70–110)
POTASSIUM SERPL-SCNC: 4.3 MMOL/L (ref 3.5–5.1)
RBC # BLD AUTO: 4.34 M/UL (ref 4–5.4)
SODIUM SERPL-SCNC: 138 MMOL/L (ref 136–145)
WBC # BLD AUTO: 12.29 K/UL (ref 3.9–12.7)

## 2022-04-18 PROCEDURE — 25000003 PHARM REV CODE 250: Performed by: INTERNAL MEDICINE

## 2022-04-18 PROCEDURE — 63600175 PHARM REV CODE 636 W HCPCS: Performed by: INTERNAL MEDICINE

## 2022-04-18 PROCEDURE — 83735 ASSAY OF MAGNESIUM: CPT | Performed by: INTERNAL MEDICINE

## 2022-04-18 PROCEDURE — 97535 SELF CARE MNGMENT TRAINING: CPT

## 2022-04-18 PROCEDURE — G0378 HOSPITAL OBSERVATION PER HR: HCPCS

## 2022-04-18 PROCEDURE — 97110 THERAPEUTIC EXERCISES: CPT | Mod: CQ

## 2022-04-18 PROCEDURE — 97530 THERAPEUTIC ACTIVITIES: CPT

## 2022-04-18 PROCEDURE — 85025 COMPLETE CBC W/AUTO DIFF WBC: CPT | Performed by: INTERNAL MEDICINE

## 2022-04-18 PROCEDURE — 80048 BASIC METABOLIC PNL TOTAL CA: CPT | Performed by: INTERNAL MEDICINE

## 2022-04-18 PROCEDURE — 99226 PR SUBSEQUENT OBSERVATION CARE,LEVEL III: CPT | Mod: ,,, | Performed by: INTERNAL MEDICINE

## 2022-04-18 PROCEDURE — 97116 GAIT TRAINING THERAPY: CPT | Mod: CQ

## 2022-04-18 PROCEDURE — 99226 PR SUBSEQUENT OBSERVATION CARE,LEVEL III: ICD-10-PCS | Mod: ,,, | Performed by: INTERNAL MEDICINE

## 2022-04-18 PROCEDURE — 96372 THER/PROPH/DIAG INJ SC/IM: CPT | Performed by: INTERNAL MEDICINE

## 2022-04-18 PROCEDURE — 36415 COLL VENOUS BLD VENIPUNCTURE: CPT | Performed by: INTERNAL MEDICINE

## 2022-04-18 RX ADMIN — HEPARIN SODIUM 5000 UNITS: 5000 INJECTION INTRAVENOUS; SUBCUTANEOUS at 10:04

## 2022-04-18 RX ADMIN — HEPARIN SODIUM 5000 UNITS: 5000 INJECTION INTRAVENOUS; SUBCUTANEOUS at 05:04

## 2022-04-18 RX ADMIN — Medication 1 TABLET: at 08:04

## 2022-04-18 RX ADMIN — LEVOTHYROXINE SODIUM 50 MCG: 50 TABLET ORAL at 05:04

## 2022-04-18 RX ADMIN — OXYCODONE HYDROCHLORIDE AND ACETAMINOPHEN 500 MG: 500 TABLET ORAL at 08:04

## 2022-04-18 RX ADMIN — HEPARIN SODIUM 5000 UNITS: 5000 INJECTION INTRAVENOUS; SUBCUTANEOUS at 01:04

## 2022-04-18 RX ADMIN — THERA TABS 1 TABLET: TAB at 08:04

## 2022-04-18 RX ADMIN — ATORVASTATIN CALCIUM 10 MG: 10 TABLET, FILM COATED ORAL at 08:04

## 2022-04-18 RX ADMIN — Medication 1 TABLET: at 04:04

## 2022-04-18 RX ADMIN — FOLIC ACID 1 MG: 1 TABLET ORAL at 08:04

## 2022-04-18 RX ADMIN — OXYBUTYNIN CHLORIDE 10 MG: 5 TABLET, EXTENDED RELEASE ORAL at 08:04

## 2022-04-18 NOTE — PLAN OF CARE
Discussed Plan of care with patient. Verbalizes understanding. Patient is awake and alert. No falls, accidents, or traumas at this time. Patient is on room air. Contact isolation precautions maintained. Suprapubic catheter remains intact and draining clear, yellow urine. PT/OT preformed. Bed is in lowest position, SR up x2, Call light within reach. Will continue to monitor.

## 2022-04-18 NOTE — PLAN OF CARE
Patient is oriented to self. Patient denied pain overnight. Medications were administered per orders. No acute injuries or falls overnight. Patient resting at present. Bed locked in lowest position with side rails up x 2. Call light within reach of patient. Will continue purposeful rounding.

## 2022-04-18 NOTE — PT/OT/SLP PROGRESS
Physical Therapy Treatment    Patient Name:  Shannan Medrano   MRN:  [id number]    Recommendations:     Discharge Recommendations:  rehabilitation facility   Discharge Equipment Recommendations: bedside commode, walker, rolling, wheelchair (jr RW, light weigth WC with pressure relieving cushion)   Barriers to discharge: current functional level, decreased caregiver support    Assessment:     Shannan Medrano is a 51 y.o. female admitted with a medical diagnosis of Pubic ramus fracture, right, closed, initial encounter.  She presents with the following impairments/functional limitations:  weakness, impaired functional mobilty, impaired cognition, decreased safety awareness, impaired endurance, gait instability, pain, impaired joint extensibility, orthopedic precautions, decreased ROM, decreased lower extremity function, impaired self care skills, impaired balance.    Supine to sit Wander with HOB elevated. Sit<>stand with RW Wander x2 staff for lifting. Pt ambulated 2ft with RW & Wander x2 staff. Sit<>stand with RW from bedside chair Wander for lifting. Pt ambulated 2ft fwd with chair follow & began sitting due to pain. Pt performed LAQ x10 ea LE with AAROM intermittently for initiation.    Rehab Prognosis: Good; patient would benefit from acute skilled PT services to address these deficits and reach maximum level of function.    Recent Surgery: * No surgery found *      Plan:     During this hospitalization, patient to be seen 6 x/week to address the identified rehab impairments via gait training, therapeutic activities, therapeutic exercises, neuromuscular re-education and progress toward the following goals:    · Plan of Care Expires:  05/14/22    Subjective     Chief Complaint: R LE pain with WB  Patient/Family Comments/goals: pt agreeable to therapy session  Pain/Comfort:  · Pain Rating 1: 0/10  · Pain Addressed 1: Reposition, Distraction, Cessation of Activity  · Pain Rating Post-Intervention 1: 3/10      Objective:      Communicated with nurse Reis prior to session.  Patient found supine with peripheral IV (suprapubic catheter) upon PT entry to room.     General Precautions: Standard, fall, contact   Orthopedic Precautions:LLE partial weight bearing (progress to WBAT as pain improves)   Braces:    Respiratory Status: Room air     Functional Mobility:  Bed mobility- Supine to sit Wander with HOB elevated.   Transfers- Sit<>stand with RW Wander x2 staff for lifting.   Gait- Pt ambulated 2ft with RW & Wander x2 staff. Sit<>stand with RW from bedside chair Wander for lifting. Pt ambulated 2ft fwd with chair follow & began sitting due to pain. Gait deviations include vaulting on R LE due to pain, step to gait, decreased WB on R LE, unsteady requiring assist.    AM-PAC 6 CLICK MOBILITY  Turning over in bed (including adjusting bedclothes, sheets and blankets)?: 3  Sitting down on and standing up from a chair with arms (e.g., wheelchair, bedside commode, etc.): 3  Moving from lying on back to sitting on the side of the bed?: 3  Moving to and from a bed to a chair (including a wheelchair)?: 3  Need to walk in hospital room?: 2  Climbing 3-5 steps with a railing?: 1  Basic Mobility Total Score: 15       Therapeutic Activities and Exercises:  Pt performed LAQ x10 ea LE with AAROM intermittently for initiation.    Patient left up in chair with all lines intact, call button in reach, chair alarm on and OT Marisol notified (present for OT session)    GOALS:   Multidisciplinary Problems     Physical Therapy Goals        Problem: Physical Therapy    Goal Priority Disciplines Outcome Goal Variances Interventions   Physical Therapy Goal     PT, PT/OT Ongoing, Progressing     Description: Goals to be met by: 22    Patient will increase functional independence with mobility by performin. Supine<>sit with SBA without use of hospital bed features.   2. Sit<>stand with SBA with RW.  3. Gait x 50 feet with SBA with RW maintaining PWB of RLE.                    Time Tracking:     PT Received On: 04/18/22  PT Start Time: 0923     PT Stop Time: 0946  PT Total Time (min): 23 min     Billable Minutes: Gait Training 13 and Therapeutic Exercise 10   Overlap with OT for portions of session due to complex nature of patient and for safety with mobility to decrease fall risk for patient and caregiver injury requiring two skilled therapists to provide interventions.       Treatment Type: Treatment  PT/PTA: PTA     PTA Visit Number: 2     04/18/2022

## 2022-04-18 NOTE — PROGRESS NOTES
[facility] Medicine  Progress Note    Patient Name: Shannan Medrano  MRN: [id number]  Patient Class: OP- Observation   Admission Date: 4/12/2022  Length of Stay: 0 days  Attending Physician: Jermaine Maki MD  Primary Care Provider: Primary Doctor No        Subjective:     Principal Problem:Pubic ramus fracture, right, closed, initial encounter      HPI:  Ms. Shannan Medrano is a 51 y.o. female, with PMH of Down Syndrome, T2DM, HLD, CKD-3, Cardiomyopathy, urinary retention s/p suprapubic catheter placement, who presented to [facility] ED on 4/12/22 due to left hip & right buttocks pain after falling on Saturday. She further notes chronic lower abdominal pain. Per her caregiver's report, she fell down 4 steps. She states she has balance issues, but can walk, but has been unable to do so since her fall due to pain with bearing weight. She was evaluated in the ED with labs showing normal H&H, and Cr of 1.9. X-ray of the hips showed possible fracture of the right superior ramus. A CT of the pelvis showed a nondisplaced fracture of the right pubic ramus adjacent tot he symphysis. There was also a contralateral left sacral nondisplaced fracture of the pelvic ring. X-ray of the lumbar spine showed wedging of L1 vertebral body which may be remote or normal variant as well as Gr 1 anterolisthesis of L4 on L5. She was placed on OBS.       Overview/Hospital Course:  Patient presented after a recent fall with pain.  Her imaging showed right pubic ramus fracture and left pelvic ring fracture.  Ortho consulted and patient non-surgical.  Recommended partial WB on LLE.  Seen by PT and recommended for Rehab.      Interval History: Patient is awake and alert this morning.  She is mostly nonverbal, but points to suprapubic area as pain.  Looks comfortable in bed without distress.  No adverse events reported overnight.  Stable and awaiting Rehab.    Review of Systems   Unable to perform ROS: Patient nonverbal    Constitutional:  Negative for chills and fever.   Respiratory:  Negative for shortness of breath.    Objective:     Vital Signs (Most Recent):  Temp: 98.2 °F (36.8 °C) (04/18/22 0726)  Pulse: 61 (04/18/22 0726)  Resp: 18 (04/18/22 0726)  BP: 104/63 (04/18/22 0726)  SpO2: 99 % (04/18/22 0726) Vital Signs (24h Range):  Temp:  [97.6 °F (36.4 °C)-98.8 °F (37.1 °C)] 98.2 °F (36.8 °C)  Pulse:  [57-82] 61  Resp:  [18-20] 18  SpO2:  [97 %-99 %] 99 %  BP: ()/(51-64) 104/63     Weight: 44 kg (97 lb)  Body mass index is 18.94 kg/m².    Intake/Output Summary (Last 24 hours) at 4/18/2022 1036  Last data filed at 4/18/2022 0500  Gross per 24 hour   Intake 360 ml   Output 1450 ml   Net -1090 ml        Physical Exam  Constitutional:       General: She is not in acute distress.     Appearance: She is normal weight. She is ill-appearing (chronically).   HENT:      Head: Normocephalic and atraumatic.      Right Ear: External ear normal.      Left Ear: External ear normal.      Nose: Nose normal.      Mouth/Throat:      Mouth: Mucous membranes are moist.      Pharynx: No oropharyngeal exudate.   Eyes:      General: No scleral icterus.     Pupils: Pupils are equal, round, and reactive to light.   Cardiovascular:      Rate and Rhythm: Normal rate and regular rhythm.      Pulses: Normal pulses.      Heart sounds: Normal heart sounds.   Pulmonary:      Effort: Pulmonary effort is normal. No respiratory distress.      Breath sounds: Normal breath sounds. No wheezing or rales.   Abdominal:      General: Bowel sounds are normal. There is no distension.      Tenderness: There is no abdominal tenderness.   Genitourinary:     Comments: SPT with clear yellow urine in bag  Musculoskeletal:         General: Normal range of motion.      Cervical back: Normal range of motion and neck supple.      Right lower leg: No edema.      Left lower leg: No edema.   Skin:     General: Skin is warm and dry.   Neurological:      Mental Status: She is  alert. Mental status is at baseline.   Psychiatric:         Mood and Affect: Mood normal.         Behavior: Behavior normal.       Significant Labs: All pertinent labs within the past 24 hours have been reviewed.    Significant Imaging: I have reviewed all pertinent imaging results/findings within the past 24 hours.      Assessment/Plan:      Pelvic ring fracture and Pubic Ramus Fracture  Patient presented after a recent fall with pain.  Her imaging showed right pubic ramus fracture and left pelvic ring fracture.  Ortho consulted and patient non-surgical.  Recommended partial WB on LLE.  -PRN pain meds  -PT consulted - recommends rehab  -DVT prophylaxis with SQH for now      HLD (hyperlipidemia)  -Continue Atorvastatin 10 mg QD       CKD (chronic kidney disease) stage 3, GFR 30-59 ml/min  Cr is 1.9 on admission, which is better than recent measures.  Stable at 1.7.  - avoid nephrotoxins   - renally dose meds       Type 2 diabetes mellitus  Home Meds:  Metformin 500mg  A1C 5.1 on admission  Glucose well controlled  - Diabetic diet   - will discontinues SSI with accuchecks AC/HS        Down syndrome  Noted    VTE Risk Mitigation (From admission, onward)         Ordered     heparin (porcine) injection 5,000 Units  Every 8 hours         04/13/22 1523     IP VTE LOW RISK PATIENT  Once         04/12/22 2327     Place sequential compression device  Until discontinued         04/12/22 2327                Discharge Planning   COMPA:      Code Status: Full Code   Is the patient medically ready for discharge?:     Reason for patient still in hospital (select all that apply): Pending disposition  Discharge Plan A: Rehab (pending patient decision)   Discharge Delays:  (pending medical clearance)              Jermaine Maki MD  Department of Hospital Medicine   [facility]

## 2022-04-18 NOTE — SUBJECTIVE & OBJECTIVE
Interval History: Patient is awake and alert this morning.  She is mostly nonverbal, but points to suprapubic area as pain.  Looks comfortable in bed without distress.  No adverse events reported overnight.  Stable and awaiting Rehab.    Review of Systems   Unable to perform ROS: Patient nonverbal   Constitutional:  Negative for chills and fever.   Respiratory:  Negative for shortness of breath.    Objective:     Vital Signs (Most Recent):  Temp: 98.2 °F (36.8 °C) (04/18/22 0726)  Pulse: 61 (04/18/22 0726)  Resp: 18 (04/18/22 0726)  BP: 104/63 (04/18/22 0726)  SpO2: 99 % (04/18/22 0726) Vital Signs (24h Range):  Temp:  [97.6 °F (36.4 °C)-98.8 °F (37.1 °C)] 98.2 °F (36.8 °C)  Pulse:  [57-82] 61  Resp:  [18-20] 18  SpO2:  [97 %-99 %] 99 %  BP: ()/(51-64) 104/63     Weight: 44 kg (97 lb)  Body mass index is 18.94 kg/m².    Intake/Output Summary (Last 24 hours) at 4/18/2022 1036  Last data filed at 4/18/2022 0500  Gross per 24 hour   Intake 360 ml   Output 1450 ml   Net -1090 ml        Physical Exam  Constitutional:       General: She is not in acute distress.     Appearance: She is normal weight. She is ill-appearing (chronically).   HENT:      Head: Normocephalic and atraumatic.      Right Ear: External ear normal.      Left Ear: External ear normal.      Nose: Nose normal.      Mouth/Throat:      Mouth: Mucous membranes are moist.      Pharynx: No oropharyngeal exudate.   Eyes:      General: No scleral icterus.     Pupils: Pupils are equal, round, and reactive to light.   Cardiovascular:      Rate and Rhythm: Normal rate and regular rhythm.      Pulses: Normal pulses.      Heart sounds: Normal heart sounds.   Pulmonary:      Effort: Pulmonary effort is normal. No respiratory distress.      Breath sounds: Normal breath sounds. No wheezing or rales.   Abdominal:      General: Bowel sounds are normal. There is no distension.      Tenderness: There is no abdominal tenderness.   Genitourinary:     Comments: SPT with  clear yellow urine in bag  Musculoskeletal:         General: Normal range of motion.      Cervical back: Normal range of motion and neck supple.      Right lower leg: No edema.      Left lower leg: No edema.   Skin:     General: Skin is warm and dry.   Neurological:      Mental Status: She is alert. Mental status is at baseline.   Psychiatric:         Mood and Affect: Mood normal.         Behavior: Behavior normal.       Significant Labs: All pertinent labs within the past 24 hours have been reviewed.    Significant Imaging: I have reviewed all pertinent imaging results/findings within the past 24 hours.

## 2022-04-18 NOTE — ASSESSMENT & PLAN NOTE
Cr is 1.9 on admission, which is better than recent measures.  Stable at 1.7.  - avoid nephrotoxins   - renally dose meds

## 2022-04-18 NOTE — PLAN OF CARE
Referral update:    1.[facility]-denied  2.[facility] rehab-under review    Additional referrals sent:  [facility]  [facility]    Discharge pending medical clearance. [facility], [facility]  will continue to follow for d/c needs.   04/18/22 0726   Post-Acute Status   Post-Acute Authorization Placement   Post-Acute Placement Status Pending post-acute provider review/more information requested   Coverage [place] Medicaid   Discharge Delays   (pending medical clearance)   Discharge Plan   Discharge Plan A Rehab  (pending patient decision)   Discharge Plan B Home with family;Home

## 2022-04-18 NOTE — PT/OT/SLP PROGRESS
Occupational Therapy   Treatment    Name: Shannan Medrano  MRN: [id number]  Admitting Diagnosis:  Pubic ramus fracture, right, closed, initial encounter       Recommendations:     Discharge Recommendations: rehabilitation facility  Discharge Equipment Recommendations:   (deng RW, lightweight w/c with pressure cushion)  Barriers to discharge:  Other (Comment) (current functional level)    Assessment:     Shannan Medrano is a 51 y.o. female with a medical diagnosis of Pubic ramus fracture, right, closed, initial encounter.  She presents with  weakness, impaired endurance, impaired self care skills, impaired functional mobilty, gait instability, impaired balance, impaired cognition, decreased coordination, decreased upper extremity function, decreased lower extremity function, decreased safety awareness, pain, impaired coordination, orthopedic precautions.     Rehab Prognosis:  Good; patient would benefit from acute skilled OT services to address these deficits and reach maximum level of function.       Plan:     Patient to be seen 4 x/week to address the above listed problems via self-care/home management, therapeutic activities, therapeutic exercises  · Plan of Care Expires: 05/14/22  · Plan of Care Reviewed with: patient    Subjective     Pain/Comfort:  · Pain Rating 1:  (pt endorsed pain in BLEs during mobility, unable to rate)  · Pain Addressed 1: Reposition, Cessation of Activity, Distraction    Objective:     Communicated with: RN prior to session.  Patient found HOB elevated with peripheral IV (suprapubic catheter) upon OT entry to room.    General Precautions: Standard, fall   Orthopedic Precautions:LLE partial weight bearing   Braces: N/A  Respiratory Status: Room air     Occupational Performance:     Pt pleasant and participates well but limited by BLE pain during OOB activity.     Bed Mobility:    · Supine > sitting: min A with max verbal cues      Functional Mobility/Transfers:  · Sit <> stand: Min A x2 with  RW and cues for LLE PWB, pt with difficulty maintaining 2/2 cognition  · Functional Mobility: Pt required min A x2 and RW to take steps to chair. Upon sitting, pt stood and took steps forward with min A x2 and RW and chair follow. Pt limited by pain and returned to sitting. Pt participated in chair level ADLs.    Activities of Daily Living:  · Grooming: Min A with set up to participate in oral care and face washing. Pt required cues for sequencing and steps of task and min A for thoroughness during face washing.  · Dressing: Max A to don socks while seated EOB, limited by BLE pain        [facility] 6 Click ADL: 14    Treatment & Education:  OT role, plan of care, progression of goals, importance of continued OOB activity, weight bearing precautions, ADL retraining    Patient left up in chair with all lines intact, call button in reach, chair alarm on and RN notifiedEducation:      GOALS:   Multidisciplinary Problems     Occupational Therapy Goals        Problem: Occupational Therapy    Goal Priority Disciplines Outcome Interventions   Occupational Therapy Goal     OT, PT/OT Ongoing, Progressing    Description: Goals to be met by: 4/28/22    Patient will increase functional independence with ADLs by performing:    LE Dressing with Minimal Assistance.  Grooming while standing at sink with Stand-by Assistance.  Toileting from bedside commode with Contact Guard Assistance for hygiene and clothing management.   Toilet transfer to bedside commode with Contact Guard Assistance.                     Time Tracking:     OT Date of Treatment: 04/18/22  OT Start Time: 0928  OT Stop Time: 0953  OT Total Time (min): 25 min    Billable Minutes:Self Care/Home Management 15 minutes  Therapeutic Activity 10 minutes    OT/MYNOR: OT          Co treament performed due to patient's multiple deficits requiring two skilled therapists to safety assess patient's ability to complete ADLs and functional mobility in addition to accommodating for  patient's activity tolerance while in acute care setting.      4/18/2022

## 2022-04-19 PROCEDURE — 94761 N-INVAS EAR/PLS OXIMETRY MLT: CPT

## 2022-04-19 PROCEDURE — 63600175 PHARM REV CODE 636 W HCPCS: Performed by: INTERNAL MEDICINE

## 2022-04-19 PROCEDURE — 25000003 PHARM REV CODE 250: Performed by: INTERNAL MEDICINE

## 2022-04-19 PROCEDURE — 96372 THER/PROPH/DIAG INJ SC/IM: CPT | Performed by: INTERNAL MEDICINE

## 2022-04-19 PROCEDURE — G0378 HOSPITAL OBSERVATION PER HR: HCPCS

## 2022-04-19 PROCEDURE — 97530 THERAPEUTIC ACTIVITIES: CPT

## 2022-04-19 PROCEDURE — 99226 PR SUBSEQUENT OBSERVATION CARE,LEVEL III: ICD-10-PCS | Mod: ,,, | Performed by: HOSPITALIST

## 2022-04-19 PROCEDURE — 99226 PR SUBSEQUENT OBSERVATION CARE,LEVEL III: CPT | Mod: ,,, | Performed by: HOSPITALIST

## 2022-04-19 PROCEDURE — 97535 SELF CARE MNGMENT TRAINING: CPT

## 2022-04-19 RX ADMIN — OXYBUTYNIN CHLORIDE 10 MG: 5 TABLET, EXTENDED RELEASE ORAL at 08:04

## 2022-04-19 RX ADMIN — FOLIC ACID 1 MG: 1 TABLET ORAL at 08:04

## 2022-04-19 RX ADMIN — Medication 1 TABLET: at 04:04

## 2022-04-19 RX ADMIN — OXYCODONE HYDROCHLORIDE AND ACETAMINOPHEN 500 MG: 500 TABLET ORAL at 08:04

## 2022-04-19 RX ADMIN — HEPARIN SODIUM 5000 UNITS: 5000 INJECTION INTRAVENOUS; SUBCUTANEOUS at 09:04

## 2022-04-19 RX ADMIN — HEPARIN SODIUM 5000 UNITS: 5000 INJECTION INTRAVENOUS; SUBCUTANEOUS at 01:04

## 2022-04-19 RX ADMIN — THERA TABS 1 TABLET: TAB at 08:04

## 2022-04-19 RX ADMIN — Medication 1 TABLET: at 08:04

## 2022-04-19 RX ADMIN — ATORVASTATIN CALCIUM 10 MG: 10 TABLET, FILM COATED ORAL at 08:04

## 2022-04-19 NOTE — PLAN OF CARE
Discussed Plan of care with patient. Patient is awake and alert. No falls, accidents, or traumas at this time. Patient is on room air. Contact isolation precautions maintained. Suprapubic catheter remains intact and draining clear, yellow urine. PT/OT preformed. Bed is in lowest position, SR up x2, Call light within reach. Will continue to monitor    Problem: Adult Inpatient Plan of Care  Goal: Plan of Care Review  Outcome: Ongoing, Progressing  Goal: Patient-Specific Goal (Individualized)  Outcome: Ongoing, Progressing  Goal: Absence of Hospital-Acquired Illness or Injury  Outcome: Ongoing, Progressing  Goal: Optimal Comfort and Wellbeing  Outcome: Ongoing, Progressing  Goal: Readiness for Transition of Care  Outcome: Ongoing, Progressing     Problem: Diabetes Comorbidity  Goal: Blood Glucose Level Within Targeted Range  Outcome: Ongoing, Progressing     Problem: Fall Injury Risk  Goal: Absence of Fall and Fall-Related Injury  Outcome: Ongoing, Progressing     Problem: Skin Injury Risk Increased  Goal: Skin Health and Integrity  Outcome: Ongoing, Progressing     Problem: Infection  Goal: Absence of Infection Signs and Symptoms  Outcome: Ongoing, Progressing

## 2022-04-19 NOTE — PLAN OF CARE
Patient is oriented to self. Medications administered per orders. Contact precautions maintained. Suprapubic catheter in place. Patient resting at present. Bed locked in lowest position with side rails up x 2. Call light within reach of patient. Will continue purposeful rounding.

## 2022-04-19 NOTE — PT/OT/SLP PROGRESS
Physical Therapy Treatment    Patient Name:  Shannan Medrano   MRN:  [id number]    Recommendations:     Discharge Recommendations:  rehabilitation facility   Discharge Equipment Recommendations: bedside commode, walker, rolling, wheelchair (deng RW, lightweight wheelchair with pressure cushion)   Barriers to discharge: none to rehab    Assessment:     Shannan Medrano is a 51 y.o. female admitted with a medical diagnosis of Pubic ramus fracture, right, closed, initial encounter.  She presents with the following impairments/functional limitations:  weakness, impaired endurance, impaired self care skills, impaired cognition, orthopedic precautions, pain, decreased lower extremity function, impaired balance, gait instability, impaired functional mobilty.    Pt tolerated treatment well with no adverse reactions. Pt is progressing toward meeting goals. Pt performed transfers with RW with assistance. Demonstrated improved tolerance to out of bed mobility. Pt continues to be limited by pain. PT will continue to follow and progress goals as tolerated. Recommend discharge to inpatient rehab.     Rehab Prognosis: Good; patient would benefit from acute skilled PT services to address these deficits and reach maximum level of function.    Recent Surgery: * No surgery found *      Plan:     During this hospitalization, patient to be seen 6 x/week to address the identified rehab impairments via gait training, therapeutic activities, therapeutic exercises, neuromuscular re-education and progress toward the following goals:    · Plan of Care Expires:  05/14/22    Subjective     Chief Complaint: pain with movement / weight bearing  Patient/Family Comments/goals: Pt interested in getting back to chair, however, minimal verbalizations.   Pain/Comfort:  · Pain Rating 1:  (did not rate)  · Location - Side 1: Bilateral  · Location - Orientation 1: generalized  · Location 1: hip  · Pain Addressed 1: Reposition, Distraction, Cessation of  Activity, Nurse notified  · Pain Rating Post-Intervention 1:  (did not rate - increased with transfer and WB)      Objective:     Communicated with RN Smiley prior to session.  Patient found up in chair with bed alarm, peripheral IV upon PT entry to room.     General Precautions: Standard, fall   Orthopedic Precautions:LLE partial weight bearing (progress to WBAT as pain improves, per chart)   Braces: N/A  Respiratory Status: Room air     Functional Mobility:  · Bed Mobility:     · Sit to Supine: minimum assistance  · Transfers:     · Sit to Stand:  minimum assistance of 2 persons with rolling walker  · 2 trials  · Chair to bed: minimum assistance of 2 persons with rolling walker using  Stand Pivot  · Weight shifting onto LLE throughout transfer, however, not taking true steps.   · Pt required verbal cues for safe RW mgmt during transfer.     AM-PAC 6 CLICK MOBILITY  Turning over in bed (including adjusting bedclothes, sheets and blankets)?: 3  Sitting down on and standing up from a chair with arms (e.g., wheelchair, bedside commode, etc.): 3  Moving from lying on back to sitting on the side of the bed?: 3  Moving to and from a bed to a chair (including a wheelchair)?: 3  Need to walk in hospital room?: 2  Climbing 3-5 steps with a railing?: 1  Basic Mobility Total Score: 15     Therapeutic Activities and Exercises:  · Bed mobility and transfer training as described above.  · Pt sat EOB for ~5 minutes with SBA for dynamic sitting balance.     PT educated patient re:   · PT plan of care/role of PT  · Use of RW  · Fall and safety precautions  · Use of call light (don't get up without assistance)  Pt verbalized understanding, however, needs reinforcement.     The patient is safe to transfer with RN assist.  Patient encouraged to sit up in chair throughout the day to prevent deconditioning.     Patient left HOB elevated with all lines intact, call button in reach, bed alarm on and RN notified.    GOALS:   Multidisciplinary  Problems     Physical Therapy Goals        Problem: Physical Therapy    Goal Priority Disciplines Outcome Goal Variances Interventions   Physical Therapy Goal     PT, PT/OT Ongoing, Progressing     Description: Goals to be met by: 22    Patient will increase functional independence with mobility by performin. Supine<>sit with SBA without use of hospital bed features.   2. Sit<>stand with SBA with RW.  3. Gait x 50 feet with SBA with RW maintaining PWB of RLE.                   Time Tracking:     PT Received On: 22  PT Start Time: 1018     PT Stop Time: 1041  PT Total Time (min): 23 min     Billable Minutes: Therapeutic Activity 23    Treatment Type: Treatment  PT/PTA: PT     PTA Visit Number: 0     2022

## 2022-04-19 NOTE — PT/OT/SLP PROGRESS
Occupational Therapy   Treatment    Name: Shannan Medrano  MRN: [id number]  Admitting Diagnosis:  Pubic ramus fracture, right, closed, initial encounter       Recommendations:     Discharge Recommendations: rehabilitation facility  Discharge Equipment Recommendations:  bedside commode, walker, rolling, wheelchair (deng RW, lightweight wheelchair with cushion)  Barriers to discharge:   (current functional level)    Assessment:     Shannan Medrano is a 51 y.o. female with a medical diagnosis of Pubic ramus fracture, right, closed, initial encounter.  She presents with pleasant affect. Performance deficits affecting function are weakness, impaired cognition, impaired endurance, impaired balance, decreased safety awareness, impaired self care skills, impaired functional mobilty, gait instability, pain.  Pt did not rate pain, but indicated pain with sit <> stand and stand pivot transfer  Pt able to perform grooming task while seated at EOB with SBA.  Mod A, RW, with assist of 2 required to perform sit <> stand transfer from chair.  While standing with RW, Total A required to perform neela care after small bowel movement at chair level.  Mod A, RW, with assist of 2 required to perform stand pivot with pain noted.  Impaired balance and postural instability noted with transfers.    Overall, pt tolerated therapy well.  She is making progress towards goals and would continue to benefit from skilled OT services to address problems listed above and increase independence with ADLs.  Rehab is recommended upon d/c from acute care to further address deficits and help pt improve overall functional independence.        Rehab Prognosis:  Good; patient would benefit from acute skilled OT services to address these deficits and reach maximum level of function.       Plan:     Patient to be seen 4 x/week to address the above listed problems via self-care/home management, therapeutic activities, therapeutic exercises  · Plan of Care Expires:  05/14/22  · Plan of Care Reviewed with: patient    Subjective     Pain/Comfort:  · Pain Rating 1:  (pt indicated pain, but did not rate)  · Location - Side 1: Bilateral  · Location - Orientation 1: generalized  · Location 1: hip  · Pain Rating Post-Intervention 1:  (did not rate)    Objective:     Communicated with: RN (Smiley) prior to session.  Patient found up in chair with peripheral IV, chair check upon OT entry to room.    General Precautions: Standard, fall   Orthopedic Precautions:LLE partial weight bearing (progress to WBAT as pain improves per chart)   Braces: N/A  Respiratory Status: Room air     Occupational Performance:     Bed Mobility:    · Sit <> Supine: Min A  · Scooting: Total A posteriorly seated towards middle of bed; SBA-CGA seated towards edge of chair  · Increased time required for scooting in chair    Functional Mobility/Transfers:  · Sit <> Stand: Mod A, RW, with assist of 2 x 2 trials from chair  · Cues required for technique  · Stand pivot: Mod A, RW, with assist of 2 from chair <> bed    Activities of Daily Living:  · Grooming: stand by assistance for washing face with cloth while seated at EOB.   · Toileting:  Total A, RW for performing neela care in standing position after small bowel movement at chair level  · CGA required for standing balance      [facility] 6 Click ADL: 14    Treatment & Education:  *Session focused on promoting increased endurance, strength, balance, coordination, and ROM needed for ADLs and functional transfers as part of daily routine.   -Pt educated on role of OT in acute care setting.  -Pt performed sit <> stand transfer x 2 trials from chair; Increased time and cues required for technique  -Pt stood during neela care after bowel movement at bed level, then performed grooming task while seated at EOB  -Pt performed stand pivot transfer from chair <> bed  *POC reviewed with pt    Patient left HOB elevated with call button in reach, bed alarm on and RN (Smiley)  notifiedEducation:      GOALS:   Multidisciplinary Problems     Occupational Therapy Goals        Problem: Occupational Therapy    Goal Priority Disciplines Outcome Interventions   Occupational Therapy Goal     OT, PT/OT Ongoing, Progressing    Description: Goals to be met by: 4/28/22    Patient will increase functional independence with ADLs by performing:    LE Dressing with Minimal Assistance.  Grooming while standing at sink with Stand-by Assistance.  Toileting from bedside commode with Contact Guard Assistance for hygiene and clothing management.   Toilet transfer to bedside commode with Contact Guard Assistance.                     Time Tracking:     OT Date of Treatment: 04/19/22  OT Start Time: 1017  OT Stop Time: 1041  OT Total Time (min): 24 min    Billable Minutes:Self Care/Home Management 24   *Completed with PT    OT/MYNOR: OT          4/19/2022

## 2022-04-20 PROCEDURE — 99226 PR SUBSEQUENT OBSERVATION CARE,LEVEL III: ICD-10-PCS | Mod: ,,, | Performed by: HOSPITALIST

## 2022-04-20 PROCEDURE — 97530 THERAPEUTIC ACTIVITIES: CPT | Mod: CQ

## 2022-04-20 PROCEDURE — 63600175 PHARM REV CODE 636 W HCPCS: Performed by: INTERNAL MEDICINE

## 2022-04-20 PROCEDURE — G0378 HOSPITAL OBSERVATION PER HR: HCPCS

## 2022-04-20 PROCEDURE — 99226 PR SUBSEQUENT OBSERVATION CARE,LEVEL III: CPT | Mod: ,,, | Performed by: HOSPITALIST

## 2022-04-20 PROCEDURE — 94761 N-INVAS EAR/PLS OXIMETRY MLT: CPT

## 2022-04-20 PROCEDURE — 25000003 PHARM REV CODE 250: Performed by: INTERNAL MEDICINE

## 2022-04-20 PROCEDURE — 96372 THER/PROPH/DIAG INJ SC/IM: CPT | Performed by: INTERNAL MEDICINE

## 2022-04-20 PROCEDURE — 97535 SELF CARE MNGMENT TRAINING: CPT

## 2022-04-20 RX ADMIN — LEVOTHYROXINE SODIUM 50 MCG: 50 TABLET ORAL at 05:04

## 2022-04-20 RX ADMIN — OXYCODONE HYDROCHLORIDE AND ACETAMINOPHEN 500 MG: 500 TABLET ORAL at 09:04

## 2022-04-20 RX ADMIN — THERA TABS 1 TABLET: TAB at 09:04

## 2022-04-20 RX ADMIN — HEPARIN SODIUM 5000 UNITS: 5000 INJECTION INTRAVENOUS; SUBCUTANEOUS at 02:04

## 2022-04-20 RX ADMIN — HEPARIN SODIUM 5000 UNITS: 5000 INJECTION INTRAVENOUS; SUBCUTANEOUS at 10:04

## 2022-04-20 RX ADMIN — OXYBUTYNIN CHLORIDE 10 MG: 5 TABLET, EXTENDED RELEASE ORAL at 09:04

## 2022-04-20 RX ADMIN — FOLIC ACID 1 MG: 1 TABLET ORAL at 09:04

## 2022-04-20 RX ADMIN — ATORVASTATIN CALCIUM 10 MG: 10 TABLET, FILM COATED ORAL at 09:04

## 2022-04-20 RX ADMIN — Medication 1 TABLET: at 05:04

## 2022-04-20 RX ADMIN — ACETAMINOPHEN 650 MG: 325 TABLET, FILM COATED ORAL at 02:04

## 2022-04-20 RX ADMIN — Medication 1 TABLET: at 07:04

## 2022-04-20 RX ADMIN — HEPARIN SODIUM 5000 UNITS: 5000 INJECTION INTRAVENOUS; SUBCUTANEOUS at 05:04

## 2022-04-20 NOTE — PT/OT/SLP PROGRESS
Physical Therapy Treatment    Patient Name:  Shannan Medrano   MRN:  [id number]    Recommendations:     Discharge Recommendations:  rehabilitation facility   Discharge Equipment Recommendations: bedside commode, walker, rolling, wheelchair (deng size RW)   Barriers to discharge: Decreased caregiver support    Assessment:     Shannan Medrano is a 51 y.o. female admitted with a medical diagnosis of Pelvic ring fracture.  She presents with the following impairments/functional limitations:  weakness, impaired endurance, impaired self care skills, impaired functional mobilty, gait instability, impaired balance, impaired cognition, decreased lower extremity function, decreased upper extremity function, decreased safety awareness, pain, decreased coordination, orthopedic precautions ;pt with fair/good mobility today, limited by cognitive status /and pain, pt not wanting to ambulate further than the chair.    Rehab Prognosis: Good; patient would benefit from acute skilled PT services to address these deficits and reach maximum level of function.    Recent Surgery: * No surgery found *      Plan:     During this hospitalization, patient to be seen 6 x/week to address the identified rehab impairments via gait training, therapeutic activities, therapeutic exercises, neuromuscular re-education and progress toward the following goals:    · Plan of Care Expires:  05/14/22    Subjective     Chief Complaint: pain in hips/pelvic area. When asked about pain, pt pointing to both LE's/thighs.  Patient/Family Comments/goals: pt agreeable to session. Found soiled w/ BM, worked on bed mobility/rolling act's for hygiene purposes.   Pain/Comfort:  · Pain Rating 1:  (pt grimacing at times , gayle w/ bed mobility, though did not rate)  · Location - Side 1: Bilateral  · Location - Orientation 1: generalized  · Location 1: hip (legs)  · Pain Addressed 1: Reposition, Distraction, Nurse notified, Cessation of Activity  · Pain Rating Post-Intervention  1: 0/10 (pt did not appear to be in pain at rest)      Objective:     Communicated with nurse prior to session.  Patient found supine with peripheral IV, bed alarm, tobar catheter (catheter in lower abdomen) upon PT entry to room.     General Precautions: Standard, fall   Orthopedic Precautions:LLE partial weight bearing   Braces: N/A  Respiratory Status: Room air     Functional Mobility:  · Bed Mobility:     · Rolling Left:  moderate assistance, maximal assistance and of 1 persons  · Rolling Right: moderate assistance, maximal assistance and of 1 persons  · Supine to Sit: moderate assistance, maximal assistance and of 1 persons  · Transfers:     · Sit to Stand:  minimum assistance, moderate assistance and of 2 persons with rolling walker and max cueing for PWB on LLE  · Gait: pt took a few steps forward w/ RW and min.A x 2, though unable/unwilling to continue and pt trying to turn to get to chair,       AM-PAC 6 CLICK MOBILITY  Turning over in bed (including adjusting bedclothes, sheets and blankets)?: 3  Sitting down on and standing up from a chair with arms (e.g., wheelchair, bedside commode, etc.): 3  Moving from lying on back to sitting on the side of the bed?: 3  Moving to and from a bed to a chair (including a wheelchair)?: 3  Need to walk in hospital room?: 2  Climbing 3-5 steps with a railing?: 1  Basic Mobility Total Score: 15       Therapeutic Activities and Exercises:   OT present and working on ADL's (see OT note)  Pt perf'd a few LAQ's in chair.     Patient left up in chair, back supported w/ pillows, LE's supported w/ garbage can (2/2 pt very short stature) with all lines intact, call button in reach, chair alarm on and nurse notified.    GOALS:   Multidisciplinary Problems     Physical Therapy Goals        Problem: Physical Therapy    Goal Priority Disciplines Outcome Goal Variances Interventions   Physical Therapy Goal     PT, PT/OT Ongoing, Progressing     Description: Goals to be met by:  22    Patient will increase functional independence with mobility by performin. Supine<>sit with SBA without use of hospital bed features.   2. Sit<>stand with SBA with RW.  3. Gait x 50 feet with SBA with RW maintaining PWB of RLE.                   Time Tracking:     PT Received On: 22  PT Start Time: 1149     PT Stop Time: 1218  PT Total Time (min): 29 min     Billable Minutes: Therapeutic Activity 29    Treatment Type: Treatment  PT/PTA: PTA     PTA Visit Number: 1     2022   Ret'd [id number]  There Act. 10 min.  Pt sat up ~ 3hours today, t/f'd back to bed using RW and mod/maxA x 1, PWB on LLE, though unsure if pt following this precaution, (pt resisting a little, not following commands well, moaning in pain). Sit to supine SBA. Pt scooted up in bed using BUE's w/ SBA.   2022

## 2022-04-20 NOTE — NURSING
Physical therapy at bedside.  Pt had moderate soft brown BM.  Pericare performed per OT.  Therapy completed and patient placed in chair at bedside with chair alarm in place.

## 2022-04-20 NOTE — SUBJECTIVE & OBJECTIVE
Interval History: No acute events.    Review of Systems   Unable to perform ROS: Other (cognitive impairment)     Objective:     Vital Signs (Most Recent):  Temp: 98.2 °F (36.8 °C) (04/19/22 1946)  Pulse: 79 (04/19/22 2001)  Resp: 18 (04/19/22 2001)  BP: (!) 100/56 (04/19/22 1946)  SpO2: 97 % (04/19/22 2001)   Vital Signs (24h Range):  Temp:  [97.3 °F (36.3 °C)-98.6 °F (37 °C)] 98.2 °F (36.8 °C)  Pulse:  [59-80] 79  Resp:  [16-18] 18  SpO2:  [97 %-98 %] 97 %  BP: (100-139)/(56-73) 100/56     Weight: 44 kg (97 lb)  Body mass index is 18.94 kg/m².    Intake/Output Summary (Last 24 hours) at 4/19/2022 2122  Last data filed at 4/19/2022 1600  Gross per 24 hour   Intake 120 ml   Output 2150 ml   Net -2030 ml      Physical Exam  Constitutional:       General: She is not in acute distress.  HENT:      Head: Atraumatic.   Eyes:      Conjunctiva/sclera: Conjunctivae normal.   Cardiovascular:      Rate and Rhythm: Normal rate and regular rhythm.      Heart sounds: Normal heart sounds. No murmur heard.  Pulmonary:      Effort: Pulmonary effort is normal.      Breath sounds: Normal breath sounds. No wheezing.   Abdominal:      General: Bowel sounds are normal. There is no distension.      Palpations: Abdomen is soft.      Tenderness: There is no abdominal tenderness.   Genitourinary:     Comments: Suprapubic catheter in place with yellow color urine output.  Musculoskeletal:         General: No deformity. Normal range of motion.      Cervical back: Neck supple.   Neurological:      Mental Status: She is alert and oriented to person, place, and time.       Significant Labs: All pertinent labs within the past 24 hours have been reviewed.    Significant Imaging: I have reviewed all pertinent imaging results/findings within the past 24 hours.

## 2022-04-20 NOTE — PT/OT/SLP PROGRESS
Occupational Therapy   Treatment    Name: Shannan Medrano  MRN: [id number]  Admitting Diagnosis:  Pelvic ring fracture       Recommendations:     Discharge Recommendations: rehabilitation facility  Discharge Equipment Recommendations:  bedside commode, wheelchair (JR sized RW)  Barriers to discharge:   (current functional level)    Assessment:     Shannan Medrano is a 51 y.o. female with a medical diagnosis of Pelvic ring fracture.  She presents with pleasant affect. Performance deficits affecting function are weakness, impaired endurance, impaired self care skills, impaired functional mobilty, gait instability, impaired cognition, decreased lower extremity function, decreased upper extremity function, decreased safety awareness, decreased coordination, orthopedic precautions, impaired balance.  Pt agreeable to participating in therapy upon arrival to room.  While supine and in side lying position pt required Total A to perform hygiene care after bowel movement at bed level.  Pt able to don socks with SBA while seated at EOB.  Pt demonstrated improvement with sit <> stand transfer this date performing with Min A, RW-Mod A with assist of 2.  In addition, pt able to take steps with Min A, RW and assist of 2.  Pain noted with mobility on L side.        Overall, pt tolerated therapy well.  She would continue to benefit from skilled OT services to address problems listed above and increase independence with ADLs.  Rehab is recommended upon d/c from acute care to further address deficits and help pt improve overall functional independence.       Rehab Prognosis:  Good; patient would benefit from acute skilled OT services to address these deficits and reach maximum level of function.       Plan:     Patient to be seen 4 x/week to address the above listed problems via self-care/home management, therapeutic activities, therapeutic exercises  · Plan of Care Expires: 05/14/22  · Plan of Care Reviewed with: patient    Subjective      Pain/Comfort:  · Pain Rating 1: 0/10 (pt made facial grimaces with bed mobility and standing; unable to rate)  · Pain Rating Post-Intervention 1: 0/10  · Location - Side 2: Left  · Location - Orientation 2: generalized  · Location 2: hip    Objective:     Communicated with: RN Sarah) prior to session.  Patient found supine with peripheral IV, bed alarm, suprapubic catheter upon OT entry to room.  Pt found to be soiled with BM upon arrival to room.    General Precautions: Standard, fall , contact  Orthopedic Precautions:LLE partial weight bearing   Braces: N/A  Respiratory Status: Room air     Occupational Performance:     Bed Mobility:    · Supine <> sit: Mod A-Max A  · Scooting: Max A seated towards EOB    Functional Mobility/Transfers:  · Sit <> Stand: Min A-Mod A, RW (with assist of 2) x 1 trial from EOB  · Cues required for technique and orthopedic precautions  · Functional Mobility: Pt took steps from bed <> chair with Min A, RW and assist of 2.  · Some difficulty 2* pain observed    Activities of Daily Living:  · Lower Body Dressing: SBA for donning socks while seated at EOB.  · Toileting: Total A for performing hygiene care in side lying and supine position after bowel movement at bed level.      [facility] 6 Click ADL: 14    Treatment & Education:  *Session focused on promoting increased endurance, strength, balance, coordination, and ROM needed for ADLs and functional transfers as part of daily routine.   -Pt educated on role of OT in acute care setting.  -Pt performed multiple trials of rolling to allow for hygiene care to be performed after bowel movement at bed level.  Pt able to assist with maintaining position  -Pt performed sit <> stand transfer from EOB and took steps to chair  -Pt performed UB exercise: overhead press- 1 set x 10 reps  -POC reviewed      Patient left up in chair with all lines intact, call button in reach and chair alarm onEducation:  ; RN Sarah) notified    GOALS:    Multidisciplinary Problems     Occupational Therapy Goals        Problem: Occupational Therapy    Goal Priority Disciplines Outcome Interventions   Occupational Therapy Goal     OT, PT/OT Ongoing, Progressing    Description: Goals to be met by: 4/28/22    Patient will increase functional independence with ADLs by performing:    LE Dressing with Minimal Assistance.  Grooming while standing at sink with Stand-by Assistance.  Toileting from bedside commode with Contact Guard Assistance for hygiene and clothing management.   Toilet transfer to bedside commode with Contact Guard Assistance.                     Time Tracking:     OT Date of Treatment: 04/20/22  OT Start Time: 1150  OT Stop Time: 1218  OT Total Time (min): 28 min    Billable Minutes:Self Care/Home Management 28  *Completed with PTA    OT/MYNOR: OT          4/20/2022

## 2022-04-20 NOTE — PROGRESS NOTES
[facility] Medicine  Progress Note    Patient Name: Shannan Medrano  MRN: [id number]  Patient Class: OP- Observation   Admission Date: 4/12/2022  Length of Stay: 0 days  Attending Physician: Taran Davenport MD        Subjective:     Principal Problem:Pelvic ring fracture        HPI:  Ms. Shannna Medrano is a 51 y.o. female, with PMH of Down Syndrome, T2DM, HLD, CKD-3, Cardiomyopathy, urinary retention s/p suprapubic catheter placement, who [place] [facility] [place]/12/22 due to left hip & right buttocks pain after falling on Saturday. She further notes chronic lower abdominal pain. Per her caregiver's report, she fell down 4 steps. She states she has balance issues, but can walk, but has been unable to do so since her fall due to pain with bearing weight. She was evaluated in the ED with labs showing normal H&H, and Cr of 1.9. X-ray of the hips showed possible fracture of the right superior ramus. A CT of the pelvis showed a nondisplaced fracture of the right pubic ramus adjacent tot he symphysis. There was also a contralateral left sacral nondisplaced fracture of the pelvic ring. X-ray of the lumbar spine showed wedging of L1 vertebral body which may be remote or normal variant as well as Gr 1 anterolisthesis of L4 on L5. She was placed on OBS.       Overview/Hospital Course:  Patient presented after a recent fall with pain.  Her imaging showed right pubic ramus fracture and left pelvic ring fracture.  Ortho consulted and patient non-surgical.  Recommended partial WB on LLE.  Seen by PT and recommended for Rehab.      Interval History: No acute events.    Review of Systems   Unable to perform ROS: Other (cognitive impairment)     Objective:     Vital Signs (Most Recent):  Temp: 98.2 °F (36.8 °C) (04/19/22 1946)  Pulse: 79 (04/19/22 2001)  Resp: 18 (04/19/22 2001)  BP: (!) 100/56 (04/19/22 1946)  SpO2: 97 % (04/19/22 2001)   Vital Signs (24h Range):  Temp:  [97.3 °F (36.3 °C)-98.6 °F (37  °C)] 98.2 °F (36.8 °C)  Pulse:  [59-80] 79  Resp:  [16-18] 18  SpO2:  [97 %-98 %] 97 %  BP: (100-139)/(56-73) 100/56     Weight: 44 kg (97 lb)  Body mass index is 18.94 kg/m².    Intake/Output Summary (Last 24 hours) at 4/19/2022 2122  Last data filed at 4/19/2022 1600  Gross per 24 hour   Intake 120 ml   Output 2150 ml   Net -2030 ml      Physical Exam  Constitutional:       General: She is not in acute distress.  HENT:      Head: Atraumatic.   Eyes:      Conjunctiva/sclera: Conjunctivae normal.   Cardiovascular:      Rate and Rhythm: Normal rate and regular rhythm.      Heart sounds: Normal heart sounds. No murmur heard.  Pulmonary:      Effort: Pulmonary effort is normal.      Breath sounds: Normal breath sounds. No wheezing.   Abdominal:      General: Bowel sounds are normal. There is no distension.      Palpations: Abdomen is soft.      Tenderness: There is no abdominal tenderness.   Genitourinary:     Comments: Suprapubic catheter in place with yellow color urine output.  Musculoskeletal:         General: No deformity. Normal range of motion.      Cervical back: Neck supple.   Neurological:      Mental Status: She is alert and oriented to person, place, and time.       Significant Labs: All pertinent labs within the past 24 hours have been reviewed.    Significant Imaging: I have reviewed all pertinent imaging results/findings within the past 24 hours.      Assessment/Plan:      * Pelvic ring fracture and Pubic Ramus Fracture  Patient presented after a recent fall with pain.  Her imaging showed right pubic ramus fracture and left pelvic ring fracture.  Ortho consulted and patient non-surgical.  Recommended partial WB on LLE.  -PRN pain meds  -PT consulted - recommends rehab  -DVT prophylaxis with SQH for now      HLD (hyperlipidemia)  -Continue Atorvastatin 10 mg QD       CKD (chronic kidney disease) stage 3, GFR 30-59 ml/min  Cr is 1.9 on admission, which is better than recent measures.  Stable at 1.7.  -  avoid nephrotoxins   - renally dose meds       Type 2 diabetes mellitus  Home Meds:  Metformin 500mg  A1C 5.1 on admission  Glucose well controlled  - Diabetic diet   - will discontinues SSI with accuchecks AC/HS        Down syndrome  Noted      VTE Risk Mitigation (From admission, onward)         Ordered     heparin (porcine) injection 5,000 Units  Every 8 hours         04/13/22 1523     IP VTE LOW RISK PATIENT  Once         04/12/22 2327     Place sequential compression device  Until discontinued         04/12/22 2327              Taran Davenport MD  Department of Hospital Medicine   [facility] ([facility])

## 2022-04-20 NOTE — PLAN OF CARE
Problem: Adult Inpatient Plan of Care  Goal: Plan of Care Review  Outcome: Ongoing, Progressing  Goal: Optimal Comfort and Wellbeing  Outcome: Ongoing, Progressing     Problem: Fall Injury Risk  Goal: Absence of Fall and Fall-Related Injury  Outcome: Ongoing, Progressing     Problem: Skin Injury Risk Increased  Goal: Skin Health and Integrity  Outcome: Ongoing, Progressing     Problem: Infection  Goal: Absence of Infection Signs and Symptoms  Outcome: Ongoing, Progressing   NAEON. Watching TV, no signs of distress. RR even and nonlabored. SR up x 2, call light in reach, bed in lowest position and alarm in place.

## 2022-04-20 NOTE — PLAN OF CARE
CM spoke to [facility], and provided names and numbers of pt's , Letha, and her sponsor, Josh Perez, [phone]. [facility] will follow-up with obtaining paperwork.    CM to follow for plans and arrangemetns.   04/20/22 1153   Discharge Reassessment   Assessment Type Discharge Planning Reassessment   Did the patient's condition or plan change since previous assessment? No   Discharge Plan discussed with: Patient   Communicated COMPA with patient/caregiver Date not available/Unable to determine   Discharge Plan A Rehab   Discharge Plan B Rehab

## 2022-04-21 PROCEDURE — G0378 HOSPITAL OBSERVATION PER HR: HCPCS

## 2022-04-21 PROCEDURE — 94761 N-INVAS EAR/PLS OXIMETRY MLT: CPT

## 2022-04-21 PROCEDURE — 96372 THER/PROPH/DIAG INJ SC/IM: CPT | Performed by: INTERNAL MEDICINE

## 2022-04-21 PROCEDURE — 97116 GAIT TRAINING THERAPY: CPT

## 2022-04-21 PROCEDURE — 97535 SELF CARE MNGMENT TRAINING: CPT

## 2022-04-21 PROCEDURE — 63600175 PHARM REV CODE 636 W HCPCS: Performed by: INTERNAL MEDICINE

## 2022-04-21 PROCEDURE — 97530 THERAPEUTIC ACTIVITIES: CPT

## 2022-04-21 PROCEDURE — 99226 PR SUBSEQUENT OBSERVATION CARE,LEVEL III: CPT | Mod: ,,, | Performed by: HOSPITALIST

## 2022-04-21 PROCEDURE — 99226 PR SUBSEQUENT OBSERVATION CARE,LEVEL III: ICD-10-PCS | Mod: ,,, | Performed by: HOSPITALIST

## 2022-04-21 PROCEDURE — 25000003 PHARM REV CODE 250: Performed by: INTERNAL MEDICINE

## 2022-04-21 PROCEDURE — 25000003 PHARM REV CODE 250: Performed by: EMERGENCY MEDICINE

## 2022-04-21 RX ADMIN — Medication 1 TABLET: at 08:04

## 2022-04-21 RX ADMIN — LEVOTHYROXINE SODIUM 50 MCG: 50 TABLET ORAL at 05:04

## 2022-04-21 RX ADMIN — Medication 1 TABLET: at 05:04

## 2022-04-21 RX ADMIN — THERA TABS 1 TABLET: TAB at 08:04

## 2022-04-21 RX ADMIN — OXYCODONE HYDROCHLORIDE AND ACETAMINOPHEN 500 MG: 500 TABLET ORAL at 08:04

## 2022-04-21 RX ADMIN — HEPARIN SODIUM 5000 UNITS: 5000 INJECTION INTRAVENOUS; SUBCUTANEOUS at 09:04

## 2022-04-21 RX ADMIN — HEPARIN SODIUM 5000 UNITS: 5000 INJECTION INTRAVENOUS; SUBCUTANEOUS at 02:04

## 2022-04-21 RX ADMIN — FOLIC ACID 1 MG: 1 TABLET ORAL at 08:04

## 2022-04-21 RX ADMIN — TRAMADOL HYDROCHLORIDE 50 MG: 50 TABLET, COATED ORAL at 11:04

## 2022-04-21 RX ADMIN — ATORVASTATIN CALCIUM 10 MG: 10 TABLET, FILM COATED ORAL at 08:04

## 2022-04-21 RX ADMIN — Medication 6 MG: at 09:04

## 2022-04-21 RX ADMIN — OXYBUTYNIN CHLORIDE 10 MG: 5 TABLET, EXTENDED RELEASE ORAL at 08:04

## 2022-04-21 RX ADMIN — ACETAMINOPHEN 650 MG: 325 TABLET, FILM COATED ORAL at 10:04

## 2022-04-21 RX ADMIN — HEPARIN SODIUM 5000 UNITS: 5000 INJECTION INTRAVENOUS; SUBCUTANEOUS at 05:04

## 2022-04-21 NOTE — SUBJECTIVE & OBJECTIVE
Interval History: No acute events.    Review of Systems   Unable to perform ROS: Other (cognitive impairment)     Objective:     Vital Signs (Most Recent):  Temp: 97.4 °F (36.3 °C) (04/21/22 1218)  Pulse: 92 (04/21/22 1218)  Resp: 20 (04/21/22 1218)  BP: (!) 110/55 (04/21/22 1218)  SpO2: 98 % (04/21/22 1218)   Vital Signs (24h Range):  Temp:  [97.4 °F (36.3 °C)-98.5 °F (36.9 °C)] 97.4 °F (36.3 °C)  Pulse:  [74-92] 92  Resp:  [16-20] 20  SpO2:  [98 %-100 %] 98 %  BP: (110-139)/(55-89) 110/55     Weight: 44 kg (97 lb)  Body mass index is 18.94 kg/m².    Intake/Output Summary (Last 24 hours) at 4/21/2022 1602  Last data filed at 4/21/2022 0523  Gross per 24 hour   Intake 600 ml   Output 500 ml   Net 100 ml        Physical Exam  Constitutional:       General: She is not in acute distress.  HENT:      Head: Atraumatic.   Eyes:      Conjunctiva/sclera: Conjunctivae normal.   Cardiovascular:      Rate and Rhythm: Normal rate and regular rhythm.      Heart sounds: Normal heart sounds. No murmur heard.  Pulmonary:      Effort: Pulmonary effort is normal.      Breath sounds: Normal breath sounds. No wheezing.   Abdominal:      General: Bowel sounds are normal. There is no distension.      Palpations: Abdomen is soft.      Tenderness: There is no abdominal tenderness.   Genitourinary:     Comments: Suprapubic catheter in place with yellow color urine output.  Musculoskeletal:         General: No deformity. Normal range of motion.      Cervical back: Neck supple.   Neurological:      Mental Status: She is alert and oriented to person, place, and time.       Significant Labs: All pertinent labs within the past 24 hours have been reviewed.    Significant Imaging: I have reviewed all pertinent imaging results/findings within the past 24 hours.

## 2022-04-21 NOTE — PT/OT/SLP PROGRESS
Physical Therapy Treatment    Patient Name:  Shannan Medrano   MRN:  [id number]    Recommendations:     Discharge Recommendations:  rehabilitation facility   Discharge Equipment Recommendations: bedside commode, walker, rolling, wheelchair (deng size RW)   Barriers to discharge: Decreased caregiver support    Assessment:     Shannan Medrano is a 51 y.o. female admitted with a medical diagnosis of Pelvic ring fracture s/p fall down stairs ( Pubic ramus fracture, right, closed, initial encounter and sacral ala fracture L). Pmhx significant for Down syndrome, DM-T2, CKD-3, cardiomyopathy, urinary retention s/p suprapubic catheter, osteopenia.  She presents with the following impairments/functional limitations:  weakness, impaired self care skills, impaired functional mobilty, gait instability, impaired balance, impaired cognition, decreased coordination, decreased lower extremity function, decreased safety awareness, pain, decreased ROM .    Patient continues to have significant pain with standing and attempted gait training. Pt with improved pain with pivoting and attempts to pivot despite chair being placed >3 ft away requiring gait bout and therefore requiring increased assistance to prevent fall. Rec for dc to IRF.    Prior to admission pt was independent with mobility and there is expectation of returning to prior level of function to maintain independence avoiding readmission. Pt is at high risk of unplanned readmission due to fall risk and inability ambulate functional distances d/t pain. The lower level of care cannot provide total interdisciplinary approach needed. Pt is able to tolerate 3 hours of daily therapy. Pt is pleasant and motivated to return to prior level of function.     Rehab Prognosis: Good; patient would benefit from acute skilled PT services to address these deficits and reach maximum level of function.    Recent Surgery: * No surgery found *      Plan:     During this hospitalization, patient to  be seen 6 x/week to address the identified rehab impairments via gait training, therapeutic activities, therapeutic exercises, neuromuscular re-education and progress toward the following goals:    · Plan of Care Expires:  05/14/22    Subjective     Chief Complaint: pain in hips/pelvic area, needing to use bathroom  Patient/Family Comments/goals: Nonverbal request to not have hair combed despite knots forming; Patient agreeable to PT treatment.  Pain/Comfort:  Pain Rating 1: 0/10 (at rest)  Location - Side 1: Bilateral  Location 1: hip  Pain Addressed 1: Reposition, Distraction, Cessation of Activity  Pain Rating Post-Intervention 1:  (increased pain with standing)      Objective:     Communicated with nurse prior to session.  Patient found supine with peripheral IV, bed alarm (suprapubic catheter) upon PT entry to room.     General Precautions: Standard, fall   Orthopedic Precautions:LLE partial weight bearing   Braces: N/A  Respiratory Status: Room air     Patient donned yellow socks and gait belt for OOB mobility.    Functional Mobility:  · Bed Mobility:     · Supine to Sit: moderate assistance and of 2 persons  · Increased assistance d/t difficulty with command following  · Transfers:     · Sit to Stand:  minimum assistance, moderate assistance and of 2 persons with rolling walker  · From EOB and BSC  · Toilet Transfer: minimum assistance and of 2 persons with  rolling walker  using  Stand Pivot  · Gait: x3 ft with RW and mod-maxA x1 person.   · Pt with tendency to maintain B stance support to limit pain, attempts to pivot/swing legs thru RW to achieve excusion requiring maxA to maitnain balance/prevent fall.    · Multiple pivots to reach chair, attempts to sit prior to safe positioning in front of chair      AM-PAC 6 CLICK MOBILITY  Turning over in bed (including adjusting bedclothes, sheets and blankets)?: 3  Sitting down on and standing up from a chair with arms (e.g., wheelchair, bedside commode, etc.):  2  Moving from lying on back to sitting on the side of the bed?: 2  Moving to and from a bed to a chair (including a wheelchair)?: 2  Need to walk in hospital room?: 2  Climbing 3-5 steps with a railing?: 1  Basic Mobility Total Score: 12       Therapeutic Activities and Exercises:    Patient left up in chair with all lines intact, call button in reach and chair alarm on.    GOALS:   Multidisciplinary Problems     Physical Therapy Goals        Problem: Physical Therapy    Goal Priority Disciplines Outcome Goal Variances Interventions   Physical Therapy Goal     PT, PT/OT Ongoing, Progressing     Description: Goals to be met by: 22    Patient will increase functional independence with mobility by performin. Supine<>sit with SBA without use of hospital bed features.   2. Sit<>stand with SBA with RW.  3. Gait x 50 feet with SBA with RW maintaining PWB of RLE.                   Time Tracking:     PT Received On: 22  PT Start Time: 1313     PT Stop Time: 1345  PT Total Time (min): 32 min     Overlap with OT for portions of session due to complex nature of patient and for safety with mobility to decrease fall risk for patient and caregiver injury requiring two skilled therapists to provide interventions.     Billable Minutes: Gait Training 16 and Therapeutic Activity 10    Treatment Type: Treatment  PT/PTA: PT     PTA Visit Number: 0     2022

## 2022-04-21 NOTE — SUBJECTIVE & OBJECTIVE
Interval History: No acute events.    Review of Systems   Unable to perform ROS: Other (cognitive impairment)     Objective:     Vital Signs (Most Recent):  Temp: 97.4 °F (36.3 °C) (04/20/22 1941)  Pulse: 75 (04/20/22 1941)  Resp: 16 (04/20/22 1941)  BP: (!) 111/59 (04/20/22 1941)  SpO2: 98 % (04/20/22 1941)   Vital Signs (24h Range):  Temp:  [97.4 °F (36.3 °C)-98.4 °F (36.9 °C)] 97.4 °F (36.3 °C)  Pulse:  [60-90] 75  Resp:  [16-18] 16  SpO2:  [93 %-100 %] 98 %  BP: (104-113)/(52-74) 111/59     Weight: 44 kg (97 lb)  Body mass index is 18.94 kg/m².    Intake/Output Summary (Last 24 hours) at 4/20/2022 2145  Last data filed at 4/20/2022 1600  Gross per 24 hour   Intake 340 ml   Output 1200 ml   Net -860 ml        Physical Exam  Constitutional:       General: She is not in acute distress.  HENT:      Head: Atraumatic.   Eyes:      Conjunctiva/sclera: Conjunctivae normal.   Cardiovascular:      Rate and Rhythm: Normal rate and regular rhythm.      Heart sounds: Normal heart sounds. No murmur heard.  Pulmonary:      Effort: Pulmonary effort is normal.      Breath sounds: Normal breath sounds. No wheezing.   Abdominal:      General: Bowel sounds are normal. There is no distension.      Palpations: Abdomen is soft.      Tenderness: There is no abdominal tenderness.   Genitourinary:     Comments: Suprapubic catheter in place with yellow color urine output.  Musculoskeletal:         General: No deformity. Normal range of motion.      Cervical back: Neck supple.   Neurological:      Mental Status: She is alert and oriented to person, place, and time.       Significant Labs: All pertinent labs within the past 24 hours have been reviewed.    Significant Imaging: I have reviewed all pertinent imaging results/findings within the past 24 hours.

## 2022-04-21 NOTE — PROGRESS NOTES
[facility] Surg [facility] Medicine  Progress Note    Patient Name: Shannan Medrano  MRN: [id number]  Patient Class: OP- Observation   Admission Date: 4/12/2022  Length of Stay: 0 days  Attending Physician: Taran Davenport MD  Primary Care Provider: Primary Doctor No        Subjective:     Principal Problem:Pelvic ring fracture        HPI:  Ms. Shannan Medrano is a 51 y.o. female, with PMH of Down Syndrome, T2DM, HLD, CKD-3, Cardiomyopathy, urinary retention s/p suprapubic catheter placement, who presented to [facility] ED on 4/12/22 due to left hip & right buttocks pain after falling on Saturday. She further notes chronic lower abdominal pain. Per her caregiver's report, she fell down 4 steps. She states she has balance issues, but can walk, but has been unable to do so since her fall due to pain with bearing weight. She was evaluated in the ED with labs showing normal H&H, and Cr of 1.9. X-ray of the hips showed possible fracture of the right superior ramus. A CT of the pelvis showed a nondisplaced fracture of the right pubic ramus adjacent tot he symphysis. There was also a contralateral left sacral nondisplaced fracture of the pelvic ring. X-ray of the lumbar spine showed wedging of L1 vertebral body which may be remote or normal variant as well as Gr 1 anterolisthesis of L4 on L5. She was placed on OBS.       Overview/Hospital Course:  Patient presented after a recent fall with pain.  Her imaging showed right pubic ramus fracture and left pelvic ring fracture.  Ortho consulted and patient non-surgical.  Recommended partial WB on LLE.  Seen by PT and recommended for Rehab.      Interval History: No acute events.    Review of Systems   Unable to perform ROS: Other (cognitive impairment)     Objective:     Vital Signs (Most Recent):  Temp: 97.4 °F (36.3 °C) (04/20/22 1941)  Pulse: 75 (04/20/22 1941)  Resp: 16 (04/20/22 1941)  BP: (!) 111/59 (04/20/22 1941)  SpO2: 98 % (04/20/22 1941)   Vital Signs (24h  Range):  Temp:  [97.4 °F (36.3 °C)-98.4 °F (36.9 °C)] 97.4 °F (36.3 °C)  Pulse:  [60-90] 75  Resp:  [16-18] 16  SpO2:  [93 %-100 %] 98 %  BP: (104-113)/(52-74) 111/59     Weight: 44 kg (97 lb)  Body mass index is 18.94 kg/m².    Intake/Output Summary (Last 24 hours) at 4/20/2022 2145  Last data filed at 4/20/2022 1600  Gross per 24 hour   Intake 340 ml   Output 1200 ml   Net -860 ml        Physical Exam  Constitutional:       General: She is not in acute distress.  HENT:      Head: Atraumatic.   Eyes:      Conjunctiva/sclera: Conjunctivae normal.   Cardiovascular:      Rate and Rhythm: Normal rate and regular rhythm.      Heart sounds: Normal heart sounds. No murmur heard.  Pulmonary:      Effort: Pulmonary effort is normal.      Breath sounds: Normal breath sounds. No wheezing.   Abdominal:      General: Bowel sounds are normal. There is no distension.      Palpations: Abdomen is soft.      Tenderness: There is no abdominal tenderness.   Genitourinary:     Comments: Suprapubic catheter in place with yellow color urine output.  Musculoskeletal:         General: No deformity. Normal range of motion.      Cervical back: Neck supple.   Neurological:      Mental Status: She is alert and oriented to person, place, and time.       Significant Labs: All pertinent labs within the past 24 hours have been reviewed.    Significant Imaging: I have reviewed all pertinent imaging results/findings within the past 24 hours.      Assessment/Plan:      * Pelvic ring fracture and Pubic Ramus Fracture  Patient presented after a recent fall with pain.  Her imaging showed right pubic ramus fracture and left pelvic ring fracture.  Ortho consulted and patient non-surgical.  Recommended partial WB on LLE.  -PRN pain meds  -PT consulted - recommends rehab  -DVT prophylaxis with SQH for now      HLD (hyperlipidemia)  -Continue Atorvastatin 10 mg QD       CKD (chronic kidney disease) stage 3, GFR 30-59 ml/min  Cr is 1.9 on admission, which is  better than recent measures.  Stable at 1.7.  - avoid nephrotoxins   - renally dose meds       Type 2 diabetes mellitus  Home Meds:  Metformin 500mg  A1C 5.1 on admission  Glucose well controlled  - Diabetic diet   - will discontinues SSI with accuchecks AC/HS        Down syndrome  Noted      VTE Risk Mitigation (From admission, onward)         Ordered     heparin (porcine) injection 5,000 Units  Every 8 hours         04/13/22 1523     IP VTE LOW RISK PATIENT  Once         04/12/22 2328     Place sequential compression device  Until discontinued         04/12/22 4451                Taran Davenport MD  Department of Hospital Medicine   [facility] - Med Surg ([facility])

## 2022-04-21 NOTE — PLAN OF CARE
CM spoke to Letha, at [facility]. Josh with [facility] will be meeting with Letha in 15 or 20 minutes. [facility] states she will get the POA form and either fax or email to this CM ;   04/21/22 1024   Discharge Reassessment   Assessment Type Discharge Planning Reassessment   Discharge Plan discussed with: Patient;Caregiver   Communicated COMPA with patient/caregiver Date not available/Unable to determine   Discharge Plan A Rehab   Discharge Plan B Rehab   DME Needed Upon Discharge  none   Why the patient remains in the hospital Social issues

## 2022-04-21 NOTE — PLAN OF CARE
BART spoke to Nuzhat at \[facility]\"" rehab, this facility is not able to take pt without POA form. Nuzhat spoke to pt's , Letha, [phone], and to Josh, with [facility].     At this time, neither are able to provide paperwork for POA, BART and [facility] will continue to try to determine who has POA for this lady.    CM to follow for plans and arrangemetns.   04/21/22 1024   Discharge Reassessment   Assessment Type Discharge Planning Reassessment   Discharge Plan discussed with: Patient;Caregiver   Communicated COMPA with patient/caregiver Date not available/Unable to determine   Discharge Plan A Rehab   Discharge Plan B Rehab

## 2022-04-21 NOTE — PT/OT/SLP PROGRESS
Occupational Therapy   Treatment    Name: Shannan Medrano  MRN: [id number]  Admitting Diagnosis:  Pelvic ring fracture       Recommendations:     Discharge Recommendations: rehabilitation facility  Discharge Equipment Recommendations:  bedside commode, wheelchair (JR sized RW)  Barriers to discharge:   (current functional level)    Assessment:     Shannan Medrano is a 51 y.o. female with a medical diagnosis of Pelvic ring fracture.  She presents with pleasant affect. Performance deficits affecting function are weakness, impaired self care skills, impaired functional mobilty, gait instability, impaired balance, decreased coordination, decreased lower extremity function, decreased safety awareness, pain, decreased ROM.  Pt agreeable to participating in therapy upon arrival to room.  Pt demonstrated improvement with toilet transfer performing with Mod A, RW.  While standing pt required Total A, RW to peform neela care after bowel movement from [facility].  Pt able to maintain standing balance with CGA, RW.  Difficulty taking steps noted with pt required Max A, RW to mobilize from BSC <> chair.     Overall, pt tolerated therapy well.  She would continue to benefit from skilled OT services to address problems listed above and increase independence with ADLs.  Rehab is recommended upon d/c from acute care to further address deficits and help pt improve overall functional independence.       Rehab Prognosis:  Good; patient would benefit from acute skilled OT services to address these deficits and reach maximum level of function.       Plan:     Patient to be seen 5 x/week to address the above listed problems via self-care/home management, therapeutic activities, therapeutic exercises  · Plan of Care Expires: 05/14/22  · Plan of Care Reviewed with: patient    Subjective     Pt used the sign for toilet to indicate she needed to use restroom.    Pain/Comfort:  · Pain Rating 1: 0/10  · Pain Rating Post-Intervention 1:  (pt indicated pain  with standing, transfers, and taking steps; unable to rate)    Objective:     Communicated with: RN prior to session.  Patient found HOB elevated with peripheral IV, bed alarm, suprapubic catheter upon OT entry to room.    General Precautions: Standard, contact   Orthopedic Precautions:LLE partial weight bearing   Braces: N/A  Respiratory Status: Room air     Occupational Performance:     Bed Mobility:    · Supine <> Sit: Mod A  · Scooting: Mod A seated towards EOB  · Sit <> Supine: Mod A    Functional Mobility/Transfers:  · Sit <> Stand:   · Mod A, RW x 1 trial from EOB with assist of 2  · Mod A, RW x 2 trials from BSC with assist of 1  · Mod A, RW x 1 trial from BSC with assist of 2  · Toilet transfer: Mod A, RW taking steps from bed <> BSC  · Functional Mobility: Pt mobilized 3 ft with Max A, RW and assist of 2.  · Pt attempted to take steps, but unable to fully complete action  · Pt pivoted from BSC <> chair  · Difficulty maneuvering RW and maintaining balance noted    Activities of Daily Living:  · Grooming: Total A for combing hair while seated on BSC.  · Visual model provided; however, pt did not initiate completing task  · Upper Body Dressing: Max A for doffing/donning gown while seated on BSC.  · Lower Body Dressing: Total A for donning socks while supine with HOB elevated.  · Toileting: Total A, RW for performing neela care in standing position after bowel movement from BSC.  · Pt able to maintain balance with CGA, RW.  · Pt required seated rest break during task 2* pain while standing      [facility] 6 Click ADL: 14    Treatment & Education:  *Session focused on promoting increased endurance, strength, balance, coordination, and ROM needed for ADLs and functional transfers as part of daily routine.   -Pt educated on role of OT in acute care setting  -Pt performed sit <> stand transfer x 1 trial from EOB and 3 trials from BSC  -Pt performed step transfer to BSC; cues required for RW management and technique  -Pt  performed toileting from BSC  -Pt took steps from BSC <> chair; cues required for technique and RW management  -POC reviewed with pt      Patient left up in chair with all lines intact and call button in reachEducation:  ; attempted to call RN; unable to reach  *Pt ok to t/f from chair <> bed with RN via stand pivot with chair placed right next to bed; Min A, RW    GOALS:   Multidisciplinary Problems     Occupational Therapy Goals        Problem: Occupational Therapy    Goal Priority Disciplines Outcome Interventions   Occupational Therapy Goal     OT, PT/OT Ongoing, Progressing    Description: Goals to be met by: 4/28/22    Patient will increase functional independence with ADLs by performing:    LE Dressing with Minimal Assistance.  Grooming while standing at sink with Stand-by Assistance.  Toileting from bedside commode with Contact Guard Assistance for hygiene and clothing management.   Toilet transfer to bedside commode with Contact Guard Assistance.                     Time Tracking:     OT Date of Treatment: 04/21/22  OT Start Time: 1313  OT Stop Time: 1345  OT Total Time (min): 32 min    Billable Minutes:Self Care/Home Management 32   *Co-tx with PT    OT/MYNOR: OT          4/21/2022

## 2022-04-21 NOTE — PLAN OF CARE
Problem: Adult Inpatient Plan of Care  Goal: Plan of Care Review  Outcome: Ongoing, Progressing  Goal: Optimal Comfort and Wellbeing  Outcome: Ongoing, Progressing     Problem: Fall Injury Risk  Goal: Absence of Fall and Fall-Related Injury  Outcome: Ongoing, Progressing     Problem: Skin Injury Risk Increased  Goal: Skin Health and Integrity  Outcome: Ongoing, Progressing   NAEON.  Pt educated multiple times of fall risk and inability to bear weight on left side. Bms noted this shift. Pink cloudy urine noted to suprapubic catheter previously yellow and cloudy. Catheter site intact, redness or inflammation noted. SR up x 2, bed alarm in place, call light in reach. No distress noted at this time,pt resting in bed with stuffed animal watching TV. TM

## 2022-04-21 NOTE — PROGRESS NOTES
[facility] Surg [facility] Medicine  Progress Note    Patient Name: Shannan Medrano  MRN: [id number]  Patient Class: OP- Observation   Admission Date: 4/12/2022  Length of Stay: 0 days  Attending Physician: Taran Davenport MD        Subjective:     Principal Problem:Pelvic ring fracture        HPI:  Ms. Shannan Medrano is a 51 y.o. female, with PMH of Down Syndrome, T2DM, HLD, CKD-3, Cardiomyopathy, urinary retention s/p suprapubic catheter placement, who [place] [facility] [place] due to left hip & right buttocks pain after falling on Saturday. She further notes chronic lower abdominal pain. Per her caregiver's report, she fell down 4 steps. She states she has balance issues, but can walk, but has been unable to do so since her fall due to pain with bearing weight. She was evaluated in the ED with labs showing normal H&H, and Cr of 1.9. X-ray of the hips showed possible fracture of the right superior ramus. A CT of the pelvis showed a nondisplaced fracture of the right pubic ramus adjacent tot he symphysis. There was also a contralateral left sacral nondisplaced fracture of the pelvic ring. X-ray of the lumbar spine showed wedging of L1 vertebral body which may be remote or normal variant as well as Gr 1 anterolisthesis of L4 on L5. She was placed on OBS.       Overview/Hospital Course:  Patient presented after a recent fall with pain.  Her imaging showed right pubic ramus fracture and left pelvic ring fracture.  Ortho consulted and patient non-surgical.  Recommended partial WB on LLE.  Seen by PT and recommended for Rehab.      Interval History: No acute events.    Review of Systems   Unable to perform ROS: Other (cognitive impairment)     Objective:     Vital Signs (Most Recent):  Temp: 97.4 °F (36.3 °C) (04/21/22 1218)  Pulse: 92 (04/21/22 1218)  Resp: 20 (04/21/22 1218)  BP: (!) 110/55 (04/21/22 1218)  SpO2: 98 % (04/21/22 1218)   Vital Signs (24h Range):  Temp:  [97.4 °F (36.3 °C)-98.5 °F  (36.9 °C)] 97.4 °F (36.3 °C)  Pulse:  [74-92] 92  Resp:  [16-20] 20  SpO2:  [98 %-100 %] 98 %  BP: (110-139)/(55-89) 110/55     Weight: 44 kg (97 lb)  Body mass index is 18.94 kg/m².    Intake/Output Summary (Last 24 hours) at 4/21/2022 1602  Last data filed at 4/21/2022 0523  Gross per 24 hour   Intake 600 ml   Output 500 ml   Net 100 ml        Physical Exam  Constitutional:       General: She is not in acute distress.  HENT:      Head: Atraumatic.   Eyes:      Conjunctiva/sclera: Conjunctivae normal.   Cardiovascular:      Rate and Rhythm: Normal rate and regular rhythm.      Heart sounds: Normal heart sounds. No murmur heard.  Pulmonary:      Effort: Pulmonary effort is normal.      Breath sounds: Normal breath sounds. No wheezing.   Abdominal:      General: Bowel sounds are normal. There is no distension.      Palpations: Abdomen is soft.      Tenderness: There is no abdominal tenderness.   Genitourinary:     Comments: Suprapubic catheter in place with yellow color urine output.  Musculoskeletal:         General: No deformity. Normal range of motion.      Cervical back: Neck supple.   Neurological:      Mental Status: She is alert and oriented to person, place, and time.       Significant Labs: All pertinent labs within the past 24 hours have been reviewed.    Significant Imaging: I have reviewed all pertinent imaging results/findings within the past 24 hours.      Assessment/Plan:      * Pelvic ring fracture and Pubic Ramus Fracture  Patient presented after a recent fall with pain.  Her imaging showed right pubic ramus fracture and left pelvic ring fracture.  Ortho consulted and patient non-surgical.  Recommended partial WB on LLE.  -PRN pain meds  -PT consulted - recommends rehab  -DVT prophylaxis with SQH for now      HLD (hyperlipidemia)  -Continue Atorvastatin 10 mg QD       CKD (chronic kidney disease) stage 3, GFR 30-59 ml/min  Cr is 1.9 on admission, which is better than recent measures.  Stable at  1.7.  - avoid nephrotoxins   - renally dose meds       Type 2 diabetes mellitus  Home Meds:  Metformin 500mg  A1C 5.1 on admission  Glucose well controlled  - Diabetic diet   - will discontinues SSI with accuchecks AC/HS        Down syndrome  Noted      VTE Risk Mitigation (From admission, onward)         Ordered     heparin (porcine) injection 5,000 Units  Every 8 hours         04/13/22 1523     IP VTE LOW RISK PATIENT  Once         04/12/22 2327     Place sequential compression device  Until discontinued         04/12/22 2327                Taran Davenport MD  Department of Hospital Medicine   [facility] - Med Surg ([facility]

## 2022-04-22 LAB — POCT GLUCOSE: 214 MG/DL (ref 70–110)

## 2022-04-22 PROCEDURE — 96372 THER/PROPH/DIAG INJ SC/IM: CPT | Performed by: INTERNAL MEDICINE

## 2022-04-22 PROCEDURE — 97116 GAIT TRAINING THERAPY: CPT

## 2022-04-22 PROCEDURE — G0378 HOSPITAL OBSERVATION PER HR: HCPCS

## 2022-04-22 PROCEDURE — 63600175 PHARM REV CODE 636 W HCPCS: Performed by: INTERNAL MEDICINE

## 2022-04-22 PROCEDURE — 94761 N-INVAS EAR/PLS OXIMETRY MLT: CPT

## 2022-04-22 PROCEDURE — 99225 PR SUBSEQUENT OBSERVATION CARE,LEVEL II: CPT | Mod: ,,, | Performed by: HOSPITALIST

## 2022-04-22 PROCEDURE — 25000003 PHARM REV CODE 250: Performed by: INTERNAL MEDICINE

## 2022-04-22 PROCEDURE — 99225 PR SUBSEQUENT OBSERVATION CARE,LEVEL II: ICD-10-PCS | Mod: ,,, | Performed by: HOSPITALIST

## 2022-04-22 PROCEDURE — 97535 SELF CARE MNGMENT TRAINING: CPT

## 2022-04-22 RX ADMIN — HEPARIN SODIUM 5000 UNITS: 5000 INJECTION INTRAVENOUS; SUBCUTANEOUS at 02:04

## 2022-04-22 RX ADMIN — THERA TABS 1 TABLET: TAB at 09:04

## 2022-04-22 RX ADMIN — LEVOTHYROXINE SODIUM 50 MCG: 50 TABLET ORAL at 06:04

## 2022-04-22 RX ADMIN — HEPARIN SODIUM 5000 UNITS: 5000 INJECTION INTRAVENOUS; SUBCUTANEOUS at 10:04

## 2022-04-22 RX ADMIN — TRAMADOL HYDROCHLORIDE 50 MG: 50 TABLET, COATED ORAL at 10:04

## 2022-04-22 RX ADMIN — HEPARIN SODIUM 5000 UNITS: 5000 INJECTION INTRAVENOUS; SUBCUTANEOUS at 06:04

## 2022-04-22 RX ADMIN — Medication 1 TABLET: at 05:04

## 2022-04-22 RX ADMIN — Medication 1 TABLET: at 07:04

## 2022-04-22 RX ADMIN — OXYBUTYNIN CHLORIDE 10 MG: 5 TABLET, EXTENDED RELEASE ORAL at 09:04

## 2022-04-22 RX ADMIN — FOLIC ACID 1 MG: 1 TABLET ORAL at 09:04

## 2022-04-22 RX ADMIN — OXYCODONE HYDROCHLORIDE AND ACETAMINOPHEN 500 MG: 500 TABLET ORAL at 09:04

## 2022-04-22 RX ADMIN — ATORVASTATIN CALCIUM 10 MG: 10 TABLET, FILM COATED ORAL at 09:04

## 2022-04-22 NOTE — PLAN OF CARE
Problem: Occupational Therapy  Goal: Occupational Therapy Goal  Description: Goals to be met by: 4/28/22    Patient will increase functional independence with ADLs by performing:    LE Dressing with Supervision.  Grooming while standing at sink with CGA.  Toileting from bedside commode with Min A for hygiene and clothing management.   Toilet transfer to bedside commode with Min A.    Completed Goals:   LE Dressing with Minimal Assistance. 4/22/2022 (for socks)    Outcome: Ongoing, Progressing     Pt met one goal this date and is making progress towards others.  Rehab is recommended upon d/c from acute care to further address deficits and help pt improve overall functional independence.     GAVIN Christie  4/22/2022

## 2022-04-22 NOTE — PT/OT/SLP PROGRESS
Occupational Therapy   Treatment    Name: Shannan Medrano  MRN: [id number]  Admitting Diagnosis:  Pelvic ring fracture       Recommendations:     Discharge Recommendations: rehabilitation facility  Discharge Equipment Recommendations:  bedside commode, wheelchair (JR sized RW)  Barriers to discharge:   (current functional level)    Assessment:     Shannan Medrano is a 51 y.o. female with a medical diagnosis of Pelvic ring fracture.  She presents with pleasant affect. Performance deficits affecting function are weakness, impaired self care skills, impaired functional mobilty, gait instability, impaired balance, decreased safety awareness, impaired cognition, decreased ROM, pain, decreased lower extremity function, decreased coordination.  Pt agreeable to participating in therapy upon arrival to room.  Pt demonstrated improvement with LB dressing performing with supervision while long sitting in bed.  Pt able to perform sit <> stand transfer with Mod A, RW, and assist of 2.  Pt with improvement taking steps this date from bed <> chair performing with Min A, RW, and assist of 2.  Pain in (B) LE and pelvic region indicated with mobility.       Overall, pt tolerated therapy well.  She is making progress towards goals and would continue to benefit from skilled OT services to address problems listed above and increase independence with ADLs.  Rehab is recommended upon d/c from acute care to further address deficits and help pt improve overall functional independence.       Rehab Prognosis:  Good; patient would benefit from acute skilled OT services to address these deficits and reach maximum level of function.       Plan:     Patient to be seen 5 x/week to address the above listed problems via self-care/home management, therapeutic activities, therapeutic exercises  · Plan of Care Expires: 05/14/22  · Plan of Care Reviewed with: patient    Subjective     Pain/Comfort:  · Pain Rating 1:  (pain noted in (B) LE/pelvic region while  standing; pt unable to rate)  · Pain Rating Post-Intervention 1: 0/10 (pt comfortable seated up in chair at end of session)    Objective:     Communicated with: RN (Sunshine) prior to session.  Patient found HOB elevated with peripheral IV, bed alarm, suprapubic catheter upon OT entry to room.    General Precautions: Standard, contact, fall   Orthopedic Precautions:LLE partial weight bearing   Braces: N/A  Respiratory Status: Room air     Occupational Performance:     Bed Mobility:    · Supine <> sit: Mod A  · Scooting: Max A seated towards EOB    Functional Mobility/Transfers:  · Sit <> Stand: Mod A, RW, with assist of 2  · Assist and cues required for technique and positioning  · Functional Mobility: Pt ambulated 4 ft with Min A, RW, and assist of 2  · Pain in (B) LE and pelvic region indicated with mobility  · Difficulty placing weight through (B) LE and shifting weight observed    Activities of Daily Living:  · Lower Body Dressing: stand by assistance for donning socks while long sitting in bed.      [facility] 6 Click ADL: 15    Treatment & Education:  *Pt educated on role of OT in acute care setting.  *Pt performed LB dressing task while long sitting in bed  *Pt performed sit <> stand transfer from EOB; cues required for technique  *Pt ambulated 4 ft to address coordination, endurance, and balance needed for functional mobility   *POC reviewed with pt      Patient left up in chair with call button in reachEducation:  ; RN notified    GOALS:   Multidisciplinary Problems     Occupational Therapy Goals        Problem: Occupational Therapy    Goal Priority Disciplines Outcome Interventions   Occupational Therapy Goal     OT, PT/OT Ongoing, Progressing    Description: Goals to be met by: 4/28/22    Patient will increase functional independence with ADLs by performing:    LE Dressing with Supervision.  Grooming while standing at sink with CGA.  Toileting from bedside commode with Min A for hygiene and clothing management.    Toilet transfer to bedside commode with Herbert SONG    Completed Goals:   LE Dressing with Minimal Assistance. 4/22/2022 (for socks)                     Time Tracking:     OT Date of Treatment: 04/22/22  OT Start Time: 1015  OT Stop Time: 1032  OT Total Time (min): 17 min    Billable Minutes:Self Care/Home Management 17   *Co-Tx with PT    OT/MYNOR: OT          4/22/2022

## 2022-04-22 NOTE — PROGRESS NOTES
BS taken @ 1850=582.  Dr. Davenport notified and verbalized understanding. No new orders given. Will continue to monitor.

## 2022-04-22 NOTE — PT/OT/SLP PROGRESS
Physical Therapy Treatment    Patient Name:  Shannan Medrano   MRN:  [id number]    Recommendations:     Discharge Recommendations:  rehabilitation facility   Discharge Equipment Recommendations: bedside commode, walker, rolling, wheelchair (deng size RW)   Barriers to discharge: Decreased caregiver support    Assessment:     Shannan Medrano is a 51 y.o. female admitted with a medical diagnosis of Pelvic ring fracture s/p fall down stairs ( Pubic ramus fracture, right, closed, initial encounter and sacral ala fracture L). Pmhx significant for Down syndrome, DM-T2, CKD-3, cardiomyopathy, urinary retention s/p suprapubic catheter, osteopenia.  She presents with the following impairments/functional limitations:  weakness, impaired self care skills, impaired functional mobilty, gait instability, impaired balance, impaired cognition, decreased coordination, decreased lower extremity function, pain, decreased safety awareness, orthopedic precautions .    Patient continues to have significant pain with standing and attempted gait training. Pt with improved initiation of steps this date but continues to be limited in gait distance to less than 5 ft. Rec for dc to IRF.    Prior to admission pt was independent with mobility and there is expectation of returning to prior level of function to maintain independence avoiding readmission. Pt is at high risk of unplanned readmission due to fall risk and inability ambulate functional distances d/t pain. The lower level of care cannot provide total interdisciplinary approach needed. Pt is able to tolerate 3 hours of daily therapy. Pt is pleasant and motivated to return to prior level of function.     Rehab Prognosis: Good; patient would benefit from acute skilled PT services to address these deficits and reach maximum level of function.    Recent Surgery: * No surgery found *      Plan:     During this hospitalization, patient to be seen 6 x/week to address the identified rehab  impairments via gait training, therapeutic activities, therapeutic exercises, neuromuscular re-education and progress toward the following goals:    · Plan of Care Expires:  05/14/22    Subjective     Chief Complaint: None verbalized by pt  Patient/Family Comments/goals: Nonverbal signs of pain with standing; Patient agreeable to PT treatment.  Pain/Comfort:  Pain Rating 1: 0/10  Pain Rating Post-Intervention 1: 0/10 (painful WB with standing)      Objective:     Communicated with nurse prior to session.  Patient found supine with peripheral IV, bed alarm upon PT entry to room.     General Precautions: Standard, contact, fall   Orthopedic Precautions:LLE partial weight bearing   Braces: N/A  Respiratory Status: Room air     Patient donned yellow socks and gait belt for OOB mobility.    Functional Mobility:  · Bed Mobility:     · Supine to Sit: moderate assistance  · Transfers:     · Sit to Stand:  minimum assistance, moderate assistance and of 2 persons with rolling walker  · From EOB  · Gait: x4 ft with RW and Wander x2 person.   · TTWB of RLE with minimal stance time  · Improved stepping sequencing for 4 steps, then transitioned to pivoting d/t pain      AM-PAC 6 CLICK MOBILITY  Turning over in bed (including adjusting bedclothes, sheets and blankets)?: 3  Sitting down on and standing up from a chair with arms (e.g., wheelchair, bedside commode, etc.): 2  Moving from lying on back to sitting on the side of the bed?: 2  Moving to and from a bed to a chair (including a wheelchair)?: 2  Need to walk in hospital room?: 2  Climbing 3-5 steps with a railing?: 1  Basic Mobility Total Score: 12       Therapeutic Activities and Exercises:  Pt performed supine therapeutic exercises including ankle pumps, heel slides x 10 reps with verbal and tactile cues.     Patient left up in chair with all lines intact, call button in reach and chair alarm on.    GOALS:   Multidisciplinary Problems     Physical Therapy Goals         Problem: Physical Therapy    Goal Priority Disciplines Outcome Goal Variances Interventions   Physical Therapy Goal     PT, PT/OT Ongoing, Progressing     Description: Goals to be met by: 22    Patient will increase functional independence with mobility by performin. Supine<>sit with SBA without use of hospital bed features.   2. Sit<>stand with SBA with RW.  3. Gait x 50 feet with SBA with RW maintaining PWB of RLE.                   Time Tracking:     PT Received On: 22  PT Start Time: 1013     PT Stop Time: 1031  PT Total Time (min): 18 min     Overlap with OT for portions of session due to complex nature of patient and for safety with mobility to decrease fall risk for patient and caregiver injury requiring two skilled therapists to provide interventions.     Billable Minutes: Gait Training 15    Treatment Type: Treatment  PT/PTA: PT     PTA Visit Number: 0     2022

## 2022-04-23 PROCEDURE — 99204 PR OFFICE/OUTPT VISIT, NEW, LEVL IV, 45-59 MIN: ICD-10-PCS | Mod: ,,, | Performed by: INTERNAL MEDICINE

## 2022-04-23 PROCEDURE — 96372 THER/PROPH/DIAG INJ SC/IM: CPT | Performed by: INTERNAL MEDICINE

## 2022-04-23 PROCEDURE — 97530 THERAPEUTIC ACTIVITIES: CPT | Mod: CQ

## 2022-04-23 PROCEDURE — 99204 OFFICE O/P NEW MOD 45 MIN: CPT | Mod: ,,, | Performed by: INTERNAL MEDICINE

## 2022-04-23 PROCEDURE — 25000003 PHARM REV CODE 250: Performed by: INTERNAL MEDICINE

## 2022-04-23 PROCEDURE — G0378 HOSPITAL OBSERVATION PER HR: HCPCS

## 2022-04-23 PROCEDURE — 63600175 PHARM REV CODE 636 W HCPCS: Performed by: INTERNAL MEDICINE

## 2022-04-23 RX ADMIN — HEPARIN SODIUM 5000 UNITS: 5000 INJECTION INTRAVENOUS; SUBCUTANEOUS at 03:04

## 2022-04-23 RX ADMIN — Medication 1 TABLET: at 04:04

## 2022-04-23 RX ADMIN — ATORVASTATIN CALCIUM 10 MG: 10 TABLET, FILM COATED ORAL at 09:04

## 2022-04-23 RX ADMIN — Medication 1 TABLET: at 09:04

## 2022-04-23 RX ADMIN — HEPARIN SODIUM 5000 UNITS: 5000 INJECTION INTRAVENOUS; SUBCUTANEOUS at 09:04

## 2022-04-23 RX ADMIN — FOLIC ACID 1 MG: 1 TABLET ORAL at 09:04

## 2022-04-23 RX ADMIN — THERA TABS 1 TABLET: TAB at 09:04

## 2022-04-23 RX ADMIN — OXYBUTYNIN CHLORIDE 10 MG: 5 TABLET, EXTENDED RELEASE ORAL at 09:04

## 2022-04-23 RX ADMIN — OXYCODONE HYDROCHLORIDE AND ACETAMINOPHEN 500 MG: 500 TABLET ORAL at 09:04

## 2022-04-23 NOTE — PROGRESS NOTES
[facility] Surg [facility] Medicine  Progress Note    Patient Name: Shannan Medrano  MRN: [id number]  Patient Class: OP- Observation   Admission Date: 4/12/2022  Length of Stay: 0 days  Attending Physician: Taran Davenport MD        Subjective:     Principal Problem:Pelvic ring fracture        HPI:  Ms. Shannan Medrano is a 51 y.o. female, with PMH of Down Syndrome, T2DM, HLD, CKD-3, Cardiomyopathy, urinary retention s/p suprapubic catheter placement, who [place] [facility] [place] on 4/12/22 due to left hip & right buttocks pain after falling on Saturday. She further notes chronic lower abdominal pain. Per her caregiver's report, she fell down 4 steps. She states she has balance issues, but can walk, but has been unable to do so since her fall due to pain with bearing weight. She was evaluated in the ED with labs showing normal H&H, and Cr of 1.9. X-ray of the hips showed possible fracture of the right superior ramus. A CT of the pelvis showed a nondisplaced fracture of the right pubic ramus adjacent tot he symphysis. There was also a contralateral left sacral nondisplaced fracture of the pelvic ring. X-ray of the lumbar spine showed wedging of L1 vertebral body which may be remote or normal variant as well as Gr 1 anterolisthesis of L4 on L5. She was placed on OBS.       Overview/Hospital Course:  Patient presented after a recent fall with pain.  Her imaging showed right pubic ramus fracture and left pelvic ring fracture.  Ortho consulted and patient non-surgical.  Recommended partial WB on LLE.  Seen by PT and recommended for Rehab.      Interval History: No acute events.    Review of Systems   Unable to perform ROS: Other (cognitive impairment)     Objective:     Vital Signs (Most Recent):  Temp: 97.6 °F (36.4 °C) (04/22/22 1944)  Pulse: 77 (04/22/22 1944)  Resp: 16 (04/22/22 1944)  BP: 102/62 (04/22/22 1944)  SpO2: 100 % (04/22/22 1944)   Vital Signs (24h Range):  Temp:  [97.6 °F (36.4 °C)-99.2 °F (37.3  °C)] 97.6 °F (36.4 °C)  Pulse:  [68-80] 77  Resp:  [16-18] 16  SpO2:  [98 %-100 %] 100 %  BP: ()/(54-66) 102/62     Weight: 44 kg (97 lb)  Body mass index is 18.94 kg/m².    Intake/Output Summary (Last 24 hours) at 4/22/2022 2133  Last data filed at 4/22/2022 0600  Gross per 24 hour   Intake 240 ml   Output 600 ml   Net -360 ml        Physical Exam  Constitutional:       General: She is not in acute distress.  HENT:      Head: Atraumatic.   Eyes:      Conjunctiva/sclera: Conjunctivae normal.   Cardiovascular:      Rate and Rhythm: Normal rate and regular rhythm.      Heart sounds: Normal heart sounds. No murmur heard.  Pulmonary:      Effort: Pulmonary effort is normal.      Breath sounds: Normal breath sounds. No wheezing.   Abdominal:      General: Bowel sounds are normal. There is no distension.      Palpations: Abdomen is soft.      Tenderness: There is no abdominal tenderness.   Genitourinary:     Comments: Suprapubic catheter in place with yellow color urine output.  Musculoskeletal:         General: No deformity. Normal range of motion.      Cervical back: Neck supple.   Neurological:      Mental Status: She is alert and oriented to person, place, and time.       Significant Labs: All pertinent labs within the past 24 hours have been reviewed.    Significant Imaging: I have reviewed all pertinent imaging results/findings within the past 24 hours.      Assessment/Plan:      * Pelvic ring fracture and Pubic Ramus Fracture  Patient presented after a recent fall with pain.  Her imaging showed right pubic ramus fracture and left pelvic ring fracture.  Ortho consulted and patient non-surgical.  Recommended partial WB on LLE.  -PRN pain meds  -PT consulted - recommends rehab  -DVT prophylaxis with SQH for now      HLD (hyperlipidemia)  -Continue Atorvastatin 10 mg QD       CKD (chronic kidney disease) stage 3, GFR 30-59 ml/min  Cr is 1.9 on admission, which is better than recent measures.  Stable at 1.7.  -  avoid nephrotoxins   - renally dose meds       Type 2 diabetes mellitus  Home Meds:  Metformin 500mg  A1C 5.1 on admission  Glucose well controlled  - Diabetic diet   - will discontinues SSI with accuchecks AC/HS        Down syndrome  Noted      VTE Risk Mitigation (From admission, onward)         Ordered     heparin (porcine) injection 5,000 Units  Every 8 hours         04/13/22 1523     IP VTE LOW RISK PATIENT  Once         04/12/22 2327     Place sequential compression device  Until discontinued         04/12/22 2327                  Taran Davenport MD  Department of Hospital Medicine   [facility] - Med Surg ([facility])

## 2022-04-23 NOTE — SUBJECTIVE & OBJECTIVE
Interval History: No acute events.    Review of Systems   Unable to perform ROS: Other (cognitive impairment)     Objective:     Vital Signs (Most Recent):  Temp: 97.6 °F (36.4 °C) (04/22/22 1944)  Pulse: 77 (04/22/22 1944)  Resp: 16 (04/22/22 1944)  BP: 102/62 (04/22/22 1944)  SpO2: 100 % (04/22/22 1944)   Vital Signs (24h Range):  Temp:  [97.6 °F (36.4 °C)-99.2 °F (37.3 °C)] 97.6 °F (36.4 °C)  Pulse:  [68-80] 77  Resp:  [16-18] 16  SpO2:  [98 %-100 %] 100 %  BP: ()/(54-66) 102/62     Weight: 44 kg (97 lb)  Body mass index is 18.94 kg/m².    Intake/Output Summary (Last 24 hours) at 4/22/2022 2133  Last data filed at 4/22/2022 0600  Gross per 24 hour   Intake 240 ml   Output 600 ml   Net -360 ml        Physical Exam  Constitutional:       General: She is not in acute distress.  HENT:      Head: Atraumatic.   Eyes:      Conjunctiva/sclera: Conjunctivae normal.   Cardiovascular:      Rate and Rhythm: Normal rate and regular rhythm.      Heart sounds: Normal heart sounds. No murmur heard.  Pulmonary:      Effort: Pulmonary effort is normal.      Breath sounds: Normal breath sounds. No wheezing.   Abdominal:      General: Bowel sounds are normal. There is no distension.      Palpations: Abdomen is soft.      Tenderness: There is no abdominal tenderness.   Genitourinary:     Comments: Suprapubic catheter in place with yellow color urine output.  Musculoskeletal:         General: No deformity. Normal range of motion.      Cervical back: Neck supple.   Neurological:      Mental Status: She is alert and oriented to person, place, and time.       Significant Labs: All pertinent labs within the past 24 hours have been reviewed.    Significant Imaging: I have reviewed all pertinent imaging results/findings within the past 24 hours.

## 2022-04-23 NOTE — PT/OT/SLP PROGRESS
Physical Therapy Treatment    Patient Name:  Shannan Medrano   MRN:  [id number]    Recommendations:     Discharge Recommendations:  rehabilitation facility   Discharge Equipment Recommendations: bedside commode (Deng RW and deng wheelchair.)   Barriers to discharge: Decreased caregiver support    Assessment:     Shannan Medrano is a 51 y.o. female admitted with a medical diagnosis of Pelvic ring fracture.  She presents with the following impairments/functional limitations:  weakness, impaired endurance, decreased coordination, orthopedic precautions, impaired functional mobilty, gait instability, impaired balance.    Rehab Prognosis: Good; patient would benefit from acute skilled PT services to address these deficits and reach maximum level of function.    Recent Surgery: * No surgery found *      Plan:     During this hospitalization, patient to be seen 6 x/week to address the identified rehab impairments via gait training, therapeutic activities, therapeutic exercises, neuromuscular re-education and progress toward the following goals:    · Plan of Care Expires:  05/14/22    Subjective     Chief Complaint: Pt with no complaints at this time.   Patient/Family Comments/goals: Pt agreeable to PT treatment.   Pain/Comfort:  · Pain Rating 1: 0/10      Objective:     Communicated with nursing prior to session.  Patient found up in chair with chair check (catheter) upon PT entry to room.     General Precautions: Standard, contact, fall   Orthopedic Precautions:LLE partial weight bearing   Braces: N/A  Respiratory Status: Room air     Functional Mobility:  · Transfers:     · Sit to Stand:  sit to stand transfers were attempted but unsuccessful to pt not being able to clear her buttocks off the chair and not adequately using her UE's to perform transfer safely with rolling walker      AM-PAC 6 CLICK MOBILITY  Turning over in bed (including adjusting bedclothes, sheets and blankets)?: 3  Sitting down on and standing up from  a chair with arms (e.g., wheelchair, bedside commode, etc.): 2  Moving from lying on back to sitting on the side of the bed?: 2  Moving to and from a bed to a chair (including a wheelchair)?: 2  Need to walk in hospital room?: 2  Climbing 3-5 steps with a railing?: 1  Basic Mobility Total Score: 12       Therapeutic Activities and Exercises:       Patient left up in chair with all lines intact, call button in reach and chair alarm on..    GOALS:   Multidisciplinary Problems     Physical Therapy Goals        Problem: Physical Therapy    Goal Priority Disciplines Outcome Goal Variances Interventions   Physical Therapy Goal     PT, PT/OT Ongoing, Progressing     Description: Goals to be met by: 22    Patient will increase functional independence with mobility by performin. Supine<>sit with SBA without use of hospital bed features.   2. Sit<>stand with SBA with RW.  3. Gait x 50 feet with SBA with RW maintaining PWB of RLE.                   Time Tracking:     PT Received On: 22  PT Start Time: 1051     PT Stop Time: 1101  PT Total Time (min): 10 min     Billable Minutes: Therapeutic Activity 10    Treatment Type: Treatment  PT/PTA: PTA     PTA Visit Number: 1     2022

## 2022-04-23 NOTE — SUBJECTIVE & OBJECTIVE
Interval History: No acute events.    Review of Systems   Unable to perform ROS: Other (cognitive impairment)     Objective:     Vital Signs (Most Recent):  Temp: 98.4 °F (36.9 °C) (04/23/22 0737)  Pulse: 65 (04/23/22 0737)  Resp: 18 (04/23/22 0737)  BP: (!) 100/59 (04/23/22 0737)  SpO2: 98 % (04/23/22 0737)   Vital Signs (24h Range):  Temp:  [97.6 °F (36.4 °C)-98.7 °F (37.1 °C)] 98.4 °F (36.9 °C)  Pulse:  [63-80] 65  Resp:  [16-18] 18  SpO2:  [98 %-100 %] 98 %  BP: (100-104)/(56-62) 100/59     Weight: 44 kg (97 lb)  Body mass index is 18.94 kg/m².    Intake/Output Summary (Last 24 hours) at 4/23/2022 1048  Last data filed at 4/23/2022 0400  Gross per 24 hour   Intake 60 ml   Output 1075 ml   Net -1015 ml        Physical Exam  Constitutional:       General: She is not in acute distress.  HENT:      Head: Atraumatic.   Eyes:      Conjunctiva/sclera: Conjunctivae normal.   Cardiovascular:      Rate and Rhythm: Normal rate and regular rhythm.      Heart sounds: Normal heart sounds. No murmur heard.  Pulmonary:      Effort: Pulmonary effort is normal.      Breath sounds: Normal breath sounds. No wheezing.   Abdominal:      General: Bowel sounds are normal. There is no distension.      Palpations: Abdomen is soft.      Tenderness: There is no abdominal tenderness.   Genitourinary:     Comments: Suprapubic catheter in place with yellow color urine output.  Musculoskeletal:         General: No deformity. Normal range of motion.      Cervical back: Neck supple.   Neurological:      Mental Status: She is alert and oriented to person, place, and time.       Significant Labs: All pertinent labs within the past 24 hours have been reviewed.    Significant Imaging: I have reviewed all pertinent imaging results/findings within the past 24 hours.

## 2022-04-23 NOTE — PROGRESS NOTES
[facility] Surg [facility] Medicine  Progress Note    Patient Name: Shannan Medrano  MRN: [id number]  Patient Class: OP- Observation   Admission Date: 4/12/2022  Length of Stay: 0 days  Attending Physician: Stephany Damon MD  Primary Care Provider: Primary Doctor No        Subjective:     Principal Problem:Pelvic ring fracture        HPI:  Ms. Shannan Medrano is a 51 y.o. female, with PMH of Down Syndrome, T2DM, HLD, CKD-3, Cardiomyopathy, urinary retention s/p suprapubic catheter placement, who [facility] 4/12/22 due to left hip & right buttocks pain after falling on Saturday. She further notes chronic lower abdominal pain. Per her caregiver's report, she fell down 4 steps. She states she has balance issues, but can walk, but has been unable to do so since her fall due to pain with bearing weight. She was evaluated in the ED with labs showing normal H&H, and Cr of 1.9. X-ray of the hips showed possible fracture of the right superior ramus. A CT of the pelvis showed a nondisplaced fracture of the right pubic ramus adjacent tot he symphysis. There was also a contralateral left sacral nondisplaced fracture of the pelvic ring. X-ray of the lumbar spine showed wedging of L1 vertebral body which may be remote or normal variant as well as Gr 1 anterolisthesis of L4 on L5. She was placed on OBS.       Overview/Hospital Course:  Patient presented after a recent fall with pain.  Her imaging showed right pubic ramus fracture and left pelvic ring fracture.  Ortho consulted and patient non-surgical.  Recommended partial WB on LLE.  Seen by PT and recommended for Rehab.      Interval History: No acute events.    Review of Systems   Unable to perform ROS: Other (cognitive impairment)     Objective:     Vital Signs (Most Recent):  Temp: 98.4 °F (36.9 °C) (04/23/22 0737)  Pulse: 65 (04/23/22 0737)  Resp: 18 (04/23/22 0737)  BP: (!) 100/59 (04/23/22 0737)  SpO2: 98 % (04/23/22 0737)   Vital Signs (24h  Range):  Temp:  [97.6 °F (36.4 °C)-98.7 °F (37.1 °C)] 98.4 °F (36.9 °C)  Pulse:  [63-80] 65  Resp:  [16-18] 18  SpO2:  [98 %-100 %] 98 %  BP: (100-104)/(56-62) 100/59     Weight: 44 kg (97 lb)  Body mass index is 18.94 kg/m².    Intake/Output Summary (Last 24 hours) at 4/23/2022 1048  Last data filed at 4/23/2022 0400  Gross per 24 hour   Intake 60 ml   Output 1075 ml   Net -1015 ml        Physical Exam  Constitutional:       General: She is not in acute distress.  HENT:      Head: Atraumatic.   Eyes:      Conjunctiva/sclera: Conjunctivae normal.   Cardiovascular:      Rate and Rhythm: Normal rate and regular rhythm.      Heart sounds: Normal heart sounds. No murmur heard.  Pulmonary:      Effort: Pulmonary effort is normal.      Breath sounds: Normal breath sounds. No wheezing.   Abdominal:      General: Bowel sounds are normal. There is no distension.      Palpations: Abdomen is soft.      Tenderness: There is no abdominal tenderness.   Genitourinary:     Comments: Suprapubic catheter in place with yellow color urine output.  Musculoskeletal:         General: No deformity. Normal range of motion.      Cervical back: Neck supple.   Neurological:      Mental Status: She is alert and oriented to person, place, and time.       Significant Labs: All pertinent labs within the past 24 hours have been reviewed.    Significant Imaging: I have reviewed all pertinent imaging results/findings within the past 24 hours.      Assessment/Plan:      * Pelvic ring fracture and Pubic Ramus Fracture  Patient presented after a recent fall with pain.  Her imaging showed right pubic ramus fracture and left pelvic ring fracture.  Ortho consulted and patient non-surgical.  Recommended partial WB on LLE.  -PRN pain meds  -PT consulted - recommends rehab  -DVT prophylaxis with SQH for now      HLD (hyperlipidemia)  -Continue Atorvastatin 10 mg QD       CKD (chronic kidney disease) stage 3, GFR 30-59 ml/min  Cr is 1.9 on admission, which is  better than recent measures.  Stable at 1.7.  - avoid nephrotoxins   - renally dose meds       Type 2 diabetes mellitus  Home Meds:  Metformin 500mg  A1C 5.1 on admission  Glucose well controlled  - Diabetic diet          Down syndrome  Noted      VTE Risk Mitigation (From admission, onward)         Ordered     heparin (porcine) injection 5,000 Units  Every 8 hours         04/13/22 1523     IP VTE LOW RISK PATIENT  Once         04/12/22 2327     Place sequential compression device  Until discontinued         04/12/22 2327                Discharge Planning   COMPA:      Code Status: Full Code   Is the patient medically ready for discharge?:     Reason for patient still in hospital (select all that apply): Pending disposition  Discharge Plan A: Rehab   Discharge Delays:  (pending medical clearance)              Stephany Damon MD  Department of Hospital Medicine   [facility] ([facility])

## 2022-04-23 NOTE — PLAN OF CARE
No significant events this shift. Alert to self and poc reviewed with pt. Pt repositioning in bed with minimal assist. Good appetite and tolerating diet well. Suprapubic imsertion site clean and dry and without drainage. Catheter down to gravity, cloudy urine with some sediments noted. Prn Tramadol given for c/o pain with full relief noted. Remains free from falls, injury, and skin breakdown. All needs and concerns met, purposeful rounding done, and safety maintained. Will continue to monitor.

## 2022-04-24 PROCEDURE — 99214 OFFICE O/P EST MOD 30 MIN: CPT | Mod: ,,, | Performed by: INTERNAL MEDICINE

## 2022-04-24 PROCEDURE — 25000003 PHARM REV CODE 250: Performed by: EMERGENCY MEDICINE

## 2022-04-24 PROCEDURE — 99214 PR OFFICE/OUTPT VISIT, EST, LEVL IV, 30-39 MIN: ICD-10-PCS | Mod: ,,, | Performed by: INTERNAL MEDICINE

## 2022-04-24 PROCEDURE — 63600175 PHARM REV CODE 636 W HCPCS: Performed by: INTERNAL MEDICINE

## 2022-04-24 PROCEDURE — G0378 HOSPITAL OBSERVATION PER HR: HCPCS

## 2022-04-24 PROCEDURE — 96372 THER/PROPH/DIAG INJ SC/IM: CPT | Performed by: INTERNAL MEDICINE

## 2022-04-24 PROCEDURE — 25000003 PHARM REV CODE 250: Performed by: INTERNAL MEDICINE

## 2022-04-24 RX ADMIN — FOLIC ACID 1 MG: 1 TABLET ORAL at 08:04

## 2022-04-24 RX ADMIN — Medication 1 TABLET: at 08:04

## 2022-04-24 RX ADMIN — Medication 1 TABLET: at 05:04

## 2022-04-24 RX ADMIN — THERA TABS 1 TABLET: TAB at 08:04

## 2022-04-24 RX ADMIN — ATORVASTATIN CALCIUM 10 MG: 10 TABLET, FILM COATED ORAL at 08:04

## 2022-04-24 RX ADMIN — HEPARIN SODIUM 5000 UNITS: 5000 INJECTION INTRAVENOUS; SUBCUTANEOUS at 05:04

## 2022-04-24 RX ADMIN — ACETAMINOPHEN 650 MG: 325 TABLET, FILM COATED ORAL at 02:04

## 2022-04-24 RX ADMIN — LEVOTHYROXINE SODIUM 50 MCG: 50 TABLET ORAL at 06:04

## 2022-04-24 RX ADMIN — OXYBUTYNIN CHLORIDE 10 MG: 5 TABLET, EXTENDED RELEASE ORAL at 08:04

## 2022-04-24 RX ADMIN — Medication 6 MG: at 09:04

## 2022-04-24 RX ADMIN — OXYCODONE HYDROCHLORIDE AND ACETAMINOPHEN 500 MG: 500 TABLET ORAL at 08:04

## 2022-04-24 RX ADMIN — HEPARIN SODIUM 5000 UNITS: 5000 INJECTION INTRAVENOUS; SUBCUTANEOUS at 06:04

## 2022-04-24 NOTE — SUBJECTIVE & OBJECTIVE
Interval History: No acute events.    Review of Systems   Unable to perform ROS: Other (cognitive impairment)     Objective:     Vital Signs (Most Recent):  Temp: 97.6 °F (36.4 °C) (04/24/22 0352)  Pulse: 60 (04/24/22 0352)  Resp: 17 (04/24/22 0352)  BP: (!) 111/55 (04/24/22 0352)  SpO2: 98 % (04/24/22 0352)   Vital Signs (24h Range):  Temp:  [97.6 °F (36.4 °C)-98.4 °F (36.9 °C)] 97.6 °F (36.4 °C)  Pulse:  [60-75] 60  Resp:  [16-18] 17  SpO2:  [96 %-100 %] 98 %  BP: (109-130)/(55-88) 111/55     Weight: 44 kg (97 lb)  Body mass index is 18.94 kg/m².    Intake/Output Summary (Last 24 hours) at 4/24/2022 0811  Last data filed at 4/24/2022 0500  Gross per 24 hour   Intake 880 ml   Output 1700 ml   Net -820 ml        Physical Exam  Constitutional:       General: She is not in acute distress.  HENT:      Head: Atraumatic.   Eyes:      Conjunctiva/sclera: Conjunctivae normal.   Cardiovascular:      Rate and Rhythm: Normal rate and regular rhythm.      Heart sounds: Normal heart sounds. No murmur heard.  Pulmonary:      Effort: Pulmonary effort is normal.      Breath sounds: Normal breath sounds. No wheezing.   Abdominal:      General: Bowel sounds are normal. There is no distension.      Palpations: Abdomen is soft.      Tenderness: There is no abdominal tenderness.   Genitourinary:     Comments: Suprapubic catheter in place with yellow color urine output.  Musculoskeletal:         General: No deformity. Normal range of motion.      Cervical back: Neck supple.   Neurological:      Mental Status: She is alert and oriented to person, place, and time.       Significant Labs: All pertinent labs within the past 24 hours have been reviewed.    Significant Imaging: I have reviewed all pertinent imaging results/findings within the past 24 hours.

## 2022-04-24 NOTE — PLAN OF CARE
Patient up to chair tolerated well. Patient without discomfort. Purposeful rounding completed. Call light within reach. Bed alarms on. Side rails X2.       Problem: Adult Inpatient Plan of Care  Goal: Plan of Care Review  Outcome: Ongoing, Progressing     Problem: Diabetes Comorbidity  Goal: Blood Glucose Level Within Targeted Range  Outcome: Ongoing, Progressing     Problem: Fall Injury Risk  Goal: Absence of Fall and Fall-Related Injury  Outcome: Ongoing, Progressing     Problem: Skin Injury Risk Increased  Goal: Skin Health and Integrity  Outcome: Ongoing, Progressing     Problem: Pain Acute  Goal: Acceptable Pain Control and Functional Ability  Outcome: Ongoing, Progressing

## 2022-04-24 NOTE — PLAN OF CARE
Plan of care reviewed with patient, pt verbalized understanding but needs reinforcement. Pt AAOX1 to self. Pt remains free from fall, injury, and skin breakdown. Pt complaints of  mild pain with activity, PRN pain medication given as order. Tolerating diet and fluids offer and encouraged. Contact Isolation precaution maintained per order. Purposeful hourly rounding done. Safety maintained.     Problem: Adult Inpatient Plan of Care  Goal: Plan of Care Review  Outcome: Ongoing, Progressing  Goal: Absence of Hospital-Acquired Illness or Injury  Outcome: Ongoing, Progressing  Goal: Optimal Comfort and Wellbeing  Outcome: Ongoing, Progressing  Goal: Readiness for Transition of Care  Outcome: Ongoing, Progressing     Problem: Pain Acute  Goal: Acceptable Pain Control and Functional Ability  Outcome: Ongoing, Progressing     Problem: Infection  Goal: Absence of Infection Signs and Symptoms  Outcome: Ongoing, Progressing     Problem: Skin Injury Risk Increased  Goal: Skin Health and Integrity  Outcome: Ongoing, Progressing     Problem: Fall Injury Risk  Goal: Absence of Fall and Fall-Related Injury  Outcome: Ongoing, Progressing

## 2022-04-24 NOTE — PROGRESS NOTES
[facility] S[facility] Medicine  Progress Note    Patient Name: Shannan Medrano  MRN: [id number]  Patient Class: OP- Observation   Admission Date: 4/12/2022  Length of Stay: 0 days  Attending Physician: Stephany Damon MD  Primary Care Provider: Primary Doctor No        Subjective:     Principal Problem:Pelvic ring fracture        HPI:  Ms. Shannan Medrano is a 51 y.o. female, with PMH of Down Syndrome, T2DM, HLD, CKD-3, Cardiomyopathy, urinary retention s/p suprapubic catheter placement, who presented [facility] on 4/12/22 due to left hip & right buttocks pain after falling on Saturday. She further notes chronic lower abdominal pain. Per her caregiver's report, she fell down 4 steps. She states she has balance issues, but can walk, but has been unable to do so since her fall due to pain with bearing weight. She was evaluated in the ED with labs showing normal H&H, and Cr of 1.9. X-ray of the hips showed possible fracture of the right superior ramus. A CT of the pelvis showed a nondisplaced fracture of the right pubic ramus adjacent tot he symphysis. There was also a contralateral left sacral nondisplaced fracture of the pelvic ring. X-ray of the lumbar spine showed wedging of L1 vertebral body which may be remote or normal variant as well as Gr 1 anterolisthesis of L4 on L5. She was placed on OBS.       Overview/Hospital Course:  Patient presented after a recent fall with pain.  Her imaging showed right pubic ramus fracture and left pelvic ring fracture.  Ortho consulted and patient non-surgical.  Recommended partial WB on LLE.  Seen by PT and recommended for Rehab.      Interval History: No acute events.    Review of Systems   Unable to perform ROS: Other (cognitive impairment)     Objective:     Vital Signs (Most Recent):  Temp: 97.6 °F (36.4 °C) (04/24/22 0352)  Pulse: 60 (04/24/22 0352)  Resp: 17 (04/24/22 0352)  BP: (!) 111/55 (04/24/22 0352)  SpO2: 98 % (04/24/22 0352)   Vital Signs (24h  Range):  Temp:  [97.6 °F (36.4 °C)-98.4 °F (36.9 °C)] 97.6 °F (36.4 °C)  Pulse:  [60-75] 60  Resp:  [16-18] 17  SpO2:  [96 %-100 %] 98 %  BP: (109-130)/(55-88) 111/55     Weight: 44 kg (97 lb)  Body mass index is 18.94 kg/m².    Intake/Output Summary (Last 24 hours) at 4/24/2022 0811  Last data filed at 4/24/2022 0500  Gross per 24 hour   Intake 880 ml   Output 1700 ml   Net -820 ml        Physical Exam  Constitutional:       General: She is not in acute distress.  HENT:      Head: Atraumatic.   Eyes:      Conjunctiva/sclera: Conjunctivae normal.   Cardiovascular:      Rate and Rhythm: Normal rate and regular rhythm.      Heart sounds: Normal heart sounds. No murmur heard.  Pulmonary:      Effort: Pulmonary effort is normal.      Breath sounds: Normal breath sounds. No wheezing.   Abdominal:      General: Bowel sounds are normal. There is no distension.      Palpations: Abdomen is soft.      Tenderness: There is no abdominal tenderness.   Genitourinary:     Comments: Suprapubic catheter in place with yellow color urine output.  Musculoskeletal:         General: No deformity. Normal range of motion.      Cervical back: Neck supple.   Neurological:      Mental Status: She is alert and oriented to person, place, and time.       Significant Labs: All pertinent labs within the past 24 hours have been reviewed.    Significant Imaging: I have reviewed all pertinent imaging results/findings within the past 24 hours.      Assessment/Plan:      * Pelvic ring fracture and Pubic Ramus Fracture  Patient presented after a recent fall with pain.  Her imaging showed right pubic ramus fracture and left pelvic ring fracture.  Ortho consulted and patient non-surgical.  Recommended partial WB on LLE.  -PRN pain meds  -PT consulted - recommends rehab  -DVT prophylaxis with SQH for now      HLD (hyperlipidemia)  -Continue Atorvastatin 10 mg QD       CKD (chronic kidney disease) stage 3, GFR 30-59 ml/min  Cr is 1.9 on admission, which is  better than recent measures.  Stable at 1.7.  - avoid nephrotoxins   - renally dose meds       Type 2 diabetes mellitus  Home Meds:  Metformin 500mg  A1C 5.1 on admission  Glucose well controlled  - Diabetic diet          Down syndrome  Noted      VTE Risk Mitigation (From admission, onward)         Ordered     heparin (porcine) injection 5,000 Units  Every 8 hours         04/13/22 1523     IP VTE LOW RISK PATIENT  Once         04/12/22 2327     Place sequential compression device  Until discontinued         04/12/22 2327                Discharge Planning   COMPA:      Code Status: Full Code   Is the patient medically ready for discharge?:     Reason for patient still in hospital (select all that apply): Pending disposition  Discharge Plan A: Rehab   Discharge Delays:  (pending medical clearance)              Stephany Damon MD  Department of Hospital Medicine   [facility] ([facility])

## 2022-04-25 PROCEDURE — 63600175 PHARM REV CODE 636 W HCPCS: Performed by: INTERNAL MEDICINE

## 2022-04-25 PROCEDURE — 99214 OFFICE O/P EST MOD 30 MIN: CPT | Mod: ,,, | Performed by: INTERNAL MEDICINE

## 2022-04-25 PROCEDURE — 25000003 PHARM REV CODE 250: Performed by: INTERNAL MEDICINE

## 2022-04-25 PROCEDURE — 96372 THER/PROPH/DIAG INJ SC/IM: CPT | Performed by: INTERNAL MEDICINE

## 2022-04-25 PROCEDURE — G0378 HOSPITAL OBSERVATION PER HR: HCPCS

## 2022-04-25 PROCEDURE — 99214 PR OFFICE/OUTPT VISIT, EST, LEVL IV, 30-39 MIN: ICD-10-PCS | Mod: ,,, | Performed by: INTERNAL MEDICINE

## 2022-04-25 PROCEDURE — 94761 N-INVAS EAR/PLS OXIMETRY MLT: CPT

## 2022-04-25 PROCEDURE — 97530 THERAPEUTIC ACTIVITIES: CPT

## 2022-04-25 PROCEDURE — 97116 GAIT TRAINING THERAPY: CPT

## 2022-04-25 PROCEDURE — 97535 SELF CARE MNGMENT TRAINING: CPT

## 2022-04-25 RX ADMIN — Medication 1 TABLET: at 09:04

## 2022-04-25 RX ADMIN — HEPARIN SODIUM 5000 UNITS: 5000 INJECTION INTRAVENOUS; SUBCUTANEOUS at 12:04

## 2022-04-25 RX ADMIN — ACETAMINOPHEN 650 MG: 325 TABLET, FILM COATED ORAL at 05:04

## 2022-04-25 RX ADMIN — LEVOTHYROXINE SODIUM 50 MCG: 50 TABLET ORAL at 05:04

## 2022-04-25 RX ADMIN — OXYBUTYNIN CHLORIDE 10 MG: 5 TABLET, EXTENDED RELEASE ORAL at 09:04

## 2022-04-25 RX ADMIN — HEPARIN SODIUM 5000 UNITS: 5000 INJECTION INTRAVENOUS; SUBCUTANEOUS at 10:04

## 2022-04-25 RX ADMIN — HEPARIN SODIUM 5000 UNITS: 5000 INJECTION INTRAVENOUS; SUBCUTANEOUS at 09:04

## 2022-04-25 RX ADMIN — OXYCODONE HYDROCHLORIDE AND ACETAMINOPHEN 500 MG: 500 TABLET ORAL at 09:04

## 2022-04-25 RX ADMIN — THERA TABS 1 TABLET: TAB at 09:04

## 2022-04-25 RX ADMIN — HEPARIN SODIUM 5000 UNITS: 5000 INJECTION INTRAVENOUS; SUBCUTANEOUS at 05:04

## 2022-04-25 RX ADMIN — FOLIC ACID 1 MG: 1 TABLET ORAL at 09:04

## 2022-04-25 RX ADMIN — Medication 1 TABLET: at 05:04

## 2022-04-25 RX ADMIN — ATORVASTATIN CALCIUM 10 MG: 10 TABLET, FILM COATED ORAL at 09:04

## 2022-04-25 NOTE — PLAN OF CARE
Pt lying in bed awake, safety precautions maintained. Tolerating diet well. Purposeful rounding was preformed during this shift.  Problem: Adult Inpatient Plan of Care  Goal: Plan of Care Review  Outcome: Ongoing, Progressing  Goal: Patient-Specific Goal (Individualized)  Outcome: Ongoing, Progressing  Goal: Absence of Hospital-Acquired Illness or Injury  Outcome: Ongoing, Progressing  Goal: Optimal Comfort and Wellbeing  Outcome: Ongoing, Progressing  Goal: Readiness for Transition of Care  Outcome: Ongoing, Progressing     Problem: Diabetes Comorbidity  Goal: Blood Glucose Level Within Targeted Range  Outcome: Ongoing, Progressing     Problem: Fall Injury Risk  Goal: Absence of Fall and Fall-Related Injury  Outcome: Ongoing, Progressing     Problem: Skin Injury Risk Increased  Goal: Skin Health and Integrity  Outcome: Ongoing, Progressing     Problem: Infection  Goal: Absence of Infection Signs and Symptoms  Outcome: Ongoing, Progressing     Problem: Pain Acute  Goal: Acceptable Pain Control and Functional Ability  Outcome: Ongoing, Progressing

## 2022-04-25 NOTE — NURSING
Plan of care reviewed with patient, pt verbalized understanding but needs reinforcement. Pt AAOX1 to self. Pt remains free from fall, injury, and skin breakdown. Contact Isolation precaution maintained per order. Purposeful hourly rounding done. Safety maintained.      Problem: Adult Inpatient Plan of Care  Goal: Plan of Care Review  Outcome: Ongoing, Progressing  Goal: Absence of Hospital-Acquired Illness or Injury  Outcome: Ongoing, Progressing  Goal: Optimal Comfort and Wellbeing  Outcome: Ongoing, Progressing  Goal: Readiness for Transition of Care  Outcome: Ongoing, Progressing     Problem: Pain Acute  Goal: Acceptable Pain Control and Functional Ability  Outcome: Ongoing, Progressing     Problem: Infection  Goal: Absence of Infection Signs and Symptoms  Outcome: Ongoing, Progressing     Problem: Skin Injury Risk Increased  Goal: Skin Health and Integrity  Outcome: Ongoing, Progressing     Problem: Fall Injury Risk  Goal: Absence of Fall and Fall-Related Injury  Outcome: Ongoing, Progressing

## 2022-04-25 NOTE — PT/OT/SLP PROGRESS
Physical Therapy Treatment    Patient Name:  Shannan Medrano   MRN:  [id number]    Recommendations:     Discharge Recommendations:  rehabilitation facility   Discharge Equipment Recommendations: bedside commode (Deng RW and deng wheelchair.)   Barriers to discharge: Decreased caregiver support    Assessment:     Shannan Medrano is a 51 y.o. female admitted with a medical diagnosis of Pelvic ring fracture s/p fall down stairs ( Pubic ramus fracture, right, closed, initial encounter and sacral ala fracture L). Pmhx significant for Down syndrome, DM-T2, CKD-3, cardiomyopathy, urinary retention s/p suprapubic catheter, osteopenia.  She presents with the following impairments/functional limitations:  impaired self care skills, impaired functional mobilty, gait instability, impaired balance, impaired cognition, decreased coordination, decreased lower extremity function, decreased safety awareness, pain, decreased ROM, orthopedic precautions .    Patient continues to have significant pain with standing and attempted gait training. Attempted to coordinate pain medication with nursing prior to session but no pain meds administered per EMR. Patient with minimal progress towards goals since initial evaluation, continue to rec IRF but if continues to demo limited progress might need to re-consider DC recs.     Prior to admission pt was independent with mobility and there is expectation of returning to prior level of function to maintain independence avoiding readmission. Pt is at high risk of unplanned readmission due to fall risk and inability ambulate functional distances d/t pain. The lower level of care cannot provide total interdisciplinary approach needed. Pt is able to tolerate 3 hours of daily therapy. Pt is pleasant and motivated to return to prior level of function.     Rehab Prognosis: Good; patient would benefit from acute skilled PT services to address these deficits and reach maximum level of function.    Recent  Surgery: * No surgery found *      Plan:     During this hospitalization, patient to be seen 6 x/week to address the identified rehab impairments via gait training, therapeutic activities, therapeutic exercises, neuromuscular re-education and progress toward the following goals:    · Plan of Care Expires:  05/14/22    Subjective     Chief Complaint: None verbalized by pt, crying out with pain with standing  Patient/Family Comments/goals: None verbalized by pt; Patient agreeable to PT treatment.  Pain/Comfort:  Pain Rating 1: 0/10  Location 1: hip  Pain Addressed 1: Reposition, Distraction, Cessation of Activity  Pain Rating Post-Intervention 1:  (crying out with attempted gait)      Objective:     Communicated with Kay prior to session.  Patient found supine with bed alarm (suprapubic catheter) upon PT entry to room.     General Precautions: Standard, contact, fall   Orthopedic Precautions:LLE partial weight bearing   Braces: N/A  Respiratory Status: Room air     Patient donned yellow socks and gait belt for OOB mobility.    Functional Mobility:  · Bed Mobility:     · Supine to Sit: maximal assistance  · Transfers:     · Sit to Stand:  Maximal assistance with rolling walker and HHA  · x1 attempt from EOB with RW  · x1 attempted from EOB with HHA  · x2 attempts from BSC with HHA  · Toilet Transfer: maximal assistance stand pivot with HHA  · Gait: x4 ft with HHA and maxA progressing to totalA  · TTWB of RLE with minimal stance time  · Improved stepping sequencing for 3 steps, then transitioned to pivoting d/t pain  · At end of gait bout, required manual assistance at trunk to initial weight shifting      AM-PAC 6 CLICK MOBILITY  Turning over in bed (including adjusting bedclothes, sheets and blankets)?: 3  Sitting down on and standing up from a chair with arms (e.g., wheelchair, bedside commode, etc.): 2  Moving from lying on back to sitting on the side of the bed?: 2  Moving to and from a bed to a chair (including a  wheelchair)?: 2  Need to walk in hospital room?: 2  Climbing 3-5 steps with a railing?: 1  Basic Mobility Total Score: 12       Therapeutic Activities and Exercises:  · Pt somnolent, slow to awaken and minimal initiation of bed mobility requiring increased assistance  · Found soiled with BM, assisted with transfer and standing bouts while OT assisted with self care tasks    Patient left up in chair with all lines intact, call button in reach and chair alarm on.    GOALS:   Multidisciplinary Problems     Physical Therapy Goals        Problem: Physical Therapy    Goal Priority Disciplines Outcome Goal Variances Interventions   Physical Therapy Goal     PT, PT/OT Ongoing, Progressing     Description: Goals to be met by: 22    Patient will increase functional independence with mobility by performin. Supine<>sit with SBA without use of hospital bed features.   2. Sit<>stand with SBA with RW.  3. Gait x 50 feet with SBA with RW maintaining PWB of RLE.                   Time Tracking:     PT Received On: 22  PT Start Time: 1222     PT Stop Time: 1247  PT Total Time (min): 25 min     Overlap with OT for portions of session due to complex nature of patient and for safety with mobility to decrease fall risk for patient and caregiver injury requiring two skilled therapists to provide interventions.     Billable Minutes: Gait Training 10 and Therapeutic Activity 15    Treatment Type: Treatment  PT/PTA: PT     PTA Visit Number: 0     2022

## 2022-04-25 NOTE — ASSESSMENT & PLAN NOTE
Cr is 1.9 on admission, which is better than recent measures.  Stable   - avoid nephrotoxins   - renally dose meds

## 2022-04-25 NOTE — PROGRESS NOTES
[facility] Medicine  Progress Note    Patient Name: Shannan Medrano  MRN: [id number]  Patient Class: OP- Observation   Admission Date: 4/12/2022  Length of Stay: 0 days  Attending Physician: Stephany Damon MD  Primary Care Provider: Primary Doctor No        Subjective:     Principal Problem:Pelvic ring fracture        HPI:  Ms. Shannan Medrano is a 51 y.o. female, with PMH of Down Syndrome, T2DM, HLD, CKD-3, Cardiomyopathy, urinary retention s/p suprapubic catheter placement, who [facility] [place]/12/22 due to left hip & right buttocks pain after falling on Saturday. She further notes chronic lower abdominal pain. Per her caregiver's report, she fell down 4 steps. She states she has balance issues, but can walk, but has been unable to do so since her fall due to pain with bearing weight. She was evaluated in the ED with labs showing normal H&H, and Cr of 1.9. X-ray of the hips showed possible fracture of the right superior ramus. A CT of the pelvis showed a nondisplaced fracture of the right pubic ramus adjacent tot he symphysis. There was also a contralateral left sacral nondisplaced fracture of the pelvic ring. X-ray of the lumbar spine showed wedging of L1 vertebral body which may be remote or normal variant as well as Gr 1 anterolisthesis of L4 on L5. She was placed on OBS.       Overview/Hospital Course:  Patient presented after a recent fall with pain.  Her imaging showed right pubic ramus fracture and left pelvic ring fracture.  Ortho consulted and patient non-surgical.  Recommended partial WB on LLE.  Seen by PT and recommended for Rehab.      Interval History: NAEON    Review of Systems   Unable to perform ROS: Other (cognitive impairment)     Objective:     Vital Signs (Most Recent):  Temp: 98 °F (36.7 °C) (04/25/22 0729)  Pulse: (!) 59 (04/25/22 0729)  Resp: 18 (04/25/22 0729)  BP: (!) 94/50 (04/25/22 0729)  SpO2: 98 % (04/25/22 0729)   Vital Signs (24h Range):  Temp:   [97 °F (36.1 °C)-99 °F (37.2 °C)] 98 °F (36.7 °C)  Pulse:  [] 59  Resp:  [18-20] 18  SpO2:  [96 %-99 %] 98 %  BP: ()/(50-65) 94/50     Weight: 44 kg (97 lb)  Body mass index is 18.94 kg/m².    Intake/Output Summary (Last 24 hours) at 4/25/2022 0849  Last data filed at 4/25/2022 0610  Gross per 24 hour   Intake 900 ml   Output 3250 ml   Net -2350 ml        Physical Exam  Constitutional:       General: She is not in acute distress.  HENT:      Head: Atraumatic.   Eyes:      Conjunctiva/sclera: Conjunctivae normal.   Cardiovascular:      Rate and Rhythm: Normal rate and regular rhythm.      Heart sounds: Normal heart sounds. No murmur heard.  Pulmonary:      Effort: Pulmonary effort is normal.      Breath sounds: Normal breath sounds. No wheezing.   Abdominal:      General: Bowel sounds are normal. There is no distension.      Palpations: Abdomen is soft.      Tenderness: There is no abdominal tenderness.   Genitourinary:     Comments: Suprapubic catheter in place with yellow color urine output.  Musculoskeletal:         General: No deformity. Normal range of motion.      Cervical back: Neck supple.   Neurological:      Mental Status: She is alert and oriented to person, place, and time.       Significant Labs: All pertinent labs within the past 24 hours have been reviewed.    Significant Imaging: I have reviewed all pertinent imaging results/findings within the past 24 hours.      Assessment/Plan:      * Pelvic ring fracture and Pubic Ramus Fracture  Patient presented after a recent fall with pain.  Her imaging showed right pubic ramus fracture and left pelvic ring fracture.  Ortho consulted and patient non-surgical.  Recommended partial WB on LLE.  -PRN pain meds  -PT consulted - recommends rehab  -DVT prophylaxis with SQH for now      HLD (hyperlipidemia)  -Continue Atorvastatin 10 mg QD       CKD (chronic kidney disease) stage 3, GFR 30-59 ml/min  Cr is 1.9 on admission, which is better than recent  measures.  Stable   - avoid nephrotoxins   - renally dose meds       Type 2 diabetes mellitus  Home Meds:  Metformin 500mg  A1C 5.1 on admission  Glucose well controlled  - Diabetic diet          Down syndrome  Noted      VTE Risk Mitigation (From admission, onward)         Ordered     heparin (porcine) injection 5,000 Units  Every 8 hours         04/13/22 1523     IP VTE LOW RISK PATIENT  Once         04/12/22 2327     Place sequential compression device  Until discontinued         04/12/22 2327                Discharge Planning   COMPA:      Code Status: Full Code   Is the patient medically ready for discharge?:     Reason for patient still in hospital (select all that apply): Treatment  Discharge Plan A: Rehab   Discharge Delays:  (pending medical clearance)              Stephany Damon MD  Department of Hospital Medicine   [employer] [facility] ([facility])

## 2022-04-25 NOTE — PT/OT/SLP PROGRESS
Occupational Therapy   Treatment    Name: Shannan Medrano  MRN: [id number]  Admitting Diagnosis:  Pelvic ring fracture       Recommendations:     Discharge Recommendations: rehabilitation facility  Discharge Equipment Recommendations:   (TBD at next level of care)  Barriers to discharge:   (current functional level)    Assessment:     Shannan Medrano is a 51 y.o. female with a medical diagnosis of Pelvic ring fracture.  She presents with drowsiness. Performance deficits affecting function are weakness, impaired endurance, impaired cognition, impaired self care skills, impaired functional mobilty, orthopedic precautions, gait instability, impaired balance, decreased coordination.  Pt agreeable to participating in therapy upon arrival to room.  Pt required increased assist of Max A-Total A for sit <> stand transfer this date from EOB and BSC.  Pt indicated significant pain with all transfers, standing, and functional mobility this date.  Total A required to perform neela care in standing position after bowel movement from BSC.    Overall, pt tolerated therapy fair.  She continues to be impacted by pain. She would continue to benefit from skilled OT services to address problems listed above and increase independence with ADLs.  Rehab is recommended upon d/c from acute care to further address deficits and help pt improve overall functional independence.         Rehab Prognosis:  Good; patient would benefit from acute skilled OT services to address these deficits and reach maximum level of function.       Plan:     Patient to be seen 5 x/week to address the above listed problems via self-care/home management, therapeutic activities, therapeutic exercises  · Plan of Care Expires: 05/14/22  · Plan of Care Reviewed with: patient    Subjective     Pain/Comfort:  Pain Rating 1: 0/10  Pain Rating Post-Intervention 1:  (pain noted in (B) LE and pelvic region when standing and taking steps)    Objective:     Communicated with: RN  (Kay) prior to session.  Patient found asleep with HOB elevated with  (suprapubic catheter) upon OT entry to room.    *Pt found to be soiled upon arrival to room, then had additional bowel movement from BSC.    General Precautions: Standard, contact, fall   Orthopedic Precautions:LLE partial weight bearing   Braces: N/A  Respiratory Status: Room air     Occupational Performance:     Bed Mobility:    · Supine <> Sit: Max A  · Scooting: Max A seated towards EOB  · Notes: Increased time and visual cues provided; however, decreased initiation observed    Functional Mobility/Transfers:  · Sit <> Stand:   · Max A, RW x 1 trial from EOB  · Max A, no AD x 1 trial from EOB  · Max A, no AD x 2 trials from BSC  · Toilet:  Max A via stand pivot from bed <> BSC  · Functional Mobility: Pt took steps with Max A-Total A from BSC <> chair (~3-4 ft).  · Difficulty shifting weight and placing weight through (B) LE observed  · Significant pain indicated    Activities of Daily Living:  · Upper Body Dressing:   · Max A for doffing/donning gown while seated on BSC.  · Total A for donning glasses while supine with HOB elevated.  · Visual model provided, but no task initiation observed  · Grooming:   · Max A-SBA for cleaning hands with  while seated on EOB and up in chair.  · Total A for combing hair while seated on BSC.    · Visual cues provided, but no initiation of task observed  · Pain indicated 2* knots present so activity was stopped.  · Toileting: Total A for performing neela care in standing position after bowel movement from BSC.  · Pain indicated with standing requiring brief seated rest break during activity  · Max A-Total A Required for standing balance during task      [facility] 6 Click ADL: 15    Treatment & Education:  *Session focused on promoting increased endurance, strength, balance, coordination, and ROM needed for ADLs and functional transfers as part of daily routine.    -Pt educated on role of OT in acute care  setting  -Pt performed UB dressing task, toileting, and grooming as noted above.  Cues required for task initiation with fair-poor follow through.  -Pt performed toilet transfer to BSC  -Pt performed sit <> stand transfer from EOB and BSC  -Pt took steps from BSC <> chair  -POC reviewed       Patient left up in chair with all lines intact and call button in reachEducation:      GOALS:   Multidisciplinary Problems     Occupational Therapy Goals        Problem: Occupational Therapy    Goal Priority Disciplines Outcome Interventions   Occupational Therapy Goal     OT, PT/OT Ongoing, Progressing    Description: Goals to be met by: 4/28/22    Patient will increase functional independence with ADLs by performing:    LE Dressing with Supervision.  Grooming while seated at EOB with SBA.  Toileting from bedside commode with Min A for hygiene and clothing management.   Toilet transfer to bedside commode with Min A.    Completed Goals:   LE Dressing with Minimal Assistance. 4/22/2022 (for socks)                     Time Tracking:     OT Date of Treatment: 04/25/22  OT Start Time: 1222  OT Stop Time: 1249  OT Total Time (min): 27 min    Billable Minutes:Self Care/Home Management 23   *Co-tx with PT    OT/MYNOR: OT          4/25/2022

## 2022-04-25 NOTE — PLAN OF CARE
BART spoke to Nuzhat at \[facility]\"" rehab, [facility] is not able to accept pt without  an actual POA. Pt is not able to sign her own POA, and [facility] does not have that document.    Pt is not able to sign and agree for [facility] to be her POA. CM to call Letha, supervisor of pt's group facility to discuss need for POA again. JOSEP sent a paper stating that pt signed herself in to [facility], pt has no family to sign.    Cm to discuss with MD and Case Management Director.   04/25/22 3042   Discharge Reassessment   Assessment Type Discharge Planning Reassessment   Discharge Plan discussed with:   (Baylee)   Name(s) and Number(s) Letha, [phone]   Communicated COMPA with patient/caregiver Date not available/Unable to determine   Discharge Plan A Rehab   Discharge Plan B Rehab

## 2022-04-25 NOTE — SUBJECTIVE & OBJECTIVE
Interval History: NAEON    Review of Systems   Unable to perform ROS: Other (cognitive impairment)     Objective:     Vital Signs (Most Recent):  Temp: 98 °F (36.7 °C) (04/25/22 0729)  Pulse: (!) 59 (04/25/22 0729)  Resp: 18 (04/25/22 0729)  BP: (!) 94/50 (04/25/22 0729)  SpO2: 98 % (04/25/22 0729)   Vital Signs (24h Range):  Temp:  [97 °F (36.1 °C)-99 °F (37.2 °C)] 98 °F (36.7 °C)  Pulse:  [] 59  Resp:  [18-20] 18  SpO2:  [96 %-99 %] 98 %  BP: ()/(50-65) 94/50     Weight: 44 kg (97 lb)  Body mass index is 18.94 kg/m².    Intake/Output Summary (Last 24 hours) at 4/25/2022 0849  Last data filed at 4/25/2022 0610  Gross per 24 hour   Intake 900 ml   Output 3250 ml   Net -2350 ml        Physical Exam  Constitutional:       General: She is not in acute distress.  HENT:      Head: Atraumatic.   Eyes:      Conjunctiva/sclera: Conjunctivae normal.   Cardiovascular:      Rate and Rhythm: Normal rate and regular rhythm.      Heart sounds: Normal heart sounds. No murmur heard.  Pulmonary:      Effort: Pulmonary effort is normal.      Breath sounds: Normal breath sounds. No wheezing.   Abdominal:      General: Bowel sounds are normal. There is no distension.      Palpations: Abdomen is soft.      Tenderness: There is no abdominal tenderness.   Genitourinary:     Comments: Suprapubic catheter in place with yellow color urine output.  Musculoskeletal:         General: No deformity. Normal range of motion.      Cervical back: Neck supple.   Neurological:      Mental Status: She is alert and oriented to person, place, and time.       Significant Labs: All pertinent labs within the past 24 hours have been reviewed.    Significant Imaging: I have reviewed all pertinent imaging results/findings within the past 24 hours.

## 2022-04-26 LAB
ANION GAP SERPL CALC-SCNC: 11 MMOL/L (ref 8–16)
BASOPHILS # BLD AUTO: 0.06 K/UL (ref 0–0.2)
BASOPHILS NFR BLD: 0.7 % (ref 0–1.9)
BUN SERPL-MCNC: 31 MG/DL (ref 6–20)
CALCIUM SERPL-MCNC: 9.4 MG/DL (ref 8.7–10.5)
CHLORIDE SERPL-SCNC: 105 MMOL/L (ref 95–110)
CO2 SERPL-SCNC: 25 MMOL/L (ref 23–29)
CREAT SERPL-MCNC: 1.8 MG/DL (ref 0.5–1.4)
DIFFERENTIAL METHOD: ABNORMAL
EOSINOPHIL # BLD AUTO: 0.1 K/UL (ref 0–0.5)
EOSINOPHIL NFR BLD: 0.9 % (ref 0–8)
ERYTHROCYTE [DISTWIDTH] IN BLOOD BY AUTOMATED COUNT: 13.3 % (ref 11.5–14.5)
EST. GFR  (AFRICAN AMERICAN): 37 ML/MIN/1.73 M^2
EST. GFR  (NON AFRICAN AMERICAN): 32 ML/MIN/1.73 M^2
GLUCOSE SERPL-MCNC: 87 MG/DL (ref 70–110)
HCT VFR BLD AUTO: 42.3 % (ref 37–48.5)
HGB BLD-MCNC: 13.9 G/DL (ref 12–16)
IMM GRANULOCYTES # BLD AUTO: 0.08 K/UL (ref 0–0.04)
IMM GRANULOCYTES NFR BLD AUTO: 0.9 % (ref 0–0.5)
LYMPHOCYTES # BLD AUTO: 1.8 K/UL (ref 1–4.8)
LYMPHOCYTES NFR BLD: 21.3 % (ref 18–48)
MAGNESIUM SERPL-MCNC: 2.2 MG/DL (ref 1.6–2.6)
MCH RBC QN AUTO: 33.1 PG (ref 27–31)
MCHC RBC AUTO-ENTMCNC: 32.9 G/DL (ref 32–36)
MCV RBC AUTO: 101 FL (ref 82–98)
MONOCYTES # BLD AUTO: 0.5 K/UL (ref 0.3–1)
MONOCYTES NFR BLD: 6.4 % (ref 4–15)
NEUTROPHILS # BLD AUTO: 5.9 K/UL (ref 1.8–7.7)
NEUTROPHILS NFR BLD: 69.8 % (ref 38–73)
NRBC BLD-RTO: 0 /100 WBC
PLATELET # BLD AUTO: 471 K/UL (ref 150–450)
PMV BLD AUTO: 9.3 FL (ref 9.2–12.9)
POTASSIUM SERPL-SCNC: 3.8 MMOL/L (ref 3.5–5.1)
RBC # BLD AUTO: 4.2 M/UL (ref 4–5.4)
SODIUM SERPL-SCNC: 141 MMOL/L (ref 136–145)
WBC # BLD AUTO: 8.44 K/UL (ref 3.9–12.7)

## 2022-04-26 PROCEDURE — 96372 THER/PROPH/DIAG INJ SC/IM: CPT | Performed by: INTERNAL MEDICINE

## 2022-04-26 PROCEDURE — 97535 SELF CARE MNGMENT TRAINING: CPT

## 2022-04-26 PROCEDURE — 25000003 PHARM REV CODE 250: Performed by: INTERNAL MEDICINE

## 2022-04-26 PROCEDURE — 97530 THERAPEUTIC ACTIVITIES: CPT | Mod: CQ

## 2022-04-26 PROCEDURE — 99226 PR SUBSEQUENT OBSERVATION CARE,LEVEL III: CPT | Mod: ,,, | Performed by: INTERNAL MEDICINE

## 2022-04-26 PROCEDURE — 97116 GAIT TRAINING THERAPY: CPT | Mod: CQ

## 2022-04-26 PROCEDURE — 85025 COMPLETE CBC W/AUTO DIFF WBC: CPT | Performed by: INTERNAL MEDICINE

## 2022-04-26 PROCEDURE — 80048 BASIC METABOLIC PNL TOTAL CA: CPT | Performed by: INTERNAL MEDICINE

## 2022-04-26 PROCEDURE — 63600175 PHARM REV CODE 636 W HCPCS: Performed by: INTERNAL MEDICINE

## 2022-04-26 PROCEDURE — 99226 PR SUBSEQUENT OBSERVATION CARE,LEVEL III: ICD-10-PCS | Mod: ,,, | Performed by: INTERNAL MEDICINE

## 2022-04-26 PROCEDURE — 83735 ASSAY OF MAGNESIUM: CPT | Performed by: INTERNAL MEDICINE

## 2022-04-26 PROCEDURE — 36415 COLL VENOUS BLD VENIPUNCTURE: CPT | Performed by: INTERNAL MEDICINE

## 2022-04-26 PROCEDURE — G0378 HOSPITAL OBSERVATION PER HR: HCPCS

## 2022-04-26 RX ADMIN — OXYBUTYNIN CHLORIDE 10 MG: 5 TABLET, EXTENDED RELEASE ORAL at 09:04

## 2022-04-26 RX ADMIN — HEPARIN SODIUM 5000 UNITS: 5000 INJECTION INTRAVENOUS; SUBCUTANEOUS at 04:04

## 2022-04-26 RX ADMIN — Medication 1 TABLET: at 09:04

## 2022-04-26 RX ADMIN — THERA TABS 1 TABLET: TAB at 09:04

## 2022-04-26 RX ADMIN — Medication 1 TABLET: at 04:04

## 2022-04-26 RX ADMIN — FOLIC ACID 1 MG: 1 TABLET ORAL at 09:04

## 2022-04-26 RX ADMIN — LEVOTHYROXINE SODIUM 50 MCG: 50 TABLET ORAL at 06:04

## 2022-04-26 RX ADMIN — TRAMADOL HYDROCHLORIDE 50 MG: 50 TABLET, COATED ORAL at 12:04

## 2022-04-26 RX ADMIN — OXYCODONE HYDROCHLORIDE AND ACETAMINOPHEN 500 MG: 500 TABLET ORAL at 09:04

## 2022-04-26 RX ADMIN — ATORVASTATIN CALCIUM 10 MG: 10 TABLET, FILM COATED ORAL at 09:04

## 2022-04-26 RX ADMIN — HEPARIN SODIUM 5000 UNITS: 5000 INJECTION INTRAVENOUS; SUBCUTANEOUS at 09:04

## 2022-04-26 RX ADMIN — HEPARIN SODIUM 5000 UNITS: 5000 INJECTION INTRAVENOUS; SUBCUTANEOUS at 06:04

## 2022-04-26 NOTE — SUBJECTIVE & OBJECTIVE
Interval History: Patient is awake and alert this morning.  She is mostly nonverbal.  Looks comfortable in bed without distress.  No adverse events reported overnight.  Stable and awaiting Rehab.    Review of Systems   Unable to perform ROS: Patient nonverbal   Constitutional:  Negative for chills and fever.   Respiratory:  Negative for shortness of breath.    Objective:     Vital Signs (Most Recent):  Temp: 97.3 °F (36.3 °C) (04/26/22 1112)  Pulse: 77 (04/26/22 1112)  Resp: 16 (04/26/22 1217)  BP: 108/61 (04/26/22 1112)  SpO2: 97 % (04/26/22 1112) Vital Signs (24h Range):  Temp:  [97.3 °F (36.3 °C)-98.6 °F (37 °C)] 97.3 °F (36.3 °C)  Pulse:  [64-77] 77  Resp:  [16-20] 16  SpO2:  [97 %-100 %] 97 %  BP: (103-124)/(60-84) 108/61     Weight: 44 kg (97 lb)  Body mass index is 18.94 kg/m².    Intake/Output Summary (Last 24 hours) at 4/26/2022 1535  Last data filed at 4/26/2022 0600  Gross per 24 hour   Intake 780 ml   Output 1475 ml   Net -695 ml        Physical Exam  Constitutional:       General: She is not in acute distress.     Appearance: She is normal weight. She is ill-appearing (chronically).   HENT:      Head: Normocephalic and atraumatic.      Right Ear: External ear normal.      Left Ear: External ear normal.      Nose: Nose normal.      Mouth/Throat:      Mouth: Mucous membranes are moist.      Pharynx: No oropharyngeal exudate.   Eyes:      General: No scleral icterus.     Pupils: Pupils are equal, round, and reactive to light.   Cardiovascular:      Rate and Rhythm: Normal rate and regular rhythm.      Pulses: Normal pulses.      Heart sounds: Normal heart sounds.   Pulmonary:      Effort: Pulmonary effort is normal. No respiratory distress.      Breath sounds: Normal breath sounds. No wheezing or rales.   Abdominal:      General: Bowel sounds are normal. There is no distension.      Tenderness: There is no abdominal tenderness.   Genitourinary:     Comments: SPT with clear yellow urine in  bag  Musculoskeletal:         General: Normal range of motion.      Cervical back: Normal range of motion and neck supple.      Right lower leg: No edema.      Left lower leg: No edema.   Skin:     General: Skin is warm and dry.   Neurological:      Mental Status: She is alert. Mental status is at baseline.   Psychiatric:         Mood and Affect: Mood normal.         Behavior: Behavior normal.       Significant Labs: All pertinent labs within the past 24 hours have been reviewed.    Significant Imaging: I have reviewed all pertinent imaging results/findings within the past 24 hours.

## 2022-04-26 NOTE — ASSESSMENT & PLAN NOTE
Cr is 1.9 on admission, which is better than recent measures.  Stable at 1.8 today  - avoid nephrotoxins   - renally dose meds

## 2022-04-26 NOTE — PROGRESS NOTES
[facility] Medicine  Progress Note    Patient Name: Shannan Medrano  MRN: [id number]  Patient Class: OP- Observation   Admission Date: 4/12/2022  Length of Stay: 0 days  Attending Physician: Jermaine Maki MD  Primary Care Provider: Primary Doctor No        Subjective:     Principal Problem:Pelvic ring fracture      HPI:  Ms. Shannan Medrano is a 51 y.o. female, with PMH of Down Syndrome, T2DM, HLD, CKD-3, Cardiomyopathy, urinary retention s/p suprapubic catheter placement, who presented to [facility] ED on 4/12/22 due to left hip & right buttocks pain after falling on Saturday. She further notes chronic lower abdominal pain. Per her caregiver's report, she fell down 4 steps. She states she has balance issues, but can walk, but has been unable to do so since her fall due to pain with bearing weight. She was evaluated in the ED with labs showing normal H&H, and Cr of 1.9. X-ray of the hips showed possible fracture of the right superior ramus. A CT of the pelvis showed a nondisplaced fracture of the right pubic ramus adjacent tot he symphysis. There was also a contralateral left sacral nondisplaced fracture of the pelvic ring. X-ray of the lumbar spine showed wedging of L1 vertebral body which may be remote or normal variant as well as Gr 1 anterolisthesis of L4 on L5. She was placed on OBS.       Overview/Hospital Course:  Patient presented after a recent fall with pain.  Her imaging showed right pubic ramus fracture and left pelvic ring fracture.  Ortho consulted and patient non-surgical.  Recommended partial WB on LLE.  Seen by PT and recommended for Rehab.      Interval History: Patient is awake and alert this morning.  She is mostly nonverbal.  Looks comfortable in bed without distress.  No adverse events reported overnight.  Stable and awaiting Rehab.    Review of Systems   Unable to perform ROS: Patient nonverbal   Constitutional:  Negative for chills and fever.   Respiratory:  Negative  for shortness of breath.    Objective:     Vital Signs (Most Recent):  Temp: 97.3 °F (36.3 °C) (04/26/22 1112)  Pulse: 77 (04/26/22 1112)  Resp: 16 (04/26/22 1217)  BP: 108/61 (04/26/22 1112)  SpO2: 97 % (04/26/22 1112) Vital Signs (24h Range):  Temp:  [97.3 °F (36.3 °C)-98.6 °F (37 °C)] 97.3 °F (36.3 °C)  Pulse:  [64-77] 77  Resp:  [16-20] 16  SpO2:  [97 %-100 %] 97 %  BP: (103-124)/(60-84) 108/61     Weight: 44 kg (97 lb)  Body mass index is 18.94 kg/m².    Intake/Output Summary (Last 24 hours) at 4/26/2022 1535  Last data filed at 4/26/2022 0600  Gross per 24 hour   Intake 780 ml   Output 1475 ml   Net -695 ml        Physical Exam  Constitutional:       General: She is not in acute distress.     Appearance: She is normal weight. She is ill-appearing (chronically).   HENT:      Head: Normocephalic and atraumatic.      Right Ear: External ear normal.      Left Ear: External ear normal.      Nose: Nose normal.      Mouth/Throat:      Mouth: Mucous membranes are moist.      Pharynx: No oropharyngeal exudate.   Eyes:      General: No scleral icterus.     Pupils: Pupils are equal, round, and reactive to light.   Cardiovascular:      Rate and Rhythm: Normal rate and regular rhythm.      Pulses: Normal pulses.      Heart sounds: Normal heart sounds.   Pulmonary:      Effort: Pulmonary effort is normal. No respiratory distress.      Breath sounds: Normal breath sounds. No wheezing or rales.   Abdominal:      General: Bowel sounds are normal. There is no distension.      Tenderness: There is no abdominal tenderness.   Genitourinary:     Comments: SPT with clear yellow urine in bag  Musculoskeletal:         General: Normal range of motion.      Cervical back: Normal range of motion and neck supple.      Right lower leg: No edema.      Left lower leg: No edema.   Skin:     General: Skin is warm and dry.   Neurological:      Mental Status: She is alert. Mental status is at baseline.   Psychiatric:         Mood and Affect:  Mood normal.         Behavior: Behavior normal.       Significant Labs: All pertinent labs within the past 24 hours have been reviewed.    Significant Imaging: I have reviewed all pertinent imaging results/findings within the past 24 hours.      Assessment/Plan:      * Pelvic ring fracture and Pubic Ramus Fracture  Patient presented after a recent fall with pain.  Her imaging showed right pubic ramus fracture and left pelvic ring fracture.  Ortho consulted and patient non-surgical.  Recommended partial WB on LLE.  -PRN pain meds  -PT consulted - recommends rehab  -DVT prophylaxis with SQH for now      HLD (hyperlipidemia)  -Continue Atorvastatin 10 mg QD       CKD (chronic kidney disease) stage 3, GFR 30-59 ml/min  Cr is 1.9 on admission, which is better than recent measures.  Stable at 1.8 today  - avoid nephrotoxins   - renally dose meds       Type 2 diabetes mellitus  Home Meds:  Metformin 500mg  A1C 5.1 on admission  Glucose well controlled  -Hold Metformin  -Diabetic diet          Down syndrome  Noted      VTE Risk Mitigation (From admission, onward)         Ordered     heparin (porcine) injection 5,000 Units  Every 8 hours         04/13/22 1523     IP VTE LOW RISK PATIENT  Once         04/12/22 2327     Place sequential compression device  Until discontinued         04/12/22 2327                Discharge Planning   COMPA:      Code Status: Full Code   Is the patient medically ready for discharge?:     Reason for patient still in hospital (select all that apply): Patient trending condition, Treatment and Consult recommendations  Discharge Plan A: Rehab   Discharge Delays:  (pending medical clearance)              Jermaine Maki MD  Department of Hospital Medicine   [facility] Surg ([facility])

## 2022-04-26 NOTE — PT/OT/SLP PROGRESS
Physical Therapy Treatment    Patient Name:  Shannan Medrano   MRN:  [id number]    Recommendations:     Discharge Recommendations:  rehabilitation facility   Discharge Equipment Recommendations: bedside commode (Deng RW and deng wheelchair.)   Barriers to discharge: Decreased caregiver support    Assessment:     Shannan Medrano is a 51 y.o. female admitted with a medical diagnosis of Pelvic ring fracture.  She presents with the following impairments/functional limitations:  impaired cognition.    Sit>stand with no AD and modA x 2 from bed; with RW and modA x 2 from BSC, chair  Toilet transfer with no AD and modA x 2, using stand/pivot  BSC>recliner chair with RW and modA x 2, step transfer  Recliner chair>wheelchair with RW and modA x 2, step transfer  Amb 2 x 4' with RW and modA x 2, difficult to gauge WB status of B LE, pt WB through BUE on RW   Pt progressing functional mobility, performed several transfers and ambulated several feet with decreased level of assist  Rec Rehab    Prior to admission pt was independent with mobility and there is expectation of returning to prior level of function to maintain independence avoiding readmission. Pt is at high risk of unplanned readmission due to fall risk and inability ambulate functional distances d/t pain. The lower level of care cannot provide total interdisciplinary approach needed. Pt is able to tolerate 3 hours of daily therapy. Pt is pleasant and motivated to return to prior level of function.     Rehab Prognosis: Good; patient would benefit from acute skilled PT services to address these deficits and reach maximum level of function.    Recent Surgery: * No surgery found *      Plan:     During this hospitalization, patient to be seen 6 x/week to address the identified rehab impairments via gait training, therapeutic activities, therapeutic exercises, neuromuscular re-education and progress toward the following goals:    · Plan of Care Expires:   05/14/22    Subjective     Chief Complaint: pain  Patient/Family Comments/goals: pt agreeable to therapy  Pain/Comfort:  · Pain Rating 1: other (see comments) (pain reported, unrated)  · Location - Side 1: Bilateral  · Location - Orientation 1: generalized  · Location 1: hip (and pubic area)  · Pain Addressed 1: Reposition, Distraction, Cessation of Activity  · Pain Rating Post-Intervention 1: other (see comments) (pain with weight bearing, no crying out)      Objective:     Communicated with nurse Todd prior to session.  Patient found HOB elevated with  (supra-pubic catheter) upon PT entry to room.     General Precautions: Standard, contact, fall   Orthopedic Precautions:LLE partial weight bearing   Braces:    Respiratory Status: Room air     Functional Mobility:  · Bed Mobility:     · Supine to Sit: maximal assistance and of 2 persons  · Transfers:     · Sit to Stand:  moderate assistance and of 2 persons with no AD and rolling walker  · Chair to Wheelchair: moderate assistance and of 2 persons with  rolling walker  using  Step Transfer  · Toilet Transfer: moderate assistance and of 2 persons with  no AD  using  Stand Pivot  · Gait: 2 x 4' with RW and modA x 2, pt WB through BUE on RW, difficult to gauge WB status of B LE      AM-PAC 6 CLICK MOBILITY  Turning over in bed (including adjusting bedclothes, sheets and blankets)?: 3  Sitting down on and standing up from a chair with arms (e.g., wheelchair, bedside commode, etc.): 2  Moving from lying on back to sitting on the side of the bed?: 2  Moving to and from a bed to a chair (including a wheelchair)?: 2  Need to walk in hospital room?: 2  Climbing 3-5 steps with a railing?: 1  Basic Mobility Total Score: 12       Therapeutic Activities and Exercises:   Gait and transfer training as noted    Patient left up in chair with all lines intact, call button in reach, chair alarm on, nurse Todd notified and lunch served..    GOALS:   Multidisciplinary Problems      Physical Therapy Goals        Problem: Physical Therapy    Goal Priority Disciplines Outcome Goal Variances Interventions   Physical Therapy Goal     PT, PT/OT Ongoing, Progressing     Description: Goals to be met by: 22    Patient will increase functional independence with mobility by performin. Supine<>sit with SBA without use of hospital bed features.   2. Sit<>stand with SBA with RW.  3. Gait x 50 feet with SBA with RW maintaining PWB of LLE.                   Time Tracking:     PT Received On: 22  PT Start Time: 1144     PT Stop Time: 1211  PT Total Time (min): 27 min     Billable Minutes: Gait Training 12 and Therapeutic Activity 15   Co-treat with OT due to complex nature of patient, to insure patient safety, and to prevent injury to patient and caregivers, requiring intervention of two skilled therapists.    Treatment Type: Treatment  PT/PTA: PTA     PTA Visit Number: 1     2022

## 2022-04-26 NOTE — ASSESSMENT & PLAN NOTE
Home Meds:  Metformin 500mg  A1C 5.1 on admission  Glucose well controlled  -Hold Metformin  -Diabetic diet

## 2022-04-26 NOTE — PT/OT/SLP PROGRESS
Occupational Therapy   Treatment    Name: Shannan Medrano  MRN: [id number]  Admitting Diagnosis:  Pelvic ring fracture       Recommendations:     Discharge Recommendations: rehabilitation facility  Discharge Equipment Recommendations:   (TBD at next level of care)  Barriers to discharge:   (current functional level)    Assessment:     Shannan Medrano is a 51 y.o. female with a medical diagnosis of Pelvic ring fracture.  She presents with pain in pelvic region. Performance deficits affecting function are impaired functional mobilty, impaired balance, impaired cognition, impaired self care skills, decreased safety awareness, pain, decreased lower extremity function, impaired endurance, decreased coordination.  Pt agreeable to participating in therapy upon arrival to room.  Pt impacted by pain with all standing, transfers, and functional mobility; however, able to shift weight and mobilize better than previous session yesterday.  Mod A, RW with assist of 2 required to perform sit <> stand transfer, stand pivot transfer, and ambulation.  Pt required Total A, RW to perform hygiene care in standing position after bowel movement from [facility].     Overall, pt tolerated therapy well.  She is making progress towards goals and would continue to benefit from skilled OT services to address problems listed above and increase independence with ADLs.  Rehab is recommended upon d/c from acute care to further address deficits and help pt improve overall functional independence.         Rehab Prognosis:  Good; patient would benefit from acute skilled OT services to address these deficits and reach maximum level of function.       Plan:     Patient to be seen 5 x/week to address the above listed problems via self-care/home management, therapeutic activities, therapeutic exercises  · Plan of Care Expires: 05/14/22  · Plan of Care Reviewed with: patient    Subjective     Pain/Comfort:  · Pain Rating 1:  (pt reported pain in pelvic region, pt able  to point to area; unable to rate)  · Pain Rating Post-Intervention 1:  (pain in pelvic region noted with all movement)    Objective:     Communicated with: RN (Peyton) prior to session.  Patient found HOB elevated with suprapubic catheter upon OT entry to room.    *After transferring to chair at end of session pt indicated that she wanted to sit up in wheelchair instead.    General Precautions: Standard, fall, contact   Orthopedic Precautions:LLE partial weight bearing   Braces: N/A  Respiratory Status: Room air     Occupational Performance:     Bed Mobility:    · Supine <> Sit: Max A-Total (decreased task initiation observed)  · Scooting: Max A seated towards EOB; CGA-Min A towards back of chair    Functional Mobility/Transfers:  · Sit <> Stand:   · Mod A, no AD (with assist of 2) x 1 trial from EOB  · Mod A, RW (with assist of 2) x 1 trial from BSC  · Mod A, RW (with assist of 2) x 1 trial from chair   · Toilet: Mod A (with assist of 2) via stand pivot from bed <> BSC  · Functional Mobility: Pt walked ~3 ft x 2 trials with Mod A, RW, and assist of 2.  · 1st trial: BSC <> chair  · 2nd trial: chair <> wheelchair  · Notes: Pain indicated with movement; some difficultly shifting weight, but able to take steps    Activities of Daily Living:  · Upper Body Dressing: Total A for donning glasses while seated up in chair.  · Visual model provided, but no task initiation noted  · Toileting: Total A, RW for performing neela care in standing position after bowel movement from BSC  · Pt stood for ~30 seconds during task; Mod A, RW for standing balance      AMP[facility] 6 Click ADL: 16    Treatment & Education:  *Session focused on promoting increased endurance, strength, balance, coordination, and ROM needed for ADLs, functional transfers, and functional mobility as part of daily routine.    -Pt performed:  -sit <> stand transfer from EOB and BSC  -stand pivot from bed <> BSC  *Pt performed toileting from BSC and stood for ~30 seconds  during hygiene care  *Pt ambulated short distance within room x 2 trials; cues and assist required for RW management and shifting weight  *POC reviewed with pt    Patient left up in wheelchair with all lines intact, call button in reach (clipped to gown), chair alarm on, lunch tray set up, and RN (Peyton) notifiedEducation:      GOALS:   Multidisciplinary Problems     Occupational Therapy Goals        Problem: Occupational Therapy    Goal Priority Disciplines Outcome Interventions   Occupational Therapy Goal     OT, PT/OT Ongoing, Progressing    Description: Goals to be met by: 4/28/22    Patient will increase functional independence with ADLs by performing:    LE Dressing with Supervision.  Grooming while seated at EOB with SBA.  Toileting from bedside commode with Min A for hygiene and clothing management.   Toilet transfer to bedside commode with Min A.    Completed Goals:   LE Dressing with Minimal Assistance. 4/22/2022 (for socks)                     Time Tracking:     OT Date of Treatment: 04/26/22  OT Start Time: 1146  OT Stop Time: 1212  OT Total Time (min): 26 min    Billable Minutes:Self Care/Home Management 26   *Co-tx with PTA    OT/MYNOR: OT          4/26/2022

## 2022-04-27 PROCEDURE — 97112 NEUROMUSCULAR REEDUCATION: CPT | Mod: CQ

## 2022-04-27 PROCEDURE — 96372 THER/PROPH/DIAG INJ SC/IM: CPT | Performed by: INTERNAL MEDICINE

## 2022-04-27 PROCEDURE — 99226 PR SUBSEQUENT OBSERVATION CARE,LEVEL III: ICD-10-PCS | Mod: ,,, | Performed by: INTERNAL MEDICINE

## 2022-04-27 PROCEDURE — 97535 SELF CARE MNGMENT TRAINING: CPT

## 2022-04-27 PROCEDURE — 97530 THERAPEUTIC ACTIVITIES: CPT

## 2022-04-27 PROCEDURE — G0378 HOSPITAL OBSERVATION PER HR: HCPCS

## 2022-04-27 PROCEDURE — 94761 N-INVAS EAR/PLS OXIMETRY MLT: CPT

## 2022-04-27 PROCEDURE — 99226 PR SUBSEQUENT OBSERVATION CARE,LEVEL III: CPT | Mod: ,,, | Performed by: INTERNAL MEDICINE

## 2022-04-27 PROCEDURE — 97116 GAIT TRAINING THERAPY: CPT | Mod: CQ

## 2022-04-27 PROCEDURE — 63600175 PHARM REV CODE 636 W HCPCS: Performed by: INTERNAL MEDICINE

## 2022-04-27 PROCEDURE — 25000003 PHARM REV CODE 250: Performed by: INTERNAL MEDICINE

## 2022-04-27 RX ADMIN — HEPARIN SODIUM 5000 UNITS: 5000 INJECTION INTRAVENOUS; SUBCUTANEOUS at 09:04

## 2022-04-27 RX ADMIN — LEVOTHYROXINE SODIUM 50 MCG: 50 TABLET ORAL at 05:04

## 2022-04-27 RX ADMIN — Medication 1 TABLET: at 05:04

## 2022-04-27 RX ADMIN — ATORVASTATIN CALCIUM 10 MG: 10 TABLET, FILM COATED ORAL at 09:04

## 2022-04-27 RX ADMIN — HEPARIN SODIUM 5000 UNITS: 5000 INJECTION INTRAVENOUS; SUBCUTANEOUS at 03:04

## 2022-04-27 RX ADMIN — TRAMADOL HYDROCHLORIDE 50 MG: 50 TABLET, COATED ORAL at 03:04

## 2022-04-27 RX ADMIN — OXYBUTYNIN CHLORIDE 10 MG: 5 TABLET, EXTENDED RELEASE ORAL at 09:04

## 2022-04-27 RX ADMIN — FOLIC ACID 1 MG: 1 TABLET ORAL at 09:04

## 2022-04-27 RX ADMIN — THERA TABS 1 TABLET: TAB at 09:04

## 2022-04-27 RX ADMIN — TRAMADOL HYDROCHLORIDE 50 MG: 50 TABLET, COATED ORAL at 11:04

## 2022-04-27 RX ADMIN — OXYCODONE HYDROCHLORIDE AND ACETAMINOPHEN 500 MG: 500 TABLET ORAL at 09:04

## 2022-04-27 RX ADMIN — Medication 1 TABLET: at 09:04

## 2022-04-27 RX ADMIN — HEPARIN SODIUM 5000 UNITS: 5000 INJECTION INTRAVENOUS; SUBCUTANEOUS at 05:04

## 2022-04-27 NOTE — ASSESSMENT & PLAN NOTE
Cr is 1.9 on admission, which is better than recent measures.  Stable at 1.8 yesterday  - avoid nephrotoxins   - renally dose meds

## 2022-04-27 NOTE — PLAN OF CARE
BART spoke to Letha at [facility] facility for Ms Campbell. Letha providet name of nurse at Pt's facility, Carmen Mcdonald, [phone].    CM spoke to Carmen regarding therapy and DME needed. Therapy recommends a wheelchair and RW, CM unable to order both, so therapy recommends the wheelchair. [facility] will purchase a RW for pt.    Carmen requested discharge orders and plan be emailed to her at [email or web address].  CM informed Carmen and Letha that pt will be ready for discharge on tomorrow, 4/28/22.    CM will arrange transportation thru [employer] for wc van.    BART to follow.   04/27/22 1442   Discharge Reassessment   Assessment Type Discharge Planning Reassessment   Did the patient's condition or plan change since previous assessment? No   Discharge Plan discussed with: Caregiver   Communicated COMPA with patient/caregiver Yes

## 2022-04-27 NOTE — PT/OT/SLP PROGRESS
"Occupational Therapy   Treatment    Name: Shannan Medrano  MRN: [id number]  Admitting Diagnosis:  Pelvic ring fracture       Recommendations:     Discharge Recommendations: rehabilitation facility  Discharge Equipment Recommendations:   (TBD pending progress)  Barriers to discharge:   (current functional level)    Assessment:   Requiring MOD A of 2 persons for functional transfers and household level ambulation as well as requiring increased assist with RW management while negotiating turns.  Pt. With decreased follow through of commands for hand placement during bed mobility task.  TOTAL A for cleaning back neela region in stance with BUE supported.  Progressing towards goals.  Continued recommendation of post acute OT in IRF.  To benefit from continued acute care OT services to increase independence in self-care/functional transfers.  Continue POC.     Shannan Medrano is a 51 y.o. female with a medical diagnosis of Pelvic ring fracture.  She presents with below deficits decreasing independence in self-care/functional transfers. Performance deficits affecting function are weakness, impaired endurance, impaired self care skills, impaired functional mobilty, gait instability, decreased lower extremity function, orthopedic precautions, decreased ROM, pain, decreased safety awareness, impaired balance, impaired cognition.     Rehab Prognosis:  Good; patient would benefit from acute skilled OT services to address these deficits and reach maximum level of function.       Plan:     Patient to be seen 5 x/week to address the above listed problems via self-care/home management, therapeutic activities, therapeutic exercises  · Plan of Care Expires: 05/14/22  · Plan of Care Reviewed with: patient    Subjective     Pain/Comfort:  · Pain Rating 1:  (With facial grimace and verbalizing "hurt" when standing.)  · Location - Orientation 1: generalized  · Location 1:  (pubic area)  · Pain Addressed 1: Reposition, Distraction, Cessation of " Activity, Nurse notified  · Pain Rating Post-Intervention 1:  (No signs of pain at rest)    Objective:     Communicated with: Cora Parekh RN prior to session.  Patient found HOB elevated with  (suprapubic catheter) upon OT entry to room.    General Precautions: Standard, fall   Orthopedic Precautions:LLE partial weight bearing   Braces: N/A  Respiratory Status: Room air     Occupational Performance:     Bed Mobility:    Supine>sit with MAX A and increased cuing and HOHA for hand placement during task though Pt. Placing arms around therapist.  Requiring assist for scooting towards EOB once seated.     Functional Mobility/Transfers:  · Sit>stand EOB>RW with MOD A of 2.  Step transfer bed>W/C with MOD A of 2 with use of RW.  Ambulating short household distance W/C<>BSC with seated rest break once at end destination with MOD A of 2 persons with RW use though requiring increased assist with RW management while negotiating turns during step transfer to BSC.      Activities of Daily Living:  · Having small BM while seated at BSC and requiring increased assist while in stance for cleaning back neela region with BUE supported at RW.  Pt. Making sign with fist after expressing need to toilet once asked.       [facility] 6 Click ADL: 14    Treatment & Education:  Supine>sit with MAX A and increased cuing and HOHA for hand placement during task though Pt. Placing arms around therapist.  Requiring assist for scooting towards EOB once seated.   · Sit>stand EOB>RW with MOD A of 2.  Step transfer bed>W/C with MOD A of 2 with use of RW.  Ambulating short household distance W/C<>BSC with seated rest break once at end destination with MOD A of 2 persons with RW use though requiring increased assist with RW management while negotiating turns during step transfer to BSC.    · Having small BM while seated at BSC and requiring increased assist while in stance for cleaning back neela region with BUE supported at RW.  Pt. Making sign with  fist after expressing need to toilet once asked.     Patient left up in W/C3 with all lines intact, call button in reach, chair alarm on and nursing notifiedEducation:      GOALS:   Multidisciplinary Problems     Occupational Therapy Goals        Problem: Occupational Therapy    Goal Priority Disciplines Outcome Interventions   Occupational Therapy Goal     OT, PT/OT Ongoing, Progressing    Description: Goals to be met by: 4/28/22    Patient will increase functional independence with ADLs by performing:    LE Dressing with Supervision.  Grooming while seated at EOB with SBA.  Toileting from bedside commode with Min A for hygiene and clothing management.   Toilet transfer to bedside commode with Min A.    Completed Goals:   LE Dressing with Minimal Assistance. 4/22/2022 (for socks)                     Time Tracking:     OT Date of Treatment: 04/27/22  OT Start Time: 1547  OT Stop Time: 1617  OT Total Time (min): 30 min (overlap with PTA)    Overlap with PTA for portions of session due to complex nature of patient, tolerance for therapeutic modalities, and safety with mobility to decrease fall risk for patient and caregiver injury requiring two skilled therapists to provide interventions.      Billable Minutes:Self Care/Home Management 15  Therapeutic Activity 15    OT/MYNOR: OT          4/27/2022

## 2022-04-27 NOTE — PROGRESS NOTES
[facility] Surg [facility] Medicine  Progress Note    Patient Name: Shannan Medrano  MRN: [id number]  Patient Class: OP- Observation   Admission Date: 4/12/2022  Length of Stay: 0 days  Attending Physician: Jermaine Maki MD  Primary Care Provider: Primary Doctor No        Subjective:     Principal Problem:Pelvic ring fracture      HPI:  Ms. Shannan Medrano is a 51 y.o. female, with PMH of Down Syndrome, T2DM, HLD, CKD-3, Cardiomyopathy, urinary retention s/p suprapubic catheter placement, who presented to [facility] ED on 4/12/22 due to left hip & right buttocks pain after falling on Saturday. She further notes chronic lower abdominal pain. Per her caregiver's report, she fell down 4 steps. She states she has balance issues, but can walk, but has been unable to do so since her fall due to pain with bearing weight. She was evaluated in the ED with labs showing normal H&H, and Cr of 1.9. X-ray of the hips showed possible fracture of the right superior ramus. A CT of the pelvis showed a nondisplaced fracture of the right pubic ramus adjacent tot he symphysis. There was also a contralateral left sacral nondisplaced fracture of the pelvic ring. X-ray of the lumbar spine showed wedging of L1 vertebral body which may be remote or normal variant as well as Gr 1 anterolisthesis of L4 on L5. She was placed on OBS.       Overview/Hospital Course:  Patient presented after a recent fall with pain.  Her imaging showed right pubic ramus fracture and left pelvic ring fracture.  Ortho consulted and patient non-surgical.  Recommended partial WB on LLE.  Seen by PT and recommended for Rehab.      Interval History: Patient is sleeping, but easily awakened by voice.  She is mostly nonverbal.  Looks comfortable in bed without distress.  No adverse events reported overnight.  Stable and awaiting Rehab.    Review of Systems   Unable to perform ROS: Patient nonverbal   Constitutional:  Negative for chills and fever.   Respiratory:   Negative for shortness of breath.    Objective:     Vital Signs (Most Recent):  Temp: 98.5 °F (36.9 °C) (04/27/22 0437)  Pulse: 68 (04/27/22 0437)  Resp: 16 (04/27/22 0437)  BP: (!) 95/51 (04/27/22 0437)  SpO2: 97 % (04/27/22 0437) Vital Signs (24h Range):  Temp:  [97.3 °F (36.3 °C)-98.5 °F (36.9 °C)] 98.5 °F (36.9 °C)  Pulse:  [62-77] 68  Resp:  [16-18] 16  SpO2:  [97 %-99 %] 97 %  BP: ()/(51-69) 95/51     Weight: 44 kg (97 lb)  Body mass index is 18.94 kg/m².    Intake/Output Summary (Last 24 hours) at 4/27/2022 0800  Last data filed at 4/27/2022 0600  Gross per 24 hour   Intake 1080 ml   Output 400 ml   Net 680 ml        Physical Exam  Constitutional:       General: She is not in acute distress.     Appearance: She is normal weight. She is ill-appearing (chronically).   HENT:      Head: Normocephalic and atraumatic.      Right Ear: External ear normal.      Left Ear: External ear normal.      Nose: Nose normal.      Mouth/Throat:      Mouth: Mucous membranes are moist.      Pharynx: No oropharyngeal exudate.   Eyes:      General: No scleral icterus.     Pupils: Pupils are equal, round, and reactive to light.   Cardiovascular:      Rate and Rhythm: Normal rate and regular rhythm.      Pulses: Normal pulses.      Heart sounds: Normal heart sounds.   Pulmonary:      Effort: Pulmonary effort is normal. No respiratory distress.      Breath sounds: Normal breath sounds. No wheezing or rales.   Abdominal:      General: Bowel sounds are normal. There is no distension.      Tenderness: There is no abdominal tenderness.   Genitourinary:     Comments: SPT with clear yellow urine in bag  Musculoskeletal:         General: Normal range of motion.      Cervical back: Normal range of motion and neck supple.      Right lower leg: No edema.      Left lower leg: No edema.   Skin:     General: Skin is warm and dry.   Neurological:      Mental Status: She is alert. Mental status is at baseline.   Psychiatric:         Mood and  Affect: Mood normal.         Behavior: Behavior normal.       Significant Labs: All pertinent labs within the past 24 hours have been reviewed.    Significant Imaging: I have reviewed all pertinent imaging results/findings within the past 24 hours.      Assessment/Plan:      * Pelvic ring fracture and Pubic Ramus Fracture  Patient presented after a recent fall with pain.  Her imaging showed right pubic ramus fracture and left pelvic ring fracture.  Ortho consulted and patient non-surgical.  Recommended partial WB on LLE.  -PRN pain meds  -PT consulted - recommends rehab  -DVT prophylaxis with SQH for now      HLD (hyperlipidemia)  -Continue Atorvastatin 10 mg QD       CKD (chronic kidney disease) stage 3, GFR 30-59 ml/min  Cr is 1.9 on admission, which is better than recent measures.  Stable at 1.8 yesterday  - avoid nephrotoxins   - renally dose meds       Type 2 diabetes mellitus  Home Meds:  Metformin 500mg  A1C 5.1 on admission  Glucose well controlled  -Hold Metformin  -Diabetic diet          Down syndrome  Noted      VTE Risk Mitigation (From admission, onward)         Ordered     heparin (porcine) injection 5,000 Units  Every 8 hours         04/13/22 1523     IP VTE LOW RISK PATIENT  Once         04/12/22 2327     Place sequential compression device  Until discontinued         04/12/22 2327                Discharge Planning   COMPA:      Code Status: Full Code   Is the patient medically ready for discharge?:     Reason for patient still in hospital (select all that apply): Patient trending condition, Treatment and Pending disposition  Discharge Plan A: Rehab   Discharge Delays:  (pending medical clearance)              Jermaine Maki MD  Department of Hospital Medicine   [facility] ([facility]

## 2022-04-27 NOTE — SUBJECTIVE & OBJECTIVE
Interval History: Patient is sleeping, but easily awakened by voice.  She is mostly nonverbal.  Looks comfortable in bed without distress.  No adverse events reported overnight.  Stable and awaiting Rehab.    Review of Systems   Unable to perform ROS: Patient nonverbal   Constitutional:  Negative for chills and fever.   Respiratory:  Negative for shortness of breath.    Objective:     Vital Signs (Most Recent):  Temp: 98.5 °F (36.9 °C) (04/27/22 0437)  Pulse: 68 (04/27/22 0437)  Resp: 16 (04/27/22 0437)  BP: (!) 95/51 (04/27/22 0437)  SpO2: 97 % (04/27/22 0437) Vital Signs (24h Range):  Temp:  [97.3 °F (36.3 °C)-98.5 °F (36.9 °C)] 98.5 °F (36.9 °C)  Pulse:  [62-77] 68  Resp:  [16-18] 16  SpO2:  [97 %-99 %] 97 %  BP: ()/(51-69) 95/51     Weight: 44 kg (97 lb)  Body mass index is 18.94 kg/m².    Intake/Output Summary (Last 24 hours) at 4/27/2022 0800  Last data filed at 4/27/2022 0600  Gross per 24 hour   Intake 1080 ml   Output 400 ml   Net 680 ml        Physical Exam  Constitutional:       General: She is not in acute distress.     Appearance: She is normal weight. She is ill-appearing (chronically).   HENT:      Head: Normocephalic and atraumatic.      Right Ear: External ear normal.      Left Ear: External ear normal.      Nose: Nose normal.      Mouth/Throat:      Mouth: Mucous membranes are moist.      Pharynx: No oropharyngeal exudate.   Eyes:      General: No scleral icterus.     Pupils: Pupils are equal, round, and reactive to light.   Cardiovascular:      Rate and Rhythm: Normal rate and regular rhythm.      Pulses: Normal pulses.      Heart sounds: Normal heart sounds.   Pulmonary:      Effort: Pulmonary effort is normal. No respiratory distress.      Breath sounds: Normal breath sounds. No wheezing or rales.   Abdominal:      General: Bowel sounds are normal. There is no distension.      Tenderness: There is no abdominal tenderness.   Genitourinary:     Comments: SPT with clear yellow urine in  bag  Musculoskeletal:         General: Normal range of motion.      Cervical back: Normal range of motion and neck supple.      Right lower leg: No edema.      Left lower leg: No edema.   Skin:     General: Skin is warm and dry.   Neurological:      Mental Status: She is alert. Mental status is at baseline.   Psychiatric:         Mood and Affect: Mood normal.         Behavior: Behavior normal.       Significant Labs: All pertinent labs within the past 24 hours have been reviewed.    Significant Imaging: I have reviewed all pertinent imaging results/findings within the past 24 hours.

## 2022-04-27 NOTE — PT/OT/SLP PROGRESS
Physical Therapy Treatment    Patient Name:  Shannan Medrano   MRN:  [id number]    Recommendations:     Discharge Recommendations:  rehabilitation facility   Discharge Equipment Recommendations: bedside commode (Deng RW and deng wheelchair.)   Barriers to discharge: Decreased caregiver support    Assessment:     Shannan Medrano is a 51 y.o. female admitted with a medical diagnosis of Pelvic ring fracture.  She presents with the following impairments/functional limitations:  weakness, orthopedic precautions, pain, gait instability, impaired balance, impaired cognition, impaired endurance, impaired functional mobilty, impaired self care skills, decreased lower extremity function, decreased ROM, decreased safety awareness.    Supine>sit with maxA  Sit>stand modA x 2 with RW (from bed, BSC, chair)  Bed>Chair with RW and modA x 2, stand/pivot  Chair>BSC with RW and modA x 2, step transfer (maxA for turns 2/2 RW mgt)  BSC>Chair with RW and modA x 2, step transfer (maxA for turns 2/2 RW mgt)  Amb 2 x 6' with RW and modA x 2, difficult to gauge WB status  Pt able to tolerate several transfers and 2 gait bouts; WB status unclear  Rec Rehab    Prior to admission pt was independent with mobility and there is expectation of returning to prior level of function to maintain independence avoiding readmission. Pt is at high risk of unplanned readmission due to fall risk and inability ambulate functional distances d/t pain. The lower level of care cannot provide total interdisciplinary approach needed. Pt is able to tolerate 3 hours of daily therapy. Pt is pleasant and motivated to return to prior level of function.     Rehab Prognosis: Good; patient would benefit from acute skilled PT services to address these deficits and reach maximum level of function.    Recent Surgery: * No surgery found *      Plan:     During this hospitalization, patient to be seen 6 x/week to address the identified rehab impairments via gait training,  "therapeutic activities, therapeutic exercises, neuromuscular re-education and progress toward the following goals:    · Plan of Care Expires:  05/14/22    Subjective     Chief Complaint: "hurt" (pain with mobility)  Patient/Family Comments/goals: pt agreeable to therapy  Pain/Comfort:  · Pain Rating 1: 0/10  · Location - Side 1: Bilateral  · Location - Orientation 1: generalized  · Location 1:  (pubic area)  · Pain Addressed 1: Pre-medicate for activity, Reposition, Distraction, Cessation of Activity  · Pain Rating Post-Intervention 1: other (see comments) (expressions of pain with bed mobility and weight bearing, including facial grimaces, sounds, and verbalizing "hurt")      Objective:     Communicated with nurse Todd prior to session.  Patient found HOB elevated with bed alarm (supra-pubic catheter) upon PT entry to room.     General Precautions: Standard, contact, fall   Orthopedic Precautions:LLE partial weight bearing   Braces:    Respiratory Status: Room air     Functional Mobility:  · Bed Mobility:     · Supine to Sit: maximal assistance  · Transfers:     · Sit to Stand:  moderate assistance and of 2 persons with rolling walker  · Bed to Chair: moderate assistance and of 2 persons with  rolling walker  using  Stand Pivot  · Toilet Transfer: moderate assistance and of 2 persons with  rolling walker  using  Step Transfer  · Gait: 2 x 6' with RW and modA x 2, difficult to gauge WB status      AM-PAC 6 CLICK MOBILITY  Turning over in bed (including adjusting bedclothes, sheets and blankets)?: 3  Sitting down on and standing up from a chair with arms (e.g., wheelchair, bedside commode, etc.): 2  Moving from lying on back to sitting on the side of the bed?: 2  Moving to and from a bed to a chair (including a wheelchair)?: 2  Need to walk in hospital room?: 2  Climbing 3-5 steps with a railing?: 1  Basic Mobility Total Score: 12       Therapeutic Activities and Exercises:   Gait and transfer training as " noted    Patient left up in chair with all lines intact, call button in reach, chair alarm on and nurse Peyton notified..    GOALS:   Multidisciplinary Problems     Physical Therapy Goals        Problem: Physical Therapy    Goal Priority Disciplines Outcome Goal Variances Interventions   Physical Therapy Goal     PT, PT/OT Ongoing, Progressing     Description: Goals to be met by: 22    Patient will increase functional independence with mobility by performin. Supine<>sit with SBA without use of hospital bed features.   2. Sit<>stand with SBA with RW.  3. Gait x 50 feet with SBA with RW maintaining PWB of LLE.                   Time Tracking:     PT Received On: 22  PT Start Time: 1546     PT Stop Time: 1616  PT Total Time (min): 30 min     Billable Minutes: Gait Training 15 and Neuromuscular Re-education 15   Co-treat with OT due to complex nature of patient, to insure patient safety, and to prevent injury to patient and caregivers, requiring intervention of two skilled therapists.      Treatment Type: Treatment  PT/PTA: PTA     PTA Visit Number: 2     2022

## 2022-04-28 VITALS
DIASTOLIC BLOOD PRESSURE: 61 MMHG | WEIGHT: 97 LBS | TEMPERATURE: 98 F | HEIGHT: 60 IN | HEART RATE: 70 BPM | OXYGEN SATURATION: 100 % | BODY MASS INDEX: 19.04 KG/M2 | SYSTOLIC BLOOD PRESSURE: 117 MMHG | RESPIRATION RATE: 14 BRPM

## 2022-04-28 PROBLEM — I42.9 CARDIOMYOPATHY: Status: ACTIVE | Noted: 2022-04-28

## 2022-04-28 PROBLEM — Z86.718 HISTORY OF DVT (DEEP VEIN THROMBOSIS): Status: ACTIVE | Noted: 2022-04-28

## 2022-04-28 PROCEDURE — 99225 PR SUBSEQUENT OBSERVATION CARE,LEVEL II: CPT | Mod: ,,, | Performed by: INTERNAL MEDICINE

## 2022-04-28 PROCEDURE — 25000003 PHARM REV CODE 250: Performed by: INTERNAL MEDICINE

## 2022-04-28 PROCEDURE — 63600175 PHARM REV CODE 636 W HCPCS: Performed by: INTERNAL MEDICINE

## 2022-04-28 PROCEDURE — 96372 THER/PROPH/DIAG INJ SC/IM: CPT | Performed by: INTERNAL MEDICINE

## 2022-04-28 PROCEDURE — 99225 PR SUBSEQUENT OBSERVATION CARE,LEVEL II: ICD-10-PCS | Mod: ,,, | Performed by: INTERNAL MEDICINE

## 2022-04-28 PROCEDURE — G0378 HOSPITAL OBSERVATION PER HR: HCPCS

## 2022-04-28 RX ORDER — TRAMADOL HYDROCHLORIDE 50 MG/1
50 TABLET ORAL EVERY 8 HOURS PRN
Qty: 21 TABLET | Refills: 0 | Status: SHIPPED | OUTPATIENT
Start: 2022-04-28 | End: 2022-04-28 | Stop reason: SDUPTHER

## 2022-04-28 RX ORDER — DICYCLOMINE HYDROCHLORIDE 10 MG/1
10 CAPSULE ORAL
Qty: 30 CAPSULE | Refills: 0 | Status: SHIPPED | OUTPATIENT
Start: 2022-04-28 | End: 2022-04-28 | Stop reason: SDUPTHER

## 2022-04-28 RX ORDER — TRAMADOL HYDROCHLORIDE 50 MG/1
50 TABLET ORAL EVERY 8 HOURS PRN
Qty: 21 TABLET | Refills: 0 | Status: SHIPPED | OUTPATIENT
Start: 2022-04-28 | End: 2022-05-05

## 2022-04-28 RX ORDER — DICYCLOMINE HYDROCHLORIDE 10 MG/1
10 CAPSULE ORAL
Qty: 30 CAPSULE | Refills: 0
Start: 2022-04-28 | End: 2022-04-28 | Stop reason: SDUPTHER

## 2022-04-28 RX ORDER — DICYCLOMINE HYDROCHLORIDE 10 MG/1
10 CAPSULE ORAL
Qty: 30 CAPSULE | Refills: 0 | Status: SHIPPED | OUTPATIENT
Start: 2022-04-28

## 2022-04-28 RX ADMIN — Medication 1 TABLET: at 08:04

## 2022-04-28 RX ADMIN — ATORVASTATIN CALCIUM 10 MG: 10 TABLET, FILM COATED ORAL at 08:04

## 2022-04-28 RX ADMIN — LEVOTHYROXINE SODIUM 50 MCG: 50 TABLET ORAL at 05:04

## 2022-04-28 RX ADMIN — OXYBUTYNIN CHLORIDE 10 MG: 5 TABLET, EXTENDED RELEASE ORAL at 08:04

## 2022-04-28 RX ADMIN — HEPARIN SODIUM 5000 UNITS: 5000 INJECTION INTRAVENOUS; SUBCUTANEOUS at 05:04

## 2022-04-28 RX ADMIN — Medication 1 TABLET: at 05:04

## 2022-04-28 RX ADMIN — HEPARIN SODIUM 5000 UNITS: 5000 INJECTION INTRAVENOUS; SUBCUTANEOUS at 02:04

## 2022-04-28 RX ADMIN — OXYCODONE HYDROCHLORIDE AND ACETAMINOPHEN 500 MG: 500 TABLET ORAL at 08:04

## 2022-04-28 RX ADMIN — THERA TABS 1 TABLET: TAB at 08:04

## 2022-04-28 RX ADMIN — FOLIC ACID 1 MG: 1 TABLET ORAL at 08:04

## 2022-04-28 NOTE — PLAN OF CARE
Wheel chair van transport ordered for 1330.     Orders and notes sent to tono jordan at [facility], [phone], please call report to her.    Meds sent to pt's outside pharmacy

## 2022-04-28 NOTE — ASSESSMENT & PLAN NOTE
Cr is 1.9 on admission, which is better than recent measures.  Stable at 1.8.  Has SPT with clear urine in bag  - avoid nephrotoxins   - renally dose meds

## 2022-04-28 NOTE — ASSESSMENT & PLAN NOTE
Home Meds:  Metformin 500mg  A1C 5.1 on admission  Glucose well controlled  -Resume Metformin on discharge  -Diabetic diet

## 2022-04-28 NOTE — DISCHARGE INSTRUCTIONS
Please follow up with your Primary Care Physician and Cardiologist soon - please discuss the need if ongoing anticoagulation with Eliquis (Apixaban) still needed.  A referral for Physical Therapy has been placed - continue with partial weight bearing to left leg.

## 2022-04-28 NOTE — PLAN OF CARE
NURSING HOME ORDERS    04/28/2022  [facility] ([facility])  [facility] ([facility])  [address]  [address]RD FLO[address]  [place] [address]  Dept: [phone]  Loc: [phone]     Admit to Nursing Home:  [facility]    Diagnoses:  Active Hospital Problems    Diagnosis  POA    *Pelvic ring fracture and Pubic Ramus Fracture [S32.810A]  Yes     Priority: 1 - High    Cardiomyopathy [I42.9]  Yes    History of DVT (deep vein thrombosis) [Z86.718]  Not Applicable    Type 2 diabetes mellitus [E11.9]  Yes    CKD (chronic kidney disease) stage 3, GFR 30-59 ml/min [N18.30]  Yes    HLD (hyperlipidemia) [E78.5]  Yes    Down syndrome [Q90.9]  Not Applicable      Resolved Hospital Problems    Diagnosis Date Resolved POA    Pubic ramus fracture, right, closed, initial encounter [S32.591A] 04/13/2022 Yes       Code Status: Full    Patient is homebound due to:  Pelvic ring fracture    Allergies:  Review of patient's allergies indicates:   Allergen Reactions    Caffeine        Vitals:  Routine    Diet: regular diet and diabetic diet: 2000 calorie    Activities:   Weight bearing status: partial weight bearing: left leg    Labs:  As needed    Nursing Precautions:  Fall and Pressure ulcer prevention    Consults:   PT to evaluate and treat- 3 times a week and OT to evaluate and treat- 3 times a week     Miscellaneous Care: Routine Skin for Bedridden Patients:  Apply moisture barrier cream to all                   Diabetes Care:  SN to perform and educate Diabetic management with blood glucose monitoring:, Fingerstick blood sugar AC and HS and Report CBG < 60 or > 350 to physician.      Medications: Discontinue all previous medication orders, if any. See new list below.     Medication List      CHANGE how you take these medications    dicyclomine 10 MG capsule  Commonly known as: BENTYL  Take 1 capsule (10 mg total) by mouth before meals and at bedtime as needed (abdominal pain or cramps).  What  changed:   · when to take this  · reasons to take this        CONTINUE taking these medications    alendronate 5 MG Tab  Commonly known as: FOSAMAX  Take by mouth before breakfast.     apixaban 5 mg Tab  Commonly known as: ELIQUIS  Take 5 mg by mouth 2 (two) times daily.     ascorbic acid (vitamin C) 500 MG tablet  Commonly known as: VITAMIN C  Take 500 mg by mouth once daily.     atorvastatin 10 MG tablet  Commonly known as: LIPITOR  Take 10 mg by mouth once daily.     azelastine 137 mcg (0.1 %) nasal spray  Commonly known as: ASTELIN  1 spray by Nasal route 2 (two) times daily.     calcium-vitamin D3 500 mg-5 mcg (200 unit) per tablet  Commonly known as: OS- + D3  Take 1 tablet by mouth 2 (two) times daily with meals.     EUCERIN Crea  Generic drug: white petrolatum-mineral oiL  Apply topically as needed.     folic acid 400 MCG tablet  Commonly known as: FOLVITE  Take 400 mcg by mouth once daily.     guaiFENesin 100 mg/5 ml 100 mg/5 mL syrup  Commonly known as: ROBITUSSIN  Take 200 mg by mouth 3 (three) times daily as needed for Cough.     hydrocortisone 2.5 % cream  Apply topically 2 (two) times daily.     levothyroxine 50 MCG tablet  Commonly known as: SYNTHROID  Take 50 mcg by mouth before breakfast.     medroxyPROGESTERone 150 mg/mL injection  Commonly known as: DEPO-PROVERA  Inject 1 mL (150 mg total) into the muscle every 3 (three) months.     metFORMIN 500 MG (MOD) 24hr tablet  Commonly known as: GLUMETZA  Take 500 mg by mouth daily with breakfast.     multivitamin per tablet  Commonly known as: THERAGRAN  Take 1 tablet by mouth once daily.     oxybutynin 10 MG 24 hr tablet  Commonly known as: DITROPAN-XL  Take 10 mg by mouth once daily.     simethicone 80 MG chewable tablet  Commonly known as: MYLICON  Take 80 mg by mouth every 6 (six) hours as needed for Flatulence.        STOP taking these medications    ibuprofen 800 MG tablet  Commonly known as: ADVIL,MOTRIN     lactulose 10 gram/15 mL  solution  Commonly known as: CHRONULAC     potassium chloride SA 20 MEQ tablet  Commonly known as: K-DUR,KLOR-CON              Immunizations Administered as of 4/28/2022     No immunizations on file.          _________________________________  Jermaine Maki MD  04/28/2022

## 2022-04-28 NOTE — PLAN OF CARE
No significant events overnight. Pt alert and VSS. Pt repositioning in bed independently. Good appetite and tolerating diet well. Suprapubic cath in place, no drainage or leakage noted to site. Cath down to gravity, cloudy urine noted. Prn Tramadol given for c/o pain with moderate relief. Remains free from falls, injury, and skin breakdown. All needs and concerns met, purposeful rounding done, safety maintained. Call light in reach. Will continue to monitor.

## 2022-04-28 NOTE — ASSESSMENT & PLAN NOTE
Patient presented after a recent fall with pain.  Her imaging showed right pubic ramus fracture and left pelvic ring fracture.  Ortho consulted and patient non-surgical.  Recommended partial WB on LLE.  -PRN pain meds  -PT consulted - recommends rehab, but unable to place.  Will discharge with home PT/OT  -DVT prophylaxis with SQH for now - change back to Apixaban on discharge

## 2022-04-28 NOTE — DISCHARGE SUMMARY
[facility] Medicine  Discharge Summary      Patient Name: Shannan Medrano  MRN: [id number]  Patient Class: OP- Observation  Admission Date: 4/12/2022  Hospital Length of Stay: 0 days  Discharge Date and Time:  04/28/2022 9:18 AM  Attending Physician: Jermaine Maki MD   Discharging Provider: Jermaine Maki MD  Primary Care Provider: Primary Doctor No      HPI:   Ms. Shannan Medrano is a 51 y.o. female, with PMH of Down Syndrome, T2DM, HLD, CKD-3, Cardiomyopathy, urinary retention s/p suprapubic catheter placement, who presented to [facility] ED on 4/12/22 due to left hip & right buttocks pain after falling on Saturday. She further notes chronic lower abdominal pain. Per her caregiver's report, she fell down 4 steps. She states she has balance issues, but can walk, but has been unable to do so since her fall due to pain with bearing weight. She was evaluated in the ED with labs showing normal H&H, and Cr of 1.9. X-ray of the hips showed possible fracture of the right superior ramus. A CT of the pelvis showed a nondisplaced fracture of the right pubic ramus adjacent tot he symphysis. There was also a contralateral left sacral nondisplaced fracture of the pelvic ring. X-ray of the lumbar spine showed wedging of L1 vertebral body which may be remote or normal variant as well as Gr 1 anterolisthesis of L4 on L5. She was placed on OBS.       Hospital Course:   Patient presented after a recent fall with pain.  Her imaging showed right pubic ramus fracture and left pelvic ring fracture.  Ortho consulted and patient non-surgical.  Recommended partial WB on LLE.  Seen by PT and recommended for Rehab.  See below       Goals of Care Treatment Preferences:  Code Status: Full Code      Consults:   Consults (From admission, onward)        Status Ordering Provider     Inpatient consult to Orthopedic Surgery  Once        Provider:  Kane Archer MD    Completed ARJUNDAISY          * Pelvic  ring fracture and Pubic Ramus Fracture  Patient presented after a recent fall with pain.  Her imaging showed right pubic ramus fracture and left pelvic ring fracture.  Ortho consulted and patient non-surgical.  Recommended partial WB on LLE.  -PRN pain meds  -PT consulted - recommends rehab, but unable to place.  Will discharge with home PT/OT  -DVT prophylaxis with SQH for now - change back to Apixaban on discharge      History of DVT (deep vein thrombosis)  On Apixaban - resume on discharge and discuss with PCP to see if still needed      Cardiomyopathy  Keep Follow up with Cardiology      HLD (hyperlipidemia)  -Continue Atorvastatin 10 mg QD       CKD (chronic kidney disease) stage 3, GFR 30-59 ml/min  Cr is 1.9 on admission, which is better than recent measures.  Stable at 1.8.  Has SPT with clear urine in bag  - avoid nephrotoxins   - renally dose meds       Type 2 diabetes mellitus  Home Meds:  Metformin 500mg  A1C 5.1 on admission  Glucose well controlled  -Resume Metformin on discharge  -Diabetic diet          Down syndrome  Noted      Final Active Diagnoses:    Diagnosis Date Noted POA    PRINCIPAL PROBLEM:  Pelvic ring fracture and Pubic Ramus Fracture [S32.810A] 04/12/2022 Yes    Cardiomyopathy [I42.9] 04/28/2022 Yes    History of DVT (deep vein thrombosis) [Z86.718] 04/28/2022 Not Applicable    Type 2 diabetes mellitus [E11.9] 04/12/2022 Yes    CKD (chronic kidney disease) stage 3, GFR 30-59 ml/min [N18.30] 04/12/2022 Yes    HLD (hyperlipidemia) [E78.5] 04/12/2022 Yes    Down syndrome [Q90.9] 08/12/2016 Not Applicable      Problems Resolved During this Admission:    Diagnosis Date Noted Date Resolved POA    Pubic ramus fracture, right, closed, initial encounter [S32.591A] 04/12/2022 04/13/2022 Yes       Discharged Condition: stable    Disposition: Another Health Care Inst*    Follow Up:    Patient Instructions:      WHEELCHAIR FOR HOME USE     Order Specific Question Answer Comments   Hours in  "W/C per day: 6    Type of Wheelchair: Standard    Size(Width): 16"(small adult)    Leg Support: STD footrests    Leg Support: Elevating leg rests    Lap Belt: Velcro    Accessories: Front brakes    Cushion: Specialty Cushion    Cushion: Gel    Height: 5' (1.524 m)    Weight: 44 kg (97 lb)    Does patient have medical equipment at home? wheelchair    Length of need (1-99 months): 99    Please check all that apply: Caregiver is capable and willing to operate wheelchair safely.    Please check all that apply: The patient requires the use of a w/c for activities of daily living within the Home.    Please check all that apply: Patient mobility limitations cannot be sufficiently resolved by the use of other ambulatory therapies.      COMMODE FOR HOME USE     Order Specific Question Answer Comments   Type: Standard    Height: 5' (1.524 m)    Weight: 44 kg (97 lb)    Does patient have medical equipment at home? wheelchair    Length of need (1-99 months): 99        Significant Diagnostic Studies: Labs: BMP: No results for input(s): GLU, NA, K, CL, CO2, BUN, CREATININE, CALCIUM, MG in the last 48 hours. and CBC No results for input(s): WBC, HGB, HCT, PLT in the last 48 hours.    Pending Diagnostic Studies:     None         Medications:  Reconciled Home Medications:      Medication List      CHANGE how you take these medications    dicyclomine 10 MG capsule  Commonly known as: BENTYL  Take 1 capsule (10 mg total) by mouth before meals and at bedtime as needed (abdominal pain or cramps).  What changed:   · when to take this  · reasons to take this        CONTINUE taking these medications    alendronate 5 MG Tab  Commonly known as: FOSAMAX  Take by mouth before breakfast.     apixaban 5 mg Tab  Commonly known as: ELIQUIS  Take 5 mg by mouth 2 (two) times daily.     ascorbic acid (vitamin C) 500 MG tablet  Commonly known as: VITAMIN C  Take 500 mg by mouth once daily.     atorvastatin 10 MG tablet  Commonly known as: " LIPITOR  Take 10 mg by mouth once daily.     azelastine 137 mcg (0.1 %) nasal spray  Commonly known as: ASTELIN  1 spray by Nasal route 2 (two) times daily.     calcium-vitamin D3 500 mg-5 mcg (200 unit) per tablet  Commonly known as: OS- + D3  Take 1 tablet by mouth 2 (two) times daily with meals.     EUCERIN Crea  Generic drug: white petrolatum-mineral oiL  Apply topically as needed.     folic acid 400 MCG tablet  Commonly known as: FOLVITE  Take 400 mcg by mouth once daily.     guaiFENesin 100 mg/5 ml 100 mg/5 mL syrup  Commonly known as: ROBITUSSIN  Take 200 mg by mouth 3 (three) times daily as needed for Cough.     hydrocortisone 2.5 % cream  Apply topically 2 (two) times daily.     levothyroxine 50 MCG tablet  Commonly known as: SYNTHROID  Take 50 mcg by mouth before breakfast.     medroxyPROGESTERone 150 mg/mL injection  Commonly known as: DEPO-PROVERA  Inject 1 mL (150 mg total) into the muscle every 3 (three) months.     metFORMIN 500 MG (MOD) 24hr tablet  Commonly known as: GLUMETZA  Take 500 mg by mouth daily with breakfast.     multivitamin per tablet  Commonly known as: THERAGRAN  Take 1 tablet by mouth once daily.     oxybutynin 10 MG 24 hr tablet  Commonly known as: DITROPAN-XL  Take 10 mg by mouth once daily.     simethicone 80 MG chewable tablet  Commonly known as: MYLICON  Take 80 mg by mouth every 6 (six) hours as needed for Flatulence.        STOP taking these medications    ibuprofen 800 MG tablet  Commonly known as: ADVIL,MOTRIN     lactulose 10 gram/15 mL solution  Commonly known as: CHRONULAC     potassium chloride SA 20 MEQ tablet  Commonly known as: K-DUR,KLOR-CON            Indwelling Lines/Drains at time of discharge:   Lines/Drains/Airways     Drain  Duration                Suprapubic Catheter 02/15/22 1448 latex 12 Fr. 71 days                Time spent on the discharge of patient: 35 minutes         Jermaine Maki MD  Department of Hospital Medicine  [facility]

## 2022-04-28 NOTE — PLAN OF CARE
Pt is ready to return to her [facility] facility today.    Recommended DME ordered and will be delivered this morning.    CM to notify [facility] to inquire about transportation, if unable to provide, CM to arrange transport per WC van thru [facility].    Pt's [facility] nurse is aware that pt will be discharged today.    CM to follow for final arrangements.    [facility]  Discharge Final Note    Primary Care Provider: Primary Doctor No    Expected Discharge Date: 4/28/2022    Final Discharge Note (most recent)       Final Note - 04/28/22 0737          Final Note    Assessment Type Final Discharge Note (P)      Anticipated Discharge Disposition Home or Self Care (P)      What phone number can be called within the next 1-3 days to see how you are doing after discharge? [phone] (P)      Hospital Resources/Appts/Education Provided Provided patient/caregiver with written discharge plan information (P)                      Important Message from Medicare

## 2022-04-29 NOTE — PROGRESS NOTES
Pt alert and VSS. Discharge paper work and report already given to nursing facility per day nurse. Pt dressed and transported via wheel chair with transportation along with personal belongings. IV no longer present. Suprapubic cath in place and site clean, dry, and intact. Pt shows no signs of pain or discomfort at present. All needs met.

## 2022-08-03 NOTE — ASSESSMENT & PLAN NOTE
[facility] INVASIVE CARDIOLOGY- (Lola, Chacorta, Darren, Amalia, Suman, Minnie, Josafat, Jax, Herberth)   CARDIAC CATH LAB, ACP TEAM   [phone]      CHIEF COMPLAINT: Patient is a 55y old  Male who presents with a chief complaint of     HPI:  56 yo M with no significant PMhx PRESENTED TO [facility] on 8/1  see H&P below from GC :      55M with hx of PVCs, tinnitus and anxiety, who presents for shortness of breath x 5 days, worse with exertion, gradual onset. However, today, was associated with new onset chest pain, described as "tight", 8/10, substernal, non-radiating, + diaphoretic, but WITHOUT headache, N/V, arm pain, jaw pain. Patient states he did heavy weightlifting 3 days ago. Also, travelled to [facility] 3 weeks ago (was on a plane ride). Does have chronic leg swelling x 4 months - had "extensive" testing by vascular surgery (Dr. Tidwell) - all testing negative, including arterial dopplers, vein mapping and ultrasound. No calf pain.   (end of copied text)  He was found to be in Afib and was given cardizem and started on metoprolol. He was started on eliquis . He was given lasix which relieved his chest tightness AND ENTRESTO was also started at [facility].     Interval: Patient seen and examined at bedside.  Patient does not endorse any new complaints.  Access site remains stable.  Patient denies any chest pain, shortness of breath, palpitations, nausea, vomiting or headache.  No acute events overnight    Review of Systems all WNL except below indicated:    Constitutional: [ ] Fever [ ] Chills [ ] Fatigue [ ] Weight change   HEENT: [ ] Blurred vision [ ] Eye Pain [ ] Headache [ ] Runny nose [ ] Sore Throat   Respiratory: [ ] Cough [ ] Wheezing [ ] Shortness of breath  Cardiovascular: [ ] Chest Pain [ ] Palpitations [ ] MAURO [ ] PND [ ] Orthopnea  Gastrointestinal: [ ] Abdominal Pain [ ] Diarrhea [ ] Constipation [ ] Hemorrhoids [ ] Nausea [ ] Vomiting  Genitourinary: [ ] Nocturia [ ] Dysuria [ ] Incontinence  Extremities: [ ] Swelling [ ] Joint Pain  Neurologic: [ ] Focal deficit [ ] Paresthesias [ ] Syncope  Lymphatic: [ ] Swelling [ ] Lymphadenopathy   Skin: [ ] Rash [ ] Ecchymoses [ ] Wounds [ ] Lesions  Psychiatry: [ ] Depression [ ] Suicidal/Homicidal Ideation [ ] Anxiety [ ] Sleep Disturbances      PAST MEDICAL & SURGICAL HISTORY:  Tinnitus  At risk for sleep apnea  Class 2 obesity with body mass index (BMI) of 38.0 to 38.9 in adult  GERD (gastroesophageal reflux disease)  Ventral hernia  Cardiac arrhythmia  Frequent PVCs  History of nasal surgery    MEDICATIONS  (STANDING):  ALPRAZolam 0.25 milliGRAM(s) Oral daily  metoprolol succinate  milliGRAM(s) Oral daily    MEDICATIONS  (PRN):      Allergies    No Known Allergies    Intolerances    Vital Signs Last 24 Hrs  T(C): 36.4 (03 Aug 2022 04:54), Max: 36.7 (02 Aug 2022 10:17)  T(F): 97.5 (03 Aug 2022 04:54), Max: 98.1 (02 Aug 2022 10:17)  HR: 110 (03 Aug 2022 04:54) (79 - 117)  BP: 114/83 (03 Aug 2022 04:54) (100/69 - 135/84)  BP(mean): 91 (03 Aug 2022 04:54) (91 - 119)  RR: 18 (03 Aug 2022 04:54) (16 - 19)  SpO2: 100% (03 Aug 2022 04:54) (95% - 100%)    Parameters below as of 03 Aug 2022 04:54  Patient On (Oxygen Delivery Method): room air    I&O's Summary    Weight (kg): 149.7 (08-02 @ 11:30)    FOCUSED PHYSICAL EXAM:  Pulmonary: Non-labored, breath sounds are clear bilaterally, No wheezing, rales or rhonchi  Cardiovascular: Regular, S1 and S2, No murmurs, rubs, gallops or clicks  cath site: radial stable w/o bleeding or hematoma, soft, + pulses     LABS: All Labs Reviewed:                        16.5   9.87  )-----------( 226      ( 03 Aug 2022 01:13 )             48.6                         17.6   11.54 )-----------( 246      ( 02 Aug 2022 06:25 )             52.6                         17.3   9.72  )-----------( 227      ( 01 Aug 2022 08:30 )             49.9     03 Aug 2022 01:13    141    |  101    |  17     ----------------------------<  106    3.8     |  28     |  1.22   02 Aug 2022 06:25    140    |  100    |  17     ----------------------------<  121    4.0     |  34     |  1.39   01 Aug 2022 08:30    138    |  101    |  18     ----------------------------<  132    4.0     |  29     |  1.32     Ca    9.0        03 Aug 2022 01:13  Ca    9.2        02 Aug 2022 06:25  Ca    9.0        01 Aug 2022 08:30  Phos  3.0       02 Aug 2022 06:25  Mg     2.3       02 Aug 2022 06:25  Mg     1.9       01 Aug 2022 08:30    TPro  7.2    /  Alb  3.9    /  TBili  0.9    /  DBili  x      /  AST  29     /  ALT  68     /  AlkPhos  69     01 Aug 2022 08:30    RESULTS:  TELE interpretation   ECG:  ECHO:Summary:   1. Technically difficult study with poor endocardial visualization.   2. Assessment of left ventricle regional wall motion and left ventricle   ejection fraction is limited due to poor endocardial visualization.   Consider use of IV echo contrast to better evaluate endocardium, if   clinically indicated. The left ventricle systolic function appears at   least moderately reduced, estimated LVEF    40%.   3. Normal left ventricular internal cavity size.   4. Increased LV wall thickness.   5. Possible moderator band visualized in the right ventricle.   6. Mild mitral annular calcification.   7. There appears to be some degree of mitral regurgitation, further   assessment limited by poor visualization of mitral valve leaflets.   8. The aortic valve leaflets are not well visualized, appears to have   calcification. Suboptimal Doppler assessments of aortic valve velocities   limits further evaluation.   9. The pericardium was not well visualized.    STRESS TEST:  CT CORONARIES:  CATH REPORT: Three vessel non-obstructive coronary artery disease   --Ectasia of the right coronary artery   Separate ostium of the left anterior descending artery and left  circumflex artery  Right dominant system   Elevated right heart pressure (mRA 18mmHg with a V wave of 21mmHg)   Moderate-severe post-capillary pulmonary hypertension (sPAP 54mmHg,  dPAP 38mmHg, mPAP 44mmHg)  PCWP = 28mmHg with a V wave of 30mmHg   LVEDP = 24mmHg   No significant gradient across the aortic valve   PAsat = 66.4% // AOsat = 97.4%   Isaias CO // CI = 5.08l/min // 1.83l/min/m2                              Patient presented after a recent fall with pain.  Her imaging showed right pubic ramus fracture and left pelvic ring fracture.  Ortho consulted and patient non-surgical.  Recommended partial WB on LLE.  -PRN pain meds  -PT consulted - recommends rehab  -DVT prophylaxis with SQH for now

## 2022-08-17 ENCOUNTER — HOSPITAL ENCOUNTER (EMERGENCY)
Facility: OTHER | Age: 52
Discharge: HOME | End: 2022-08-17
Attending: EMERGENCY MEDICINE
Payer: MEDICAID

## 2022-08-17 VITALS
HEART RATE: 82 BPM | TEMPERATURE: 98 F | WEIGHT: 95 LBS | OXYGEN SATURATION: 97 % | BODY MASS INDEX: 21.37 KG/M2 | RESPIRATION RATE: 17 BRPM | DIASTOLIC BLOOD PRESSURE: 82 MMHG | SYSTOLIC BLOOD PRESSURE: 124 MMHG | HEIGHT: 56 IN

## 2022-08-17 DIAGNOSIS — S05.01XA ABRASION OF RIGHT CORNEA, INITIAL ENCOUNTER: Primary | ICD-10-CM

## 2022-08-17 PROCEDURE — 99283 EMERGENCY DEPT VISIT LOW MDM: CPT

## 2022-08-17 PROCEDURE — 25000003 PHARM REV CODE 250: Performed by: NURSE PRACTITIONER

## 2022-08-17 RX ORDER — ERYTHROMYCIN 5 MG/G
OINTMENT OPHTHALMIC
Qty: 3.5 G | Refills: 0 | Status: SHIPPED | OUTPATIENT
Start: 2022-08-17

## 2022-08-17 RX ORDER — ERYTHROMYCIN 5 MG/G
OINTMENT OPHTHALMIC
Status: COMPLETED | OUTPATIENT
Start: 2022-08-17 | End: 2022-08-17

## 2022-08-17 RX ORDER — PROPARACAINE HYDROCHLORIDE 5 MG/ML
1 SOLUTION/ DROPS OPHTHALMIC
Status: COMPLETED | OUTPATIENT
Start: 2022-08-17 | End: 2022-08-17

## 2022-08-17 RX ADMIN — ERYTHROMYCIN 1 INCH: 5 OINTMENT OPHTHALMIC at 01:08

## 2022-08-17 RX ADMIN — FLUORESCEIN SODIUM 1 EACH: 1 STRIP OPHTHALMIC at 01:08

## 2022-08-17 RX ADMIN — PROPARACAINE HYDROCHLORIDE 1 DROP: 5 SOLUTION/ DROPS OPHTHALMIC at 01:08

## 2022-08-17 NOTE — DISCHARGE INSTRUCTIONS
Call and schedule a follow up with her eye doctor for recheck in next 48 hours.    If she develops worsening pain, drainage or any other concerns please return tot he ED for reevaluation.

## 2022-08-17 NOTE — ED TRIAGE NOTES
Chief Complaint   Patient presents with    Eye Problem     Pt care taker states right eye red and draining onset yesterday worse this AM.  Pt hx of down syndrome.  Caretaker states pt is at normal mental status.  Pt has redness and matter to right eye.  Mild redness noted to left eye     52 year old female with care taker here today with complaints of right eye redness. Reports patient took a bath last night and reports pt accidentally got soap in her eyes. Reports no improvement. There is some redness to left eye.

## 2022-08-17 NOTE — ED PROVIDER NOTES
"Source of History:  Caretaker    Chief complaint:  Eye Problem (Pt care taker states right eye red and draining onset yesterday worse this AM.  Pt hx of down syndrome.  Caretaker states pt is at normal mental status.  Pt has redness and matter to right eye.  Mild redness noted to left eye)      HPI:  Shannan Medrano is a 52 y.o. female presenting with c/o right eye pain, redness and tearing.  Caretaker states she took a shower yesterday and reports she got soap in her eye and it was burning.  Reports has been complaining of pain since last night.  No purulent drainage reports.  Patient has downs.      This is the extent to the patients complaints today here in the emergency department.    PMH:  As per HPI and below:  Past Medical History:   Diagnosis Date    Down syndrome     Osteopenia      Past Surgical History:   Procedure Laterality Date    HERNIA REPAIR         Social History     Tobacco Use    Smoking status: Never Smoker    Smokeless tobacco: Never Used   Substance Use Topics    Alcohol use: No    Drug use: No       ROS: As per HPI and below:  Constitutional: No fever.  No chills.  Eyes: eye redness, tearing, pain  ENT: No sore throat. No ear pain.  MSK: No back pain. No joint pain.   Integument: No rashes or lesions.    Review of patient's allergies indicates:   Allergen Reactions    Caffeine        Physical Exam:    /82 (BP Location: Left arm, Patient Position: Sitting)   Pulse 82   Temp 98.3 °F (36.8 °C) (Oral)   Resp 17   Ht 4' 8" (1.422 m)   Wt 43.1 kg (95 lb)   SpO2 97%   BMI 21.30 kg/m²   Vitals:    08/17/22 1156 08/17/22 1219 08/17/22 1309   BP: (!) 107/56 99/67 124/82   Pulse: 77  82   Resp: 18  17   Temp: 98.2 °F (36.8 °C)  98.3 °F (36.8 °C)   TempSrc: Oral  Oral   SpO2: 98%  97%   Weight: 43.1 kg (95 lb)     Height: 4' 8" (1.422 m)       Nursing note and vital signs reviewed.  Constitutional: No acute distress.  Nontoxic  Eyes: Physical Exam  Eyes:      General: Lids are normal. " Lids are everted, no foreign bodies appreciated.         Right eye: No foreign body or discharge.      Extraocular Movements: Extraocular movements intact.      Conjunctiva/sclera:      Right eye: Right conjunctiva is injected. No chemosis or exudate.     Pupils: Pupils are equal, round, and reactive to light.      Right eye: Corneal abrasion and fluorescein uptake present.      Funduscopic exam:     Right eye: Red reflex present.        Comments: Central corneal abrasion with smaller abrasion laterally         ENT: Normal phonation.  Musculoskeletal: Good range of motion all joints.  No deformities.  Neck supple.  No meningismus. Neurovascularly intact.  Skin: No rashes seen.  Good turgor.  No abrasions.  No ecchymoses.  Psych:  Appropriate, conversant.    No orders to display       MDM/ Differential Dx:   Differential Diagnosis includes, but is not limited to:  Corneal abrasion, retained foreign body, periorbital or orbital cellulitis, keratitis, iritis, subconjunctival hemorrhage, conjunctivitis, hordeolum/chalazion, glaucoma, retinal detachment    52 y.o. female with right eye pain, redness and tearing after getting soap into her eye yesterday.  On exam there was a central area of dye uptake.  Likely corneal abrasion from rubbing her eye after soap got in it during her shower.  Treated with erythromycin in the ED and dc'd with a RX.  Discussed with caretaker to f/u with her eye doctor.  She was agreeable with this plan.                   Diagnostic Impression:    1. Abrasion of right cornea, initial encounter         ED Disposition Condition    Discharge Stable          ED Prescriptions     Medication Sig Dispense Start Date End Date Auth. Provider    erythromycin (ROMYCIN) ophthalmic ointment Place a 1/2 inch ribbon of ointment into the lower eyelid. 3.5 g 8/17/2022  ANGIE Egan        Follow-up Information     Follow up With Specialties Details Why Contact Info    Sarita Myers MD  Cardiology Schedule an appointment as soon as possible for a visit in 3 days  [address]  [facility] [id number]  [phone]      [facility] - Emergency Dept Emergency Medicine Go to  If symptoms worsen [address]  [facility] [id number] [phone]           Debbie Henry, ANGIE  08/18/22 1257

## 2022-08-17 NOTE — ED NOTES
Patient sitting up in wheelchair with caretaker at bedside. Patient is awake/alert. No acute distress. Awaiting provider.

## 2022-08-19 ENCOUNTER — PATIENT OUTREACH (OUTPATIENT)
Dept: EMERGENCY MEDICINE | Facility: OTHER | Age: 52
End: 2022-08-19
Payer: MEDICAID

## 2022-08-19 NOTE — PROGRESS NOTES
Unable to enroll in navigation services. Pt. Has a caretakers and you cannot speak to patient for navigation services. Closing encounter.

## 2022-11-04 ENCOUNTER — HOSPITAL ENCOUNTER (EMERGENCY)
Facility: OTHER | Age: 52
Discharge: HOME | End: 2022-11-04
Attending: EMERGENCY MEDICINE
Payer: MEDICAID

## 2022-11-04 VITALS
HEART RATE: 58 BPM | BODY MASS INDEX: 21.75 KG/M2 | RESPIRATION RATE: 18 BRPM | TEMPERATURE: 98 F | SYSTOLIC BLOOD PRESSURE: 112 MMHG | WEIGHT: 97 LBS | DIASTOLIC BLOOD PRESSURE: 67 MMHG | OXYGEN SATURATION: 100 %

## 2022-11-04 DIAGNOSIS — T83.010A SUPRAPUBIC CATHETER DYSFUNCTION, INITIAL ENCOUNTER: Primary | ICD-10-CM

## 2022-11-04 LAB
BACTERIA #/AREA URNS HPF: ABNORMAL /HPF
BILIRUB UR QL STRIP: NEGATIVE
CLARITY UR: ABNORMAL
COLOR UR: YELLOW
GLUCOSE UR QL STRIP: NEGATIVE
HGB UR QL STRIP: ABNORMAL
HYALINE CASTS #/AREA URNS LPF: 0 /LPF
KETONES UR QL STRIP: NEGATIVE
LEUKOCYTE ESTERASE UR QL STRIP: ABNORMAL
MICROSCOPIC COMMENT: ABNORMAL
NITRITE UR QL STRIP: POSITIVE
PH UR STRIP: 8 [PH] (ref 5–8)
PROT UR QL STRIP: ABNORMAL
RBC #/AREA URNS HPF: 5 /HPF (ref 0–4)
SP GR UR STRIP: 1.01 (ref 1–1.03)
URN SPEC COLLECT METH UR: ABNORMAL
UROBILINOGEN UR STRIP-ACNC: NEGATIVE EU/DL
WBC #/AREA URNS HPF: 20 /HPF (ref 0–5)

## 2022-11-04 PROCEDURE — 87077 CULTURE AEROBIC IDENTIFY: CPT | Mod: 59

## 2022-11-04 PROCEDURE — 51798 US URINE CAPACITY MEASURE: CPT

## 2022-11-04 PROCEDURE — 99283 EMERGENCY DEPT VISIT LOW MDM: CPT | Mod: 25

## 2022-11-04 PROCEDURE — 87086 URINE CULTURE/COLONY COUNT: CPT

## 2022-11-04 PROCEDURE — 81000 URINALYSIS NONAUTO W/SCOPE: CPT

## 2022-11-04 PROCEDURE — 87186 SC STD MICRODIL/AGAR DIL: CPT

## 2022-11-04 PROCEDURE — 87088 URINE BACTERIA CULTURE: CPT

## 2022-11-04 RX ORDER — SULFAMETHOXAZOLE AND TRIMETHOPRIM 800; 160 MG/1; MG/1
1 TABLET ORAL 2 TIMES DAILY
Qty: 14 TABLET | Refills: 0 | Status: SHIPPED | OUTPATIENT
Start: 2022-11-04 | End: 2022-11-11

## 2022-11-04 NOTE — FIRST PROVIDER EVALUATION
Medical screening examination initiated.  I have conducted a focused provider triage encounter, findings are as follows:    Brief history of present illness:  lower abdominal pain since today- currently has a tobar and there are concerns that the bag came disconnected from the tobar    Vitals:    11/04/22 1840   BP: 112/63   BP Location: Right arm   Patient Position: Sitting   Pulse: 64   Resp: 18   Temp: 99.1 °F (37.3 °C)   TempSrc: Oral   SpO2: 100%   Weight: 44 kg (97 lb)       Pertinent physical exam:  minimal urine noted in tobar bag    Brief workup plan:  PVR and change tobar    Preliminary workup initiated; this workup will be continued and followed by the physician or advanced practice provider that is assigned to the patient when roomed.

## 2022-11-05 NOTE — ED TRIAGE NOTES
Patient to ED for suprapubic catheter not draining appropriately. States  noticed last night it wasn't giving much output, but patient was urinating in toilet.

## 2022-11-05 NOTE — ED PROVIDER NOTES
Encounter Date: 11/4/2022       History     Chief Complaint   Patient presents with    Urinary Retention     Patient reports to ED for tobar cath not draining appropriately . Patient also reports lower abdominal discomfort .      Patient is a 52-year-old female who presents for suprapubic catheter dysfunction; PMH down syndrome, urinary retention, suprapubic catheter in place.  Caretaker states minimal urine output in leg bag since last night.  Patient also endorsed intermittent lower abdominal discomfort.  They also noted urine leaking from her urethra into the toilet.  No abnormal behavior or fever.  Patient cannot contribute HPI due to PMH.  The patients available PMH, PSH, Social History, medications, allergies, and triage vital signs were reviewed just prior to their medical evaluation.      Please be advised this text was dictated with Idylis software and may contain errors due to translation.         Review of patient's allergies indicates:   Allergen Reactions    Caffeine      Past Medical History:   Diagnosis Date    Down syndrome     Osteopenia      Past Surgical History:   Procedure Laterality Date    HERNIA REPAIR       No family history on file.  Social History     Tobacco Use    Smoking status: Never    Smokeless tobacco: Never   Substance Use Topics    Alcohol use: No    Drug use: No     Review of Systems   Unable to perform ROS: Other     Physical Exam     Initial Vitals [11/04/22 1840]   BP Pulse Resp Temp SpO2   112/63 64 18 99.1 °F (37.3 °C) 100 %      MAP       --         Physical Exam    Nursing note and vitals reviewed.  Constitutional: She appears well-developed and well-nourished. She is not diaphoretic. No distress.   Short stature   HENT:   Head: Normocephalic and atraumatic.   Right Ear: External ear normal.   Left Ear: External ear normal.   Eyes: Conjunctivae and EOM are normal.   Cardiovascular:  Normal rate, regular rhythm and intact distal pulses.           Pulmonary/Chest: Breath  sounds normal. No respiratory distress. She has no wheezes. She has no rhonchi. She has no rales.   Abdominal: Abdomen is soft. Bowel sounds are normal. There is no abdominal tenderness.   Suprapubic catheter right pelvic region, stoma clean no evidence of secondary infection There is no rebound and no guarding.   Musculoskeletal:         General: No edema. Normal range of motion.     Neurological: She is alert. She has normal strength.   Skin: Skin is warm and dry. Capillary refill takes less than 2 seconds. No rash noted. No erythema. No pallor.   Psychiatric: She has a normal mood and affect. Her behavior is normal. Judgment and thought content normal.       ED Course   Suprapubic Tube    Date/Time: 11/4/2022 10:01 PM  Performed by: Nadege Orozco PA-C  Authorized by: Jan Pinedo MD   Consent: Verbal consent obtained.  Consent given by: guardian  Patient identity confirmed: arm band  Indications: catheter change  Local anesthesia used: no    Anesthesia:  Local anesthesia used: no    Sedation:  Patient sedated: no    Preparation: Patient was prepped and draped in the usual sterile fashion.  Suprapubic aspiration by: catheter  Catheter type: Ayers  Catheter size: 14 Fr  Number of attempts: 1  Urine characteristics: yellow and mildly cloudy  Patient tolerance: patient tolerated the procedure well with no immediate complications      Labs Reviewed   URINALYSIS, REFLEX TO URINE CULTURE - Abnormal; Notable for the following components:       Result Value    Appearance, UA Cloudy (*)     Protein, UA 1+ (*)     Occult Blood UA 1+ (*)     Nitrite, UA Positive (*)     Leukocytes, UA 3+ (*)     All other components within normal limits    Narrative:     Specimen Source->Urine   URINALYSIS MICROSCOPIC - Abnormal; Notable for the following components:    RBC, UA 5 (*)     WBC, UA 20 (*)     Bacteria Many (*)     All other components within normal limits    Narrative:     Specimen Source->Urine   CULTURE, URINE           Imaging Results    None          Medications - No data to display  Medical Decision Making:   History:   Old Medical Records: I decided to obtain old medical records.  Old Records Summarized: records from clinic visits and records from previous admission(s).  Differential Diagnosis:   Blocked suprapubic cath, UTI  Physical exam and history taking lower clinical suspicion for bladder rupture, anuria   Clinical Tests:   Lab Tests: Ordered  ED Management:  >500mL bladder scan  Flushed catheter with some return of sediment, then no return. Suspect blockage, will exchange. Guardian agreed to plan of care and voiced understanding.              ED Course as of 11/05/22 0906 Fri Nov 04, 2022   2157 Signed out at shift change pending UA. Stable for d/c back to living facility. Caretaker agreed to plan of care and voiced understanding.   [MF]      ED Course User Index  [MF] Nadege Orozco PA-C        Rx bactrim, f/u outpatient urology     Nadege Orozco PA-C  I discussed the following case, diagnosis and plan of care with attending physician.      Clinical Impression:   Final diagnoses:  [T83.010A] Suprapubic catheter dysfunction, initial encounter (Primary)      ED Disposition Condition    Discharge Stable          ED Prescriptions       Medication Sig Dispense Start Date End Date Auth. Provider    sulfamethoxazole-trimethoprim 800-160mg (BACTRIM DS) 800-160 mg Tab Take 1 tablet by mouth 2 (two) times daily. for 7 days 14 tablet 11/4/2022 11/11/2022 Nadege Orozco PA-C          Follow-up Information    None          Nadege Orozco PA-C  11/05/22 0907

## 2022-11-09 LAB
BACTERIA UR CULT: ABNORMAL
BACTERIA UR CULT: ABNORMAL

## 2023-01-03 ENCOUNTER — HOSPITAL ENCOUNTER (EMERGENCY)
Facility: OTHER | Age: 53
Discharge: HOME | End: 2023-01-03
Attending: EMERGENCY MEDICINE
Payer: MEDICAID

## 2023-01-03 VITALS
HEIGHT: 56 IN | BODY MASS INDEX: 21.75 KG/M2 | TEMPERATURE: 98 F | HEART RATE: 51 BPM | SYSTOLIC BLOOD PRESSURE: 96 MMHG | RESPIRATION RATE: 18 BRPM | OXYGEN SATURATION: 100 % | DIASTOLIC BLOOD PRESSURE: 55 MMHG

## 2023-01-03 DIAGNOSIS — H40.211 ACUTE ANGLE-CLOSURE GLAUCOMA OF RIGHT EYE: Primary | ICD-10-CM

## 2023-01-03 LAB
ANION GAP SERPL CALC-SCNC: 13 MMOL/L (ref 8–16)
BASOPHILS # BLD AUTO: 0.08 K/UL (ref 0–0.2)
BASOPHILS NFR BLD: 0.8 % (ref 0–1.9)
BUN SERPL-MCNC: 22 MG/DL (ref 6–20)
CALCIUM SERPL-MCNC: 9.8 MG/DL (ref 8.7–10.5)
CHLORIDE SERPL-SCNC: 111 MMOL/L (ref 95–110)
CO2 SERPL-SCNC: 19 MMOL/L (ref 23–29)
CREAT SERPL-MCNC: 1.7 MG/DL (ref 0.5–1.4)
DIFFERENTIAL METHOD: ABNORMAL
EOSINOPHIL # BLD AUTO: 0.1 K/UL (ref 0–0.5)
EOSINOPHIL NFR BLD: 0.6 % (ref 0–8)
ERYTHROCYTE [DISTWIDTH] IN BLOOD BY AUTOMATED COUNT: 13.1 % (ref 11.5–14.5)
EST. GFR  (NO RACE VARIABLE): 36 ML/MIN/1.73 M^2
GLUCOSE SERPL-MCNC: 85 MG/DL (ref 70–110)
HCT VFR BLD AUTO: 45.3 % (ref 37–48.5)
HGB BLD-MCNC: 15.4 G/DL (ref 12–16)
IMM GRANULOCYTES # BLD AUTO: 0.06 K/UL (ref 0–0.04)
IMM GRANULOCYTES NFR BLD AUTO: 0.6 % (ref 0–0.5)
LYMPHOCYTES # BLD AUTO: 2.7 K/UL (ref 1–4.8)
LYMPHOCYTES NFR BLD: 28.2 % (ref 18–48)
MCH RBC QN AUTO: 32.8 PG (ref 27–31)
MCHC RBC AUTO-ENTMCNC: 34 G/DL (ref 32–36)
MCV RBC AUTO: 97 FL (ref 82–98)
MONOCYTES # BLD AUTO: 0.7 K/UL (ref 0.3–1)
MONOCYTES NFR BLD: 7.3 % (ref 4–15)
NEUTROPHILS # BLD AUTO: 6 K/UL (ref 1.8–7.7)
NEUTROPHILS NFR BLD: 62.5 % (ref 38–73)
NRBC BLD-RTO: 0 /100 WBC
PLATELET # BLD AUTO: 323 K/UL (ref 150–450)
PMV BLD AUTO: 9.6 FL (ref 9.2–12.9)
POTASSIUM SERPL-SCNC: 4 MMOL/L (ref 3.5–5.1)
RBC # BLD AUTO: 4.69 M/UL (ref 4–5.4)
SODIUM SERPL-SCNC: 143 MMOL/L (ref 136–145)
WBC # BLD AUTO: 9.54 K/UL (ref 3.9–12.7)

## 2023-01-03 PROCEDURE — 80048 BASIC METABOLIC PNL TOTAL CA: CPT | Performed by: PHYSICIAN ASSISTANT

## 2023-01-03 PROCEDURE — 99283 EMERGENCY DEPT VISIT LOW MDM: CPT | Mod: 25

## 2023-01-03 PROCEDURE — 25000003 PHARM REV CODE 250: Performed by: EMERGENCY MEDICINE

## 2023-01-03 PROCEDURE — 85025 COMPLETE CBC W/AUTO DIFF WBC: CPT | Performed by: PHYSICIAN ASSISTANT

## 2023-01-03 PROCEDURE — 25000003 PHARM REV CODE 250: Performed by: PHYSICIAN ASSISTANT

## 2023-01-03 RX ORDER — ACETAZOLAMIDE 250 MG/1
500 TABLET ORAL
Status: COMPLETED | OUTPATIENT
Start: 2023-01-03 | End: 2023-01-03

## 2023-01-03 RX ORDER — PROPARACAINE HYDROCHLORIDE 5 MG/ML
1 SOLUTION/ DROPS OPHTHALMIC
Status: COMPLETED | OUTPATIENT
Start: 2023-01-03 | End: 2023-01-03

## 2023-01-03 RX ORDER — ACETAZOLAMIDE 250 MG/1
250 TABLET ORAL 2 TIMES DAILY
Qty: 6 TABLET | Refills: 0 | Status: SHIPPED | OUTPATIENT
Start: 2023-01-03 | End: 2023-01-04 | Stop reason: SDUPTHER

## 2023-01-03 RX ORDER — BRIMONIDINE TARTRATE 1.5 MG/ML
1 SOLUTION/ DROPS OPHTHALMIC
Status: COMPLETED | OUTPATIENT
Start: 2023-01-03 | End: 2023-01-03

## 2023-01-03 RX ORDER — TIMOLOL MALEATE 5 MG/ML
1 SOLUTION/ DROPS OPHTHALMIC
Status: COMPLETED | OUTPATIENT
Start: 2023-01-03 | End: 2023-01-03

## 2023-01-03 RX ADMIN — ACETAZOLAMIDE 500 MG: 250 TABLET ORAL at 05:01

## 2023-01-03 RX ADMIN — PROPARACAINE HYDROCHLORIDE 1 DROP: 5 SOLUTION/ DROPS OPHTHALMIC at 03:01

## 2023-01-03 RX ADMIN — BRIMONIDINE TARTRATE 1 DROP: 1.5 SOLUTION/ DROPS OPHTHALMIC at 05:01

## 2023-01-03 RX ADMIN — TIMOLOL MALEATE 1 DROP: 5 SOLUTION/ DROPS OPHTHALMIC at 05:01

## 2023-01-03 RX ADMIN — FLUORESCEIN SODIUM 1 EACH: 1 STRIP OPHTHALMIC at 03:01

## 2023-01-03 NOTE — FIRST PROVIDER EVALUATION
Emergency Department TeleTriage Encounter Note      CHIEF COMPLAINT    Chief Complaint   Patient presents with    Eye Pain     Patient presented to ER with c/o right eye redness, pain, and swelling x 3 days. Patient sent to ER from urgent care.       VITAL SIGNS   Initial Vitals [01/03/23 1227]   BP Pulse Resp Temp SpO2   (!) 130/90 87 17 97.6 °F (36.4 °C) 99 %      MAP       --            ALLERGIES    Review of patient's allergies indicates:   Allergen Reactions    Caffeine        PROVIDER TRIAGE NOTE  Patient presents with complaint of right eye redness, swelling and pain for three days. Patient is non-verbal. Caregiver denied injury. Difficult for me to assess in triage.       Phy:   Constitutional: well nourished, well developed, appearing stated age, NAD   HEENT: NCAT. Swelling to right upper lid and redness noted to conjunctiva   Respiratory: Normal effort.  No obvious use of accessory muscles   Musculoskeletal: Moved upper extremities well        Initial orders will be placed and care will be transferred to an alternate provider when patient is roomed for a full evaluation. Any additional orders and the final disposition will be determined by that provider.        ORDERS  Labs Reviewed - No data to display    ED Orders (720h ago, onward)      Start Ordered     Status Ordering Provider    01/03/23 1245 01/03/23 1240  fluorescein ophthalmic strip 1 each  ED 1 Time         Ordered ROSENDO HI    01/03/23 1245 01/03/23 1240  proparacaine 0.5 % ophthalmic solution 1 drop  ED 1 Time         Ordered ROSENDO HI    01/03/23 1241 01/03/23 1240  Visual acuity screening  Once         Ordered ROSENDO HI              Virtual Visit Note: The provider triage portion of this emergency department evaluation and documentation was performed via [employer]ide, a HIPAA-compliant telemedicine application, in concert with a tele-presenter in the room. A face to face patient  evaluation with one of my colleagues will occur once the patient is placed in an emergency department room.      DISCLAIMER: This note was prepared with [employer] voice recognition transcription software. Garbled syntax, mangled pronouns, and other bizarre constructions may be attributed to that software system.

## 2023-01-03 NOTE — ED NOTES
Pt lives in a group home for [employer]. Has chronic indwelling Ayers. Pt presents with right eye redness with drainage. Opacity to right eye noted. Upper and lower eyelid edema present

## 2023-01-03 NOTE — DISCHARGE INSTRUCTIONS
Use the Timolol 2 times a day  Brimonidine 2 times a day.    Next dose of eye drops when patient wakes up in the morning tomorrow.     Go to the 10th floor eye clinic TOMORROW MORNING AT 8 am sharp!!! Patient will be evaluated by Ophthalmologist.     Return to the emergency room with worsening or concerning symptoms.

## 2023-01-03 NOTE — ED PROVIDER NOTES
Encounter Date: 1/3/2023       History     Chief Complaint   Patient presents with    Eye Pain     Patient presented to ER with c/o right eye redness, pain, and swelling x 3 days. Patient sent to ER from urgent care.     52-year-old female with history of neurogenic bladder, Ayers in place, CKD, thyroid disease, mentally delayed and primarily nonverbal due to Down syndrome presents ER for evaluation with caregiver for right eye pain and swelling.  Caregiver states that symptoms have been ongoing for last 3 days.  They have noticed significant swelling to the eye and the eyelid.  Has not any fever at home.  Was seen at the urgent care who told her to come to the emergency room immediately for further evaluation.  No known injury or trauma.  Caregiver states that patient has been rubbing on the eye.  Does not use blood thinners.  No recent antibiotics to the eye.    The history is provided by a caregiver.   Review of patient's allergies indicates:   Allergen Reactions    Caffeine      Past Medical History:   Diagnosis Date    Down syndrome     Mixed hyperlipidemia     MVP (mitral valve prolapse)     Neurogenic bladder     Osteopenia     Renal disorder     Thyroid disease      Past Surgical History:   Procedure Laterality Date    HERNIA REPAIR       No family history on file.  Social History     Tobacco Use    Smoking status: Never    Smokeless tobacco: Never   Substance Use Topics    Alcohol use: No    Drug use: No     Review of Systems   Unable to perform ROS: Patient nonverbal     Physical Exam     Initial Vitals [01/03/23 1227]   BP Pulse Resp Temp SpO2   (!) 130/90 87 17 97.6 °F (36.4 °C) 99 %      MAP       --         Physical Exam    Vitals reviewed.  Constitutional: She appears well-developed and well-nourished. She is not diaphoretic. No distress.   HENT:   Head: Normocephalic and atraumatic.   Eyes: EOM are normal. Right eye exhibits chemosis (significant) and discharge (Clear drainage). Right conjunctiva is  injected (Hazy cornea, right, with opacification with erythema to the right conjunctiva). Left conjunctiva is not injected. Left conjunctiva has no hemorrhage. Left pupil is round and reactive.     No Fluorecin uptake on woods lamp examination  Extra occular movement intact.    Neck: Neck supple.   Cardiovascular:  Intact distal pulses.           Pulmonary/Chest: No respiratory distress.   Musculoskeletal:         General: Normal range of motion.      Cervical back: Neck supple.     Neurological: She is alert and oriented to person, place, and time.       ED Course   Procedures  Labs Reviewed   CBC W/ AUTO DIFFERENTIAL - Abnormal; Notable for the following components:       Result Value    MCH 32.8 (*)     Immature Granulocytes 0.6 (*)     Immature Grans (Abs) 0.06 (*)     All other components within normal limits   BASIC METABOLIC PANEL - Abnormal; Notable for the following components:    Chloride 111 (*)     CO2 19 (*)     BUN 22 (*)     Creatinine 1.7 (*)     eGFR 36 (*)     All other components within normal limits          Imaging Results    None          Medications   fluorescein ophthalmic strip 1 each (1 each Left Eye Given by Provider 1/3/23 1559)   proparacaine 0.5 % ophthalmic solution 1 drop (1 drop Left Eye Given by Provider 1/3/23 1552)   timolol maleate 0.5% ophthalmic solution 1 drop (1 drop Right Eye Given 1/3/23 1739)   acetaZOLAMIDE tablet 500 mg (500 mg Oral Given 1/3/23 1739)   brimonidine 0.15 % OPTH DROP ophthalmic solution 1 drop (1 drop Right Eye Given 1/3/23 1739)           APC / Resident Notes:   Patient in the ER promptly upon arrival.  She is afebrile.      ED Course as of 01/03/23 2253   Tue Jan 03, 2023   1630 Findings concerning for endophthalmitis  vs orbital cellulitis, acute glaucoma . Limited exam - significant chemosis, erythema to the right conjunctiva.  Clear drainage, no yellow-green discharge otherwise [AJ]   1716 IOP 50 [AJ]   1716 Ordered timolol, acetazolamide ,  brimonidine     Lab work consistent with baseline.  Creatinine 1.7, BUN 22.  [AJ]   1716 Discussed with transfer center, pending call back from ophthalmology [AJ]   1734 Spoke with Dr Murray, ophthalmology on-call.  Recommended ED to ED transfer to [facility] [AJ]   1759 Caregiver was informed of the decision for transfer to [facility] ED for ophthalmology evaluation.    Will continue optic eye drops Q 30 minutes [AJ]   1800 Pending ambulance arrival for transfer to [facility] ED [AJ]   1801 Informed by transfer center that [facility] ED at full capacity, will not accept the transfer as they are currently on diversion.  [facility] also on complete diversion at this time. [AJ]   1908 Discussed the situation with Dr. Martinez.  Given the ability to transfer patient.  Recommended to lower the IOP to the 30s.  Continue ophthalmic solutions.  Recommended to send patient up to the eye clinic on the 10th for tomorrow morning at 8:00 a.m..  Will require ophthalmology evaluation [AJ]   2009 Repeat IOP 40 on right eye - will reassess   [AJ]   2144 Repeat IOP 37. [AJ]   2144 Will give caregiver strict return precautions.  Will also give information for follow-up tomorrow morning at 8:00 a.m. to the eye clinic.    Will send home with timolol and brimonidine to use b.i.d..  Will write a prescription for acetazolamide b.i.d. [AJ]   2269 The care of this patient was overseen by attending physician who agrees with treatment, plan, and disposition.    Disclaimer: This note has been generated using voice-recognition software. There may be typographical errors that have been missed during proof-reading.     [AJ]      ED Course User Index  [AJ] Marisabel Chavis PA-C                 Clinical Impression:   Final diagnoses:  [H40.211] Acute angle-closure glaucoma of right eye (Primary)        ED Disposition Condition    Discharge Stable          ED Prescriptions       Medication Sig Dispense Start Date End Date Auth. Provider    acetaZOLAMIDE  (DIAMOX) 250 MG tablet Take 1 tablet (250 mg total) by mouth 2 (two) times daily. for 3 days 6 tablet 1/3/2023 1/6/2023 Marisabel Chavis PA-C          Follow-up Information       Follow up With Specialties Details Why Contact Info Additional Information    [facility] - [address] [place] [address] Ophthalmology   [address]  [facility] [address] [phone] Please arrive on the 10th floor for check-in.             Marisabel Chavis PA-C  01/03/23 1802       Marisabel Chavis PA-C  01/03/23 2251       Marisabel Chavis PA-C  01/03/23 2251

## 2023-01-04 ENCOUNTER — OFFICE VISIT (OUTPATIENT)
Dept: OPHTHALMOLOGY | Facility: CLINIC | Age: 53
End: 2023-01-04
Payer: MEDICAID

## 2023-01-04 DIAGNOSIS — N18.30 STAGE 3 CHRONIC KIDNEY DISEASE, UNSPECIFIED WHETHER STAGE 3A OR 3B CKD: ICD-10-CM

## 2023-01-04 DIAGNOSIS — H40.211 ACUTE ANGLE-CLOSURE GLAUCOMA OF RIGHT EYE: Primary | ICD-10-CM

## 2023-01-04 DIAGNOSIS — Q90.9 DOWN SYNDROME: Primary | ICD-10-CM

## 2023-01-04 DIAGNOSIS — I42.9 CARDIOMYOPATHY, UNSPECIFIED TYPE: ICD-10-CM

## 2023-01-04 DIAGNOSIS — H40.211 ACUTE ANGLE-CLOSURE GLAUCOMA OF RIGHT EYE: ICD-10-CM

## 2023-01-04 DIAGNOSIS — H40.51X0: ICD-10-CM

## 2023-01-04 DIAGNOSIS — H25.21: ICD-10-CM

## 2023-01-04 PROCEDURE — 99204 OFFICE O/P NEW MOD 45 MIN: CPT | Mod: S$PBB,,, | Performed by: STUDENT IN AN ORGANIZED HEALTH CARE EDUCATION/TRAINING PROGRAM

## 2023-01-04 PROCEDURE — 1160F RVW MEDS BY RX/DR IN RCRD: CPT | Mod: CPTII,,, | Performed by: STUDENT IN AN ORGANIZED HEALTH CARE EDUCATION/TRAINING PROGRAM

## 2023-01-04 PROCEDURE — 1159F PR MEDICATION LIST DOCUMENTED IN MEDICAL RECORD: ICD-10-PCS | Mod: CPTII,,, | Performed by: STUDENT IN AN ORGANIZED HEALTH CARE EDUCATION/TRAINING PROGRAM

## 2023-01-04 PROCEDURE — 1159F MED LIST DOCD IN RCRD: CPT | Mod: CPTII,,, | Performed by: STUDENT IN AN ORGANIZED HEALTH CARE EDUCATION/TRAINING PROGRAM

## 2023-01-04 PROCEDURE — 99204 PR OFFICE/OUTPT VISIT, NEW, LEVL IV, 45-59 MIN: ICD-10-PCS | Mod: S$PBB,,, | Performed by: STUDENT IN AN ORGANIZED HEALTH CARE EDUCATION/TRAINING PROGRAM

## 2023-01-04 PROCEDURE — 99212 OFFICE O/P EST SF 10 MIN: CPT | Mod: PBBFAC | Performed by: STUDENT IN AN ORGANIZED HEALTH CARE EDUCATION/TRAINING PROGRAM

## 2023-01-04 PROCEDURE — 99999 PR PBB SHADOW E&M-EST. PATIENT-LVL II: ICD-10-PCS | Mod: PBBFAC,,, | Performed by: STUDENT IN AN ORGANIZED HEALTH CARE EDUCATION/TRAINING PROGRAM

## 2023-01-04 PROCEDURE — 1160F PR REVIEW ALL MEDS BY PRESCRIBER/CLIN PHARMACIST DOCUMENTED: ICD-10-PCS | Mod: CPTII,,, | Performed by: STUDENT IN AN ORGANIZED HEALTH CARE EDUCATION/TRAINING PROGRAM

## 2023-01-04 PROCEDURE — 99999 PR PBB SHADOW E&M-EST. PATIENT-LVL II: CPT | Mod: PBBFAC,,, | Performed by: STUDENT IN AN ORGANIZED HEALTH CARE EDUCATION/TRAINING PROGRAM

## 2023-01-04 RX ORDER — ACETAZOLAMIDE 250 MG/1
250 TABLET ORAL 2 TIMES DAILY
Qty: 60 TABLET | Refills: 3 | Status: SHIPPED | OUTPATIENT
Start: 2023-01-04

## 2023-01-04 RX ORDER — LATANOPROST 50 UG/ML
1 SOLUTION/ DROPS OPHTHALMIC NIGHTLY
Qty: 2.5 ML | Refills: 6 | Status: SHIPPED | OUTPATIENT
Start: 2023-01-04

## 2023-01-04 RX ORDER — PREDNISOLONE ACETATE 10 MG/ML
1 SUSPENSION/ DROPS OPHTHALMIC 4 TIMES DAILY
Qty: 10 ML | Refills: 6 | Status: SHIPPED | OUTPATIENT
Start: 2023-01-04

## 2023-01-04 RX ORDER — DORZOLAMIDE HYDROCHLORIDE AND TIMOLOL MALEATE 20; 5 MG/ML; MG/ML
1 SOLUTION/ DROPS OPHTHALMIC 2 TIMES DAILY
Qty: 10 ML | Refills: 6 | Status: SHIPPED | OUTPATIENT
Start: 2023-01-04

## 2023-01-04 RX ORDER — BRIMONIDINE TARTRATE 2 MG/ML
1 SOLUTION/ DROPS OPHTHALMIC 2 TIMES DAILY
COMMUNITY
End: 2023-01-04 | Stop reason: SDUPTHER

## 2023-01-04 RX ORDER — LATANOPROST 50 UG/ML
1 SOLUTION/ DROPS OPHTHALMIC NIGHTLY
COMMUNITY
End: 2023-01-04 | Stop reason: SDUPTHER

## 2023-01-04 RX ORDER — DORZOLAMIDE HYDROCHLORIDE AND TIMOLOL MALEATE 20; 5 MG/ML; MG/ML
1 SOLUTION/ DROPS OPHTHALMIC 2 TIMES DAILY
COMMUNITY
End: 2023-01-04 | Stop reason: SDUPTHER

## 2023-01-04 RX ORDER — PREDNISOLONE ACETATE 10 MG/ML
1 SUSPENSION/ DROPS OPHTHALMIC 4 TIMES DAILY
COMMUNITY
End: 2023-01-04 | Stop reason: SDUPTHER

## 2023-01-04 RX ORDER — BRIMONIDINE TARTRATE 2 MG/ML
1 SOLUTION/ DROPS OPHTHALMIC 2 TIMES DAILY
Qty: 10 ML | Refills: 6 | Status: SHIPPED | OUTPATIENT
Start: 2023-01-04

## 2023-01-04 NOTE — PROGRESS NOTES
HPI    52 year old mentally challenged Down Syndrome pt arrived today with her   caretaker Denzel Daly  Pt was seen in the ED last night at [facility]   Pt found to have elevated IOP   Pt is followed at [facility] for her eye care  Pt started on Timolol and Brimonidine and sent to eye clinic for care   Last edited by Carol Ny MA on 1/4/2023 12:50 PM.        Assessment /Plan     For exam results, see Encounter Report.    Down syndrome    Acute angle-closure glaucoma of right eye  -     Ambulatory referral/consult to Ophthalmology    Cardiomyopathy, unspecified type    Stage 3 chronic kidney disease, unspecified whether stage 3a or 3b CKD    Morgagnian cataract of right eye    Phacolytic glaucoma of right eye      Assessment:  Phacolytic glaucoma with history of Morgagnian cataract, right eye  - Synopsis: 51 yo F with PMH Down's Syndrome, CKD/renal disorder, cardiomyopathy/cardiac defect here for urgent eval. Presented to [facility] yesterday with right eye injection, pain, and elevated IOP per ER doctors. Given acetazolamide 500 mg tablet x1, timolol and brimonidine drops. Then presented to my clinic this morning. Patient is non-verbal.  - Today: Caregiver Denzel Daly reports patient appears much more comfortable. Yesterday she was in significant pain. Used timolol and brimonidine drops only this morning.    1/4/23:  - Patient is unable to cooperate with examination. With the aid of 2 assistants, I could get a brief look at the slit lamp. Right eye: chemosis, discharge, diffuse injection, AC very shallow but with refractile opacities as well, lens appears white. Left eye: grossly wnl.  - IOP (iCare): 17, 21, 26, 25, 18 OD. Tpalp soft OS.  - B scan with retina flat OD    [facility] notes reviewed:  8/26/22, 9/9/22: Visits with Sidney Russell/Gem and Sidney Mcclendon/Gem respectively. Planning bilateral cataract extraction under general anesthesia for morgagnian cataract OD and combined cataract OS. Poorly cooperative  with exam/pre op measurements so plan in place for intraop IOL calculations and appropriate secondary personnel (in case of PPV need for OD).    12/23/22: Dr. Borrego followed up with assistant nursing manager regarding PCP and cardiology clearance prior to planned cataract surgery. Asked that notes be faxed to [facility] clinic fax.    Assessment/Plan:  IOP is much improved (Tpalp soft, iCare 20s) OD. Patient is more comfortable and not in pain as compared to yesterday per caregiver. Exam is very difficult 2/2 poor cooperation but given improved IOP, patient is not in need of emergency surgery at this time. However, brief slit lamp exam (given patient cooperation) is suspicious for phacolytic glaucoma (refractile opacities in AC) and phacomorphic glaucoma (narrow AC) 2/2 known hypermature Morgagnian cataract. This necessitates surgery sooner rather than later. Long discussion with patient's caregivers (Denzel Daly [phone] and Carmen Mcdonald [phone] at group home) about assessment/plan. Patient requires cataract surgery under general anesthesia given poor cooperation with exam. Because her medical history is complex (Down's, hx cardiomyopathy, CKD with indwelling tobar/renal disorder), she requires PCP clearance, cardiology clearance, and nephrology clearance. This has been started by previous doctors at [facility].   - Start PF QID OD  - Change Timolol to Cosopt BID OD  - Continue Brimonidine BID OD  - Start Xalatan QHS OD  - Start Acetazolamide 250 mg PO BID  - Follow up with [facility] Ophthalmology this week or latest early next week. Carmen aware to move up cardiac clearance appointment date. Our staff to help coordinate care.     RTC me PRN

## 2023-01-05 NOTE — TELEPHONE ENCOUNTER
Reached out to multiple people affiliated with [facility] clinics.   Carlie Harmon returned my call and was given all information to get this pt booked for immediate follow up at [facility] ophthalmology clinic per Dr. Gifford. Carlie was given the name and phone numbers of caretakers.   Carmen Mcdonald [phone]  Denzel Daly [phone]

## 2023-12-26 ENCOUNTER — HOSPITAL ENCOUNTER (EMERGENCY)
Facility: OTHER | Age: 53
Discharge: HOME | End: 2023-12-26
Attending: EMERGENCY MEDICINE
Payer: MEDICAID

## 2023-12-26 VITALS
SYSTOLIC BLOOD PRESSURE: 102 MMHG | OXYGEN SATURATION: 99 % | WEIGHT: 80.69 LBS | HEART RATE: 57 BPM | TEMPERATURE: 98 F | RESPIRATION RATE: 18 BRPM | BODY MASS INDEX: 18.67 KG/M2 | HEIGHT: 55 IN | DIASTOLIC BLOOD PRESSURE: 60 MMHG

## 2023-12-26 DIAGNOSIS — F79 INTELLECTUAL DISABILITY: ICD-10-CM

## 2023-12-26 DIAGNOSIS — S01.01XA LACERATION OF SCALP, INITIAL ENCOUNTER: Primary | ICD-10-CM
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
## 2023-12-26 LAB — POCT GLUCOSE: 113 MG/DL (ref 70–110)

## 2023-12-26 PROCEDURE — 12002 RPR S/N/AX/GEN/TRNK2.6-7.5CM: CPT

## 2023-12-26 PROCEDURE — 25000003 PHARM REV CODE 250: Performed by: INTERNAL MEDICINE

## 2023-12-26 PROCEDURE — 99284 EMERGENCY DEPT VISIT MOD MDM: CPT | Mod: 25

## 2023-12-26 PROCEDURE — 82962 GLUCOSE BLOOD TEST: CPT

## 2023-12-26 RX ORDER — ACETAMINOPHEN 500 MG
1000 TABLET ORAL
Status: COMPLETED | OUTPATIENT
Start: 2023-12-26 | End: 2023-12-26

## 2023-12-26 RX ADMIN — ACETAMINOPHEN 1000 MG: 500 TABLET ORAL at 08:12

## 2023-12-26 RX ADMIN — BACITRACIN ZINC, NEOMYCIN, POLYMYXIN B 1 EACH: 400; 3.5; 5 OINTMENT TOPICAL at 08:12

## 2023-12-27 NOTE — ED PROVIDER NOTES
Encounter date: 8:17 PM 12/26/2023    Source of History   Patient/caregiver  Hx limited by and intellectual disability  Chief Complaint   Pt presents with:   Head Laceration (Care giver endorses pt taking a fall approx. 5 min to 1800. Denies LOC. Laceration to posterior scalp.)      History Of Present Illness   Shannan Medrano is a 53 y.o. female with history of down syndrome, type 2 diabetes, hyperlipidemia, history of DVT on Eliquis who presents to the ED with mechanical trip and fall 6:00 p.m..  Patient was trying to throw away her napkin after dinner.  She tripped over her Ayers bag and she fell backwards and hit her head on the wall.  No LOC. She was assisted back up.  She has been able to ambulate.  She has been using her arms and legs without difficulty.  She denies chest pain or shortness of breath, neck or back pain.  Her last dose of Eliquis was at 7:00 a.m. and she took 5mg. She denies any urinary complaints.     This is the extent to the patients complaints today here in the emergency department.  Past History     Review of patient's allergies indicates:   Allergen Reactions    Caffeine        No current facility-administered medications on file prior to encounter.     Current Outpatient Medications on File Prior to Encounter   Medication Sig Dispense Refill    acetaZOLAMIDE (DIAMOX) 250 MG tablet Take 1 tablet (250 mg total) by mouth 2 (two) times daily. 60 tablet 3    alendronate (FOSAMAX) 5 MG Tab Take by mouth before breakfast.      apixaban (ELIQUIS) 5 mg Tab Take 5 mg by mouth 2 (two) times daily.      ascorbic acid, vitamin C, (VITAMIN C) 500 MG tablet Take 500 mg by mouth once daily.      atorvastatin (LIPITOR) 10 MG tablet Take 10 mg by mouth once daily.      azelastine (ASTELIN) 137 mcg (0.1 %) nasal spray 1 spray by Nasal route 2 (two) times daily.      brimonidine 0.2% (ALPHAGAN) 0.2 % Drop Place 1 drop into the right eye 2 (two) times a day. 10 mL 6    calcium-vitamin D3 500 mg(1,250mg) -200  unit per tablet Take 1 tablet by mouth 2 (two) times daily with meals.      dicyclomine (BENTYL) 10 MG capsule Take 1 capsule (10 mg total) by mouth before meals and at bedtime as needed (abdominal pain or cramps). 30 capsule 0    dorzolamide-timolol 2-0.5% (COSOPT) 22.3-6.8 mg/mL ophthalmic solution Place 1 drop into the right eye 2 (two) times daily. 10 mL 6    erythromycin (ROMYCIN) ophthalmic ointment Place a 1/2 inch ribbon of ointment into the lower eyelid. 3.5 g 0    folic acid (FOLVITE) 400 MCG tablet Take 400 mcg by mouth once daily.      guaifenesin 100 mg/5 ml (ROBITUSSIN) 100 mg/5 mL syrup Take 200 mg by mouth 3 (three) times daily as needed for Cough.      hydrocortisone 2.5 % cream Apply topically 2 (two) times daily.      latanoprost 0.005 % ophthalmic solution Place 1 drop into the right eye every evening. 2.5 mL 6    levothyroxine (SYNTHROID) 50 MCG tablet Take 50 mcg by mouth before breakfast.      medroxyPROGESTERone (DEPO-PROVERA) 150 mg/mL injection Inject 1 mL (150 mg total) into the muscle every 3 (three) months. 1 mL 3    metFORMIN (GLUMETZA) 500 MG (MOD) 24 hr tablet Take 500 mg by mouth daily with breakfast.      multivitamin (THERAGRAN) per tablet Take 1 tablet by mouth once daily.      oxybutynin (DITROPAN-XL) 10 MG 24 hr tablet Take 10 mg by mouth once daily.      prednisoLONE acetate (PRED FORTE) 1 % DrpS Place 1 drop into the right eye 4 (four) times daily. 10 mL 6    simethicone (MYLICON) 80 MG chewable tablet Take 80 mg by mouth every 6 (six) hours as needed for Flatulence.      white petrolatum-mineral oil (EUCERIN) Crea Apply topically as needed.         As per HPI and below:  Past Medical History:   Diagnosis Date    Down syndrome     Mixed hyperlipidemia     MVP (mitral valve prolapse)     Neurogenic bladder     Osteopenia     Renal disorder     Thyroid disease      Past Surgical History:   Procedure Laterality Date    HERNIA REPAIR         Social History     Socioeconomic  "History    Marital status: Single   Tobacco Use    Smoking status: Never    Smokeless tobacco: Never   Substance and Sexual Activity    Alcohol use: No    Drug use: No    Sexual activity: Never       No family history on file.    Physical Exam     Vitals:    12/26/23 1933   BP: (!) 103/59   Pulse: 78   Resp: 19   Temp: 98.2 °F (36.8 °C)   TempSrc: Oral   SpO2: 99%   Weight: 36.6 kg (80 lb 11 oz)   Height: 4' 6" (1.372 m)     Physical Exam:   Nursing note and vitals reviewed.  Appearance:  Well-appearing, nontoxic adult female in no acute respiratory distress.  Making purposeful movements.    Skin: No obvious rashes seen.  Good turgor.  Linear vertical 3 cm laceration on the posterior aspect of the scalp.  Eyes: No conjunctival injection. EOMI, PERRL.  Head: NC/AT  ENT:  No septal hematoma.  Chest: CTAB. No increased work of breathing, bilateral chest rise.  Cardiovascular: Regular rate and rhythm.  Normal equal bilateral radial pulses.  Abdomen: Soft.  Not distended.  Nontender.  No guarding.  No rebound. No Masses  Musculoskeletal: No obvious deformities.  Neck supple, full range of motion, no obvious deformity.   No tenderness to palpation of cervical through lumbar spine.  No step-offs or deformities. Good range of motion all joints.  Neurologic: Moves all extremities.  Normal sensation.  No facial droop.  Speech at baseline   Mental Status:  At baseline per caregiver    Results and Medications    Lac Repair    Date/Time: 12/26/2023 8:44 PM    Performed by: Henry Chatman MD  Authorized by: Henry Chatman MD    Consent:     Consent obtained:  Verbal    Consent given by:  Healthcare agent and guardian    Risks, benefits, and alternatives were discussed: yes      Risks discussed:  Infection and pain    Alternatives discussed:  No treatment  Universal protocol:     Patient identity confirmed:  Verbally with patient and arm band  Laceration details:     Location:  Scalp    Scalp location:  Occipital    Length " (cm):  3    Depth (mm):  5  Pre-procedure details:     Preparation:  Patient was prepped and draped in usual sterile fashion and imaging obtained to evaluate for foreign bodies  Exploration:     Hemostasis achieved with:  Direct pressure    Imaging outcome: foreign body not noted      Wound exploration: entire depth of wound visualized      Contaminated: no    Treatment:     Amount of cleaning:  Extensive    Irrigation volume:  1    Visualized foreign bodies/material removed: no    Skin repair:     Repair method:  Staples    Number of staples:  3  Approximation:     Approximation:  Close  Repair type:     Repair type:  Simple  Post-procedure details:     Dressing:  Antibiotic ointment and non-adherent dressing    Procedure completion:  Tolerated      Labs Reviewed   POCT GLUCOSE - Abnormal; Notable for the following components:       Result Value    POCT Glucose 113 (*)     All other components within normal limits   POCT GLUCOSE MONITORING CONTINUOUS       Imaging Results              CT Cervical Spine Without Contrast (Final result)  Result time 12/26/23 21:04:14      Final result by Martin Sherman MD (12/26/23 21:04:14)                   Impression:      No evidence of acute cervical spine fracture or dislocation.      Electronically signed by: Martin Sherman MD  Date:    12/26/2023  Time:    21:04               Narrative:    EXAMINATION:  CT CERVICAL SPINE WITHOUT CONTRAST    CLINICAL HISTORY:  Neck trauma, intoxicated or obtunded (Age >= 16y);    TECHNIQUE:  Low dose axial images, sagittal and coronal reformations were performed though the cervical spine.  Contrast was not administered.    COMPARISON:  None    FINDINGS:  No evidence of acute cervical spine fracture or dislocation.  Odontoid process is intact.  Craniocervical junction is unremarkable.  Cervical spine alignment is within normal limits.  Intervertebral disc space narrowing and degenerative endplate changes are seen from the C2-3 through C6-7  levels.  Facet fusion is visualized on the left at the C4-5 level.    Surrounding soft tissues show no significant abnormalities.  Airway is patent.  Partially visualized lung apices are clear.                                       CT Head Without Contrast (Final result)  Result time 12/26/23 21:01:44      Final result by Martin Sherman MD (12/26/23 21:01:44)                   Impression:      No acute intracranial abnormalities identified.  Diffuse prominence of the ventricular system, similar to prior CT from 2019.      Electronically signed by: Martin Sherman MD  Date:    12/26/2023  Time:    21:01               Narrative:    EXAMINATION:  CT HEAD WITHOUT CONTRAST    CLINICAL HISTORY:  Head trauma, moderate-severe;    TECHNIQUE:  Low dose axial images were obtained through the head.  Coronal and sagittal reformations were also performed. Contrast was not administered.    COMPARISON:  CT head from January 2019.    FINDINGS:  No evidence of acute/recent major vascular distribution cerebral infarction, intraparenchymal hemorrhage, or intra-axial space occupying lesion.  Dense bilateral basal ganglia calcifications are seen.  There is similar diffuse prominence of the ventricular system.  No effacement of the skull-base cisterns. No abnormal extra-axial fluid collections or blood products. Visualized paranasal sinuses and mastoid air cells are clear. The calvarium shows no significant abnormality.                                          Medications   acetaminophen tablet 1,000 mg (1,000 mg Oral Given 12/26/23 2059)   neomycin-bacitracnZn-polymyxnB packet 1 each (1 each Topical (Top) Given 12/26/23 2058)       MDM, Impression and Plan   Previous Records:   I decided to obtain old medical records which is listed here or in ED course:  On nursing home chart review, she took her last dose of Eliquis at 7:00 a.m. the morning of arrival in the ED.     Clinical Tests:   Lab Tests: Ordered and Reviewed  Radiological  Study: Ordered and Reviewed  Medical Tests: Ordered and Reviewed    Differential diagnosis:  -mechanical fall   -unlikely syncope based off complaints and mechanisms  -ICH  -cervical fracture versus dislocation   -hypoglycemia   -unlikely extremity fracture versus dislocation based off complaints and physical exam    Initial Assessment & ED Management:    Urgent evaluation of 53 y.o. female with history of down syndrome, on anticoagulation who had a mechanical trip and fall over her Ayers bag.  She fell backwards and hit her head on the wall around 6:00 p.m..  Per caregiving staff inpatient she sustained no falls abrasions lacerations to the rest of her body besides the laceration in the posterior aspect of her head.  The bleeding has been controlled.  Irrigated by nursing staff.  Will obtain CT head and cervical spine.  Glucose within normal limits.  Will treat with Tylenol for analgesia.  Will repair laceration after imaging.    On chart review patient has Tdap was updated on 04/04/2019.  Pain controlled with Tylenol.  CT head and cervical spine with no gross abnormality.  No ICH.  No cervical fracture dislocation.  See procedure note for laceration repair.  Three staples were placed.  Patient is moving all extremities without difficulty.  She is at her baseline.  Gait stable.  Will reassess vitals.  Her vitals were stable will plan for discharge.  Supportive care and wound care addressed with patient and caregiver at bedside.  Care plan addressed with patient and all those present. All questions answered.  Strict return precautions discussed.  Patient was instructed on the correct follow-up time and route.  They voiced verbal understanding and agreement  with the plan and were deemed stable for discharge.   Medical Decision Making  Amount and/or Complexity of Data Reviewed  Radiology: ordered and independent interpretation performed.     Details: Independently Interpreted Test(s):   IMAGING:  I have ordered and  independently interpreted X-rays and my findings are as follow:  Detailed here or in ED course.  CT head with no intracranial bleed.    Risk  OTC drugs.           Please see ED course for discussion of workup and dispo if not listed above.          Final diagnoses:  [S01.01XA] Laceration of scalp, initial encounter (Primary)  [W19.XXXA] Fall, initial encounter  [F79] Intellectual disability                            MD Compa Ruiz Heyward B, MD  12/26/23 1318

## 2023-12-27 NOTE — ED TRIAGE NOTES
Fell at about 6 pm and hit back of head on a post sustaining a scalp laceration. No LOC. Presents awake, alert, at at baseline per caregiver. No distress noted.

## 2024-07-27 ENCOUNTER — HOSPITAL ENCOUNTER (EMERGENCY)
Facility: OTHER | Age: 54
Discharge: HOME | End: 2024-07-27
Attending: EMERGENCY MEDICINE
Payer: MEDICAID

## 2024-07-27 VITALS
OXYGEN SATURATION: 100 % | RESPIRATION RATE: 18 BRPM | SYSTOLIC BLOOD PRESSURE: 106 MMHG | WEIGHT: 81.56 LBS | TEMPERATURE: 98 F | HEIGHT: 55 IN | HEART RATE: 70 BPM | BODY MASS INDEX: 18.87 KG/M2 | DIASTOLIC BLOOD PRESSURE: 58 MMHG

## 2024-07-27 DIAGNOSIS — S00.83XA FOREHEAD CONTUSION, INITIAL ENCOUNTER: ICD-10-CM
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
## 2024-07-27 PROCEDURE — 99284 EMERGENCY DEPT VISIT MOD MDM: CPT | Mod: 25

## 2024-10-09 NOTE — ASSESSMENT & PLAN NOTE
Home Meds:  Metformin 500mg  A1C 5.1 on admission  Glucose well controlled  - Diabetic diet         4/week(s)

## 2025-07-11 NOTE — ED NOTES
Patient moved to ED room 1 via triage nurse and caregiver via wheelchair, patient assisted onto stretcher and changed into a gown. Patient placed on cardiac monitor, continuous pulse oximetry and automatic blood pressure cuff. Bed placed in low locked position, side rails up x 2, call light is within reach of patient or family, orientation to room and explanation of wait provided to family and patient, alarms set and turned on for monitor and pulse ox, awaiting MD evaluation and orders, will continue to monitor.   Reviewed by Dr. Doshi, 